# Patient Record
Sex: FEMALE | Race: WHITE | NOT HISPANIC OR LATINO | Employment: UNEMPLOYED | URBAN - METROPOLITAN AREA
[De-identification: names, ages, dates, MRNs, and addresses within clinical notes are randomized per-mention and may not be internally consistent; named-entity substitution may affect disease eponyms.]

---

## 2017-07-20 ENCOUNTER — ALLSCRIPTS OFFICE VISIT (OUTPATIENT)
Dept: OTHER | Facility: OTHER | Age: 57
End: 2017-07-20

## 2017-07-20 DIAGNOSIS — M54.50 LOW BACK PAIN: ICD-10-CM

## 2017-07-31 ENCOUNTER — APPOINTMENT (OUTPATIENT)
Dept: PHYSICAL THERAPY | Facility: CLINIC | Age: 57
End: 2017-07-31
Payer: COMMERCIAL

## 2017-07-31 DIAGNOSIS — M54.50 LOW BACK PAIN: ICD-10-CM

## 2017-07-31 PROCEDURE — 97162 PT EVAL MOD COMPLEX 30 MIN: CPT

## 2017-07-31 PROCEDURE — 97112 NEUROMUSCULAR REEDUCATION: CPT

## 2017-08-01 ENCOUNTER — GENERIC CONVERSION - ENCOUNTER (OUTPATIENT)
Dept: OTHER | Facility: OTHER | Age: 57
End: 2017-08-01

## 2017-08-03 ENCOUNTER — APPOINTMENT (OUTPATIENT)
Dept: PHYSICAL THERAPY | Facility: CLINIC | Age: 57
End: 2017-08-03
Payer: COMMERCIAL

## 2017-08-03 PROCEDURE — 97110 THERAPEUTIC EXERCISES: CPT

## 2017-08-03 PROCEDURE — 97112 NEUROMUSCULAR REEDUCATION: CPT

## 2017-08-07 ENCOUNTER — APPOINTMENT (OUTPATIENT)
Dept: PHYSICAL THERAPY | Facility: CLINIC | Age: 57
End: 2017-08-07
Payer: COMMERCIAL

## 2017-08-07 PROCEDURE — 97110 THERAPEUTIC EXERCISES: CPT

## 2017-08-07 PROCEDURE — 97112 NEUROMUSCULAR REEDUCATION: CPT

## 2017-08-10 ENCOUNTER — APPOINTMENT (OUTPATIENT)
Dept: PHYSICAL THERAPY | Facility: CLINIC | Age: 57
End: 2017-08-10
Payer: COMMERCIAL

## 2017-08-10 PROCEDURE — 97112 NEUROMUSCULAR REEDUCATION: CPT

## 2017-08-10 PROCEDURE — 97110 THERAPEUTIC EXERCISES: CPT

## 2017-08-14 ENCOUNTER — APPOINTMENT (OUTPATIENT)
Dept: PHYSICAL THERAPY | Facility: CLINIC | Age: 57
End: 2017-08-14
Payer: COMMERCIAL

## 2017-08-16 ENCOUNTER — APPOINTMENT (OUTPATIENT)
Dept: PHYSICAL THERAPY | Facility: CLINIC | Age: 57
End: 2017-08-16
Payer: COMMERCIAL

## 2017-08-16 PROCEDURE — 97140 MANUAL THERAPY 1/> REGIONS: CPT

## 2017-08-16 PROCEDURE — 97112 NEUROMUSCULAR REEDUCATION: CPT

## 2017-08-16 PROCEDURE — 97110 THERAPEUTIC EXERCISES: CPT

## 2017-08-17 ENCOUNTER — APPOINTMENT (OUTPATIENT)
Dept: PHYSICAL THERAPY | Facility: CLINIC | Age: 57
End: 2017-08-17
Payer: COMMERCIAL

## 2017-08-17 PROCEDURE — 97112 NEUROMUSCULAR REEDUCATION: CPT

## 2017-08-17 PROCEDURE — 97140 MANUAL THERAPY 1/> REGIONS: CPT

## 2017-08-17 PROCEDURE — 97110 THERAPEUTIC EXERCISES: CPT

## 2017-08-21 ENCOUNTER — APPOINTMENT (OUTPATIENT)
Dept: PHYSICAL THERAPY | Facility: CLINIC | Age: 57
End: 2017-08-21
Payer: COMMERCIAL

## 2017-08-21 PROCEDURE — 97112 NEUROMUSCULAR REEDUCATION: CPT

## 2017-08-21 PROCEDURE — 97110 THERAPEUTIC EXERCISES: CPT

## 2017-08-24 ENCOUNTER — GENERIC CONVERSION - ENCOUNTER (OUTPATIENT)
Dept: OTHER | Facility: OTHER | Age: 57
End: 2017-08-24

## 2017-08-24 ENCOUNTER — APPOINTMENT (OUTPATIENT)
Dept: PHYSICAL THERAPY | Facility: CLINIC | Age: 57
End: 2017-08-24
Payer: COMMERCIAL

## 2017-08-24 PROCEDURE — 97112 NEUROMUSCULAR REEDUCATION: CPT

## 2017-08-24 PROCEDURE — 97110 THERAPEUTIC EXERCISES: CPT

## 2017-08-28 ENCOUNTER — APPOINTMENT (OUTPATIENT)
Dept: PHYSICAL THERAPY | Facility: CLINIC | Age: 57
End: 2017-08-28
Payer: COMMERCIAL

## 2017-08-28 PROCEDURE — 97112 NEUROMUSCULAR REEDUCATION: CPT

## 2017-08-28 PROCEDURE — 97110 THERAPEUTIC EXERCISES: CPT

## 2017-08-31 ENCOUNTER — APPOINTMENT (OUTPATIENT)
Dept: PHYSICAL THERAPY | Facility: CLINIC | Age: 57
End: 2017-08-31
Payer: COMMERCIAL

## 2017-08-31 PROCEDURE — 97112 NEUROMUSCULAR REEDUCATION: CPT

## 2017-08-31 PROCEDURE — 97110 THERAPEUTIC EXERCISES: CPT

## 2017-09-11 ENCOUNTER — APPOINTMENT (OUTPATIENT)
Dept: PHYSICAL THERAPY | Facility: CLINIC | Age: 57
End: 2017-09-11
Payer: COMMERCIAL

## 2017-09-11 PROCEDURE — 97110 THERAPEUTIC EXERCISES: CPT

## 2017-09-11 PROCEDURE — 97112 NEUROMUSCULAR REEDUCATION: CPT

## 2017-09-14 ENCOUNTER — APPOINTMENT (OUTPATIENT)
Dept: PHYSICAL THERAPY | Facility: CLINIC | Age: 57
End: 2017-09-14
Payer: COMMERCIAL

## 2017-09-14 PROCEDURE — 97112 NEUROMUSCULAR REEDUCATION: CPT

## 2017-09-14 PROCEDURE — 97110 THERAPEUTIC EXERCISES: CPT

## 2017-09-14 PROCEDURE — 97140 MANUAL THERAPY 1/> REGIONS: CPT

## 2017-09-18 ENCOUNTER — APPOINTMENT (OUTPATIENT)
Dept: PHYSICAL THERAPY | Facility: CLINIC | Age: 57
End: 2017-09-18
Payer: COMMERCIAL

## 2017-09-18 PROCEDURE — 97110 THERAPEUTIC EXERCISES: CPT

## 2017-09-18 PROCEDURE — 97112 NEUROMUSCULAR REEDUCATION: CPT

## 2017-09-21 ENCOUNTER — GENERIC CONVERSION - ENCOUNTER (OUTPATIENT)
Dept: OTHER | Facility: OTHER | Age: 57
End: 2017-09-21

## 2017-09-21 ENCOUNTER — APPOINTMENT (OUTPATIENT)
Dept: PHYSICAL THERAPY | Facility: CLINIC | Age: 57
End: 2017-09-21
Payer: COMMERCIAL

## 2017-09-21 PROCEDURE — G8992 OTHER PT/OT  D/C STATUS: HCPCS

## 2017-09-21 PROCEDURE — 97110 THERAPEUTIC EXERCISES: CPT

## 2017-09-21 PROCEDURE — 97112 NEUROMUSCULAR REEDUCATION: CPT

## 2017-09-21 PROCEDURE — G8991 OTHER PT/OT GOAL STATUS: HCPCS

## 2017-09-25 ENCOUNTER — APPOINTMENT (OUTPATIENT)
Dept: PHYSICAL THERAPY | Facility: CLINIC | Age: 57
End: 2017-09-25
Payer: COMMERCIAL

## 2017-09-27 ENCOUNTER — GENERIC CONVERSION - ENCOUNTER (OUTPATIENT)
Dept: OTHER | Facility: OTHER | Age: 57
End: 2017-09-27

## 2017-09-27 ENCOUNTER — APPOINTMENT (OUTPATIENT)
Dept: PHYSICAL THERAPY | Facility: CLINIC | Age: 57
End: 2017-09-27
Payer: COMMERCIAL

## 2017-09-28 ENCOUNTER — APPOINTMENT (OUTPATIENT)
Dept: PHYSICAL THERAPY | Facility: CLINIC | Age: 57
End: 2017-09-28
Payer: COMMERCIAL

## 2017-11-30 DIAGNOSIS — Z12.31 ENCOUNTER FOR SCREENING MAMMOGRAM FOR MALIGNANT NEOPLASM OF BREAST: ICD-10-CM

## 2017-12-05 ENCOUNTER — GENERIC CONVERSION - ENCOUNTER (OUTPATIENT)
Dept: FAMILY MEDICINE CLINIC | Facility: CLINIC | Age: 57
End: 2017-12-05

## 2017-12-15 ENCOUNTER — ALLSCRIPTS OFFICE VISIT (OUTPATIENT)
Dept: OTHER | Facility: OTHER | Age: 57
End: 2017-12-15

## 2017-12-16 NOTE — PROGRESS NOTES
Assessment  1  Anxiety and depression (300 00,311) (F41 8)   2  Need for immunization against influenza (V04 81) (Z23)    Plan  Anxiety and depression    · ALPRAZolam 0 25 MG Oral Tablet; take 1 tablet daily prn   · FLUoxetine HCl - 20 MG Oral Tablet; TAKE 1 TABLET DAILY  Need for immunization against influenza    · Fluzone Quadrivalent Intramuscular Suspension; in office now; To Be Done:[id number]    Discussion/Summary    She is interested in restarting Prozac  A prescription for Xanax was given for her to use sparingly  Advised on abuse potential with Xanax, may not take in combination with other certain prescription medications or with alcohol  Also advised to keep this in a safe place where others do not have access to it  She will schedule an appointment for psych  If she wishes for our office to continue to manage her medication, she will need to be seen in 1 month for follow up  She has a CPE scheduled for 2 weeks  total time of encounter was 30 minutes-- and-- 20 minutes was spent counseling  Possible side effects of new medications were reviewed with the patient/guardian today  The treatment plan was reviewed with the patient/guardian  The patient/guardian understands and agrees with the treatment plan      Chief Complaint  pt c/o of stress, anxiety and tightness in chest since Wednesday believes it is stress related  af/fabiana      History of Present Illness  HPI: Here today to discuss issues with anxiety  Years ago she was on medication for questionable bipolar disorder  Her  was verbally abusive and an alcoholic, and once they , she felt really well and did not need medication  She has been struggling with her sons  Her one son is a recovering heroin addict and has been doing well for the past 2 years  Her other son has bipolar disorder and chronic pain issues and is disabled  He was admitted for inpatient care after experiencing hallucinations   She devotes all of her time to taking care of him and it is wearing her thin  HAs reached out for counseling and has had difficulty finding someone to see her soon  She is interested in restarting medication and would also like to start counseling  Did well with Prozac in the past Denies SI/HI      Review of Systems   Constitutional: No fever, no chills, feels well, no tiredness, no recent weight gain or loss  Cardiovascular: chest pain-- and-- palpitations  Respiratory: no shortness of breath  Active Problems  1  Allergic rhinitis (477 9) (J30 9)   2  Body mass index (BMI) of 39 0 to 39 9 in adult (V85 39) (Z68 39)   3  Colon cancer screening (V76 51) (Z12 11)   4  Current tobacco use (305 1) (Z72 0)   5  Encounter for screening mammogram for breast cancer (V76 12) (Z12 31)   6  Lumbago (724 2) (M54 5)    Past Medical History  1  Acute upper respiratory infection (465 9) (J06 9)   2  History of Costochondritis (733 6) (M94 0)  Active Problems And Past Medical History Reviewed: The active problems and past medical history were reviewed and updated today  Family History  Mother    1  Family history of Anxiety and depression   2  Family history of diabetes mellitus (V18 0) (Z83 3)  Son    3  Family history of Anxiety and depression  Sister    4  Family history of Anxiety and depression   5  Family history of breast cancer (V16 3) (Z80 3)    Social History   · Current every day smoker (305 1) (F17 200)   · Current tobacco use (305 1) (Z72 0)  The social history was reviewed and is unchanged  Surgical History  1  History of Total Hysterectomy    Current Meds   1  B Complex Oral Capsule; use as directed; Therapy: [id number] to Recorded   2  Cyclobenzaprine HCl - 10 MG Oral Tablet; TAKE 1 TABLET AT BEDTIME AS NEEDED; Therapy: 95Aft5138 to (Evaluate:10Aug2017)  Requested for: 39Ddu7723; Last Rx:44Jgf2345 Ordered   3  Glucosamine Complex Oral Tablet; Take 1 tablet daily; Therapy: 60DVJ2341 to Recorded   4   Multiple Vitamins Oral Tablet; TAKE 1 TABLET DAILY; Therapy: 55ZOD6448 to Recorded   5  Probiotic Oral Capsule; USE AS DIRECTED; Therapy: 60PGA8867 to Recorded    The medication list was reviewed and updated today  Allergies  1  Sulfa Drugs    Vitals   Recorded: 70ZIB4449 10:48AM   Temperature 97 5 F   Heart Rate 86   Respiration 16   Systolic 670   Diastolic 86   Height 5 ft 4 5 in   Weight 224 lb 6 oz   BMI Calculated 37 92   BSA Calculated 2 07     Physical Exam   Constitutional  General appearance: No acute distress, well appearing and well nourished  Eyes  Conjunctiva and lids: No swelling, erythema or discharge  Ears, Nose, Mouth, and Throat  Otoscopic examination: Tympanic membranes translucent with normal light reflex  Canals patent without erythema  Nasal mucosa, septum, and turbinates: Normal without edema or erythema  Oropharynx: Normal with no erythema, edema, exudate or lesions  Pulmonary  Respiratory effort: No increased work of breathing or signs of respiratory distress  Auscultation of lungs: Clear to auscultation  Cardiovascular  Auscultation of heart: Normal rate and rhythm, normal S1 and S2, without murmurs  Examination of extremities for edema and/or varicosities: Normal    Lymphatic  Palpation of lymph nodes in neck: No lymphadenopathy  Skin  Skin and subcutaneous tissue: Normal without rashes or lesions  Psychiatric  Mood and affect: Normal          Results/Data  PHQ-9 Adult Depression Screening 03Ncl8602 11:28AM User, Ahs     Test Name Result Flag Reference   PHQ-9 Adult Depression Score 17     Over the last two weeks, how often have you been bothered by any of the following problems?  Little interest or pleasure in doing things: Nearly every day - 3 Feeling down, depressed, or hopeless: More than half the days - 2 Trouble falling or staying asleep, or sleeping too much: Nearly every day - 3 Feeling tired or having little energy: Nearly every day - 3 Poor appetite or over eating: More than half the days - 2 Feeling bad about yourself - or that you are a failure or have let yourself or your family down: Nearly every day - 3 Trouble concentrating on things, such as reading the newspaper or watching television: Several days - 1 Moving or speaking so slowly that other people could have noticed  Or the opposite -  being so fidgety or restless that you have been moving around a lot more than usual: Not at all - 0 Thoughts that you would be better off dead, or of hurting yourself in some way: Not at all - 0   PHQ-9 Adult Depression Screening Positive     PHQ-9 Difficulty Level Very difficult     PHQ-9 Severity      Moderately Severe Depression       Attending Note  Collaborating Physician Note: Collaborating Note: I agree with the Advanced Practitioner note        Future Appointments    Date/Time Provider Specialty Site   12/28/2017 01:15 PM Taylor John DO Family Medicine [facility]     Signatures   Electronically signed by : Wilfredo Kaufman; Dec 15 2017 11:27AM EST                       (Author)    Electronically signed by : Governor Perico DO; Dec 15 2017 11:30AM EST                       (Review)

## 2017-12-21 RX ORDER — DIPHENOXYLATE HYDROCHLORIDE AND ATROPINE SULFATE 2.5; .025 MG/1; MG/1
1 TABLET ORAL EVERY MORNING
COMMUNITY
End: 2020-01-13 | Stop reason: ALTCHOICE

## 2017-12-21 RX ORDER — FLUOXETINE 10 MG/1
10 CAPSULE ORAL EVERY MORNING
COMMUNITY
End: 2018-01-29 | Stop reason: SDUPTHER

## 2017-12-21 NOTE — PRE-PROCEDURE INSTRUCTIONS
Pre-Surgery Instructions:   Medication Instructions    ALPRAZolam (XANAX PO) Patient was instructed by Physician and understands   BIOTIN PO Patient was instructed by Physician and understands   Calcium Citrate-Vitamin D (CALCIUM CITRATE + D PO) Patient was instructed by Physician and understands   EVENING PRIMROSE OIL PO Patient was instructed by Physician and understands   FLUoxetine (PROzac) 10 mg capsule Patient was instructed by Physician and understands   Ginkgo Biloba (GINKOBA PO) Patient was instructed by Physician and understands   GLUTATHIONE PO Patient was instructed by Physician and understands   Misc Natural Products (GLUCOSAMINE CHONDROITIN TRIPLE PO) Patient was instructed by Physician and understands   multivitamin [facility]) TABS Patient was instructed by Physician and understands   NON FORMULARY Patient was instructed by Physician and understands   NON FORMULARY Patient was instructed by Physician and understands  Pt to follow Dr Tanner Jovel instructions    sister Dorian Goodness

## 2017-12-27 ENCOUNTER — ANESTHESIA (OUTPATIENT)
Dept: GASTROENTEROLOGY | Facility: AMBULARY SURGERY CENTER | Age: 57
End: 2017-12-27
Payer: COMMERCIAL

## 2017-12-27 ENCOUNTER — HOSPITAL ENCOUNTER (OUTPATIENT)
Facility: AMBULARY SURGERY CENTER | Age: 57
Setting detail: OUTPATIENT SURGERY
Discharge: HOME | End: 2017-12-27
Attending: INTERNAL MEDICINE | Admitting: INTERNAL MEDICINE
Payer: COMMERCIAL

## 2017-12-27 ENCOUNTER — GENERIC CONVERSION - ENCOUNTER (OUTPATIENT)
Dept: GASTROENTEROLOGY | Facility: CLINIC | Age: 57
End: 2017-12-27

## 2017-12-27 VITALS
HEIGHT: 64 IN | RESPIRATION RATE: 18 BRPM | BODY MASS INDEX: 37.73 KG/M2 | HEART RATE: 59 BPM | TEMPERATURE: 97.2 F | SYSTOLIC BLOOD PRESSURE: 113 MMHG | WEIGHT: 221 LBS | OXYGEN SATURATION: 98 % | DIASTOLIC BLOOD PRESSURE: 77 MMHG

## 2017-12-27 DIAGNOSIS — Z12.11 ENCOUNTER FOR SCREENING FOR MALIGNANT NEOPLASM OF COLON: ICD-10-CM

## 2017-12-27 PROCEDURE — 88305 TISSUE EXAM BY PATHOLOGIST: CPT | Performed by: INTERNAL MEDICINE

## 2017-12-27 PROCEDURE — 88344 IMHCHEM/IMCYTCHM EA MLT ANTB: CPT | Performed by: INTERNAL MEDICINE

## 2017-12-27 RX ORDER — PROPOFOL 10 MG/ML
INJECTION, EMULSION INTRAVENOUS AS NEEDED
Status: DISCONTINUED | OUTPATIENT
Start: 2017-12-27 | End: 2017-12-27 | Stop reason: SURG

## 2017-12-27 RX ORDER — SODIUM CHLORIDE 9 MG/ML
75 INJECTION, SOLUTION INTRAVENOUS CONTINUOUS
Status: DISCONTINUED | OUTPATIENT
Start: 2017-12-27 | End: 2017-12-27 | Stop reason: HOSPADM

## 2017-12-27 RX ORDER — PROPOFOL 10 MG/ML
INJECTION, EMULSION INTRAVENOUS CONTINUOUS PRN
Status: DISCONTINUED | OUTPATIENT
Start: 2017-12-27 | End: 2017-12-27 | Stop reason: SURG

## 2017-12-27 RX ADMIN — SODIUM CHLORIDE: 0.9 INJECTION, SOLUTION INTRAVENOUS at 13:41

## 2017-12-27 RX ADMIN — PROPOFOL 100 MG: 10 INJECTION, EMULSION INTRAVENOUS at 13:45

## 2017-12-27 RX ADMIN — PROPOFOL 120 MCG/KG/MIN: 10 INJECTION, EMULSION INTRAVENOUS at 13:48

## 2017-12-27 NOTE — H&P
History and Physical - [facility]  Aravindtraciedg Desean 56 y o  female MRN: [id number]        HPI:  57-year-old female with history of colon polyps was referred for colonoscopy  Her last colonoscopy was 5 years ago  Patient has regular bowel movements and denies any blood or mucus in the stool  Appetite is good and denies any recent weight loss  Denies any abdominal pain, nausea, or vomiting  Has no heartburn or acid reflux  Denies any difficulty swallowing  Historical Information   Past Medical History:   Diagnosis Date    Arthritis     DDD    Stress     Wears glasses      Past Surgical History:   Procedure Laterality Date    BILATERAL OOPHORECTOMY      COLONOSCOPY      HYSTERECTOMY      partial    LIPOSUCTION      lipoma    TONSILLECTOMY      WISDOM TOOTH EXTRACTION      x4     Social History   History   Alcohol Use    Yes     Comment: rarely     History   Drug Use No     History   Smoking Status    Current Every Day Smoker    Packs/day: 0 40    Years: 10 00   Smokeless Tobacco    Never Used     Family History   Problem Relation Age of Onset    Other Mother      damage to diaphragm s/p surgery    Rheum arthritis Mother        Meds/Allergies     Prescriptions Prior to Admission   Medication    ALPRAZolam (XANAX PO)    BIOTIN PO    Calcium Citrate-Vitamin D (CALCIUM CITRATE + D PO)    EVENING PRIMROSE OIL PO    FLUoxetine (PROzac) 10 mg capsule    Ginkgo Biloba (GINKOBA PO)    GLUTATHIONE PO    Misc Natural Products (GLUCOSAMINE CHONDROITIN TRIPLE PO)    multivitamin (THERAGRAN) TABS    NON FORMULARY    NON FORMULARY       Allergies   Allergen Reactions    Sulfa Antibiotics Rash       Objective     Blood pressure 127/74, pulse 59, temperature (!) 97 2 °F (36 2 °C), temperature source Tympanic, resp  rate 18, height 5' 4" (1 626 m), weight 100 kg (221 lb), SpO2 96 %      PHYSICAL EXAM:    Gen: NAD  CV: S1 & S2 normal, RRR  CHEST: Clear to auscultate  ABD: soft, NT/ND, good bowel sounds  EXT: no edema    ASSESSMENT:     History of colon polyps    PLAN:    Colonoscopy

## 2017-12-27 NOTE — ANESTHESIA PREPROCEDURE EVALUATION
Review of Systems/Medical History  Patient summary reviewed  Chart reviewed      Cardiovascular   Pulmonary  Smoker 5-10 packs per year , Tobacco cessation counseling given, ,        GI/Hepatic            Endo/Other  Arthritis     GYN       Hematology   Musculoskeletal       Neurology   Psychology           Physical Exam    Airway    Mallampati score: III  TM Distance: >3 FB  Neck ROM: full     Dental       Cardiovascular  Rhythm: regular, Rate: normal,     Pulmonary  Breath sounds clear to auscultation,     Other Findings        Anesthesia Plan  ASA Score- 2     Anesthesia Type- IV sedation with anesthesia with ASA Monitors  Additional Monitors:   Airway Plan:         Plan Factors- Patient instructed to abstain from smoking on day of procedure  Patient smoked on day of surgery  Induction- intravenous  Postoperative Plan-     Informed Consent- Anesthetic plan and risks discussed with patient

## 2017-12-27 NOTE — OP NOTE
COLONOSCOPY    PROCEDURE: Colonoscopy/ Biopsy    INDICATIONS: History of Colon Polyps    POST-OP DIAGNOSIS: See the impression below    SEDATION: Monitored anesthesia care, check anesthesia records    PHYSICAL EXAM:    /74   Pulse 59   Temp (!) 97 2 °F (36 2 °C) (Tympanic)   Resp 18   Ht 5' 4" (1 626 m)   Wt 100 kg (221 lb)   SpO2 96%   BMI 37 93 kg/m²   Body mass index is 37 93 kg/m²  General: NAD  Heart: S1 & S2 normal, RRR  Lungs: CTA, No rales or rhonchi  Abdomen: Soft, nontender, nondistended, good bowel sounds    CONSENT:  Informed consent was obtained for the procedure, including sedation after explaining the risks and benefits of the procedure  Risks including but not limited to bleeding, perforation, infection, aspiration were discussed in detail  Also explained about less than 100%$ sensitivity with the exam and other alternatives  PREPARATION:   EKG tracing, pulse oximetry, blood pressure were monitored throughout the procedure  Patient was identified by myself both verbally and by visual inspection of ID band  DESCRIPTION:   Patient was placed in the left lateral decubitus position and was sedated with the above medication  Digital rectal examination was performed  The colonoscope was introduced in to the anal canal and advanced up to cecum, which was identified by the appendiceal orifice and IC valve  A careful inspection was made as the colonoscope was withdrawn, including a retroflexed view of the rectum; findings and interventions are described below  Appropriate photodocumentation was obtained  The quality of the colonic preparation was adequate  FINDINGS:    1  Cecum and IC valve- Normal    2  Normal colon mucosa    3  At the dentate line and the distal rectum noted cluster of polypoid lesions that appeared to be condyloma and were removed with the biopsy forceps           IMPRESSIONS:      As above    RECOMMENDATIONS:    Check pathology and consider referral to Colorectal surgery for fulgeration    COMPLICATIONS:  None; patient tolerated the procedure well      DISPOSITION: PACU           CONDITION: Stable

## 2017-12-28 ENCOUNTER — ALLSCRIPTS OFFICE VISIT (OUTPATIENT)
Dept: OTHER | Facility: OTHER | Age: 57
End: 2017-12-28

## 2017-12-30 ENCOUNTER — GENERIC CONVERSION - ENCOUNTER (OUTPATIENT)
Dept: OTHER | Facility: OTHER | Age: 57
End: 2017-12-30

## 2017-12-30 LAB
A/G RATIO (HISTORICAL): 1.7 (ref 1.2–2.2)
ALBUMIN SERPL BCP-MCNC: 4.2 G/DL (ref 3.5–5.5)
ALP SERPL-CCNC: 55 IU/L (ref 39–117)
ALT SERPL W P-5'-P-CCNC: 14 IU/L (ref 0–32)
AST SERPL W P-5'-P-CCNC: 14 IU/L (ref 0–40)
BILIRUB SERPL-MCNC: 0.4 MG/DL (ref 0–1.2)
BUN SERPL-MCNC: 13 MG/DL (ref 6–24)
BUN/CREA RATIO (HISTORICAL): 16 (ref 9–23)
CALCIUM SERPL-MCNC: 9.2 MG/DL (ref 8.7–10.2)
CHLORIDE SERPL-SCNC: 102 MMOL/L (ref 96–106)
CO2 SERPL-SCNC: 25 MMOL/L (ref 18–29)
CREAT SERPL-MCNC: 0.79 MG/DL (ref 0.57–1)
EGFR AFRICAN AMERICAN (HISTORICAL): 97 ML/MIN/1.73
EGFR-AMERICAN CALC (HISTORICAL): 85 ML/MIN/1.73
GLUCOSE SERPL-MCNC: 103 MG/DL (ref 65–99)
POTASSIUM SERPL-SCNC: 4.9 MMOL/L (ref 3.5–5.2)
SODIUM SERPL-SCNC: 142 MMOL/L (ref 134–144)
TOT. GLOBULIN, SERUM (HISTORICAL): 2.5 G/DL (ref 1.5–4.5)
TOTAL PROTEIN (HISTORICAL): 6.7 G/DL (ref 6–8.5)

## 2017-12-31 LAB
CHOLEST SERPL-MCNC: 173 MG/DL (ref 100–199)
CHOLEST/HDLC SERPL: 3.4 RATIO UNITS (ref 0–4.4)
HDLC SERPL-MCNC: 51 MG/DL
INTERPRETATION (HISTORICAL): NORMAL
LDLC SERPL CALC-MCNC: 91 MG/DL (ref 0–99)
TRIGL SERPL-MCNC: 155 MG/DL (ref 0–149)
TSH SERPL DL<=0.05 MIU/L-ACNC: 1.98 UIU/ML (ref 0.45–4.5)
VLDLC SERPL CALC-MCNC: 31 MG/DL (ref 5–40)

## 2018-01-02 ENCOUNTER — GENERIC CONVERSION - ENCOUNTER (OUTPATIENT)
Dept: OTHER | Facility: OTHER | Age: 58
End: 2018-01-02

## 2018-01-03 ENCOUNTER — GENERIC CONVERSION - ENCOUNTER (OUTPATIENT)
Dept: OTHER | Facility: OTHER | Age: 58
End: 2018-01-03

## 2018-01-12 ENCOUNTER — GENERIC CONVERSION - ENCOUNTER (OUTPATIENT)
Dept: GASTROENTEROLOGY | Facility: CLINIC | Age: 58
End: 2018-01-12

## 2018-01-12 VITALS
DIASTOLIC BLOOD PRESSURE: 72 MMHG | HEIGHT: 65 IN | BODY MASS INDEX: 38.32 KG/M2 | HEART RATE: 84 BPM | SYSTOLIC BLOOD PRESSURE: 116 MMHG | WEIGHT: 230 LBS | TEMPERATURE: 97.2 F | RESPIRATION RATE: 16 BRPM

## 2018-01-22 VITALS
RESPIRATION RATE: 20 BRPM | BODY MASS INDEX: 37.15 KG/M2 | HEART RATE: 76 BPM | WEIGHT: 223 LBS | HEIGHT: 65 IN | TEMPERATURE: 97.5 F | DIASTOLIC BLOOD PRESSURE: 72 MMHG | SYSTOLIC BLOOD PRESSURE: 112 MMHG

## 2018-01-23 VITALS
BODY MASS INDEX: 37.38 KG/M2 | SYSTOLIC BLOOD PRESSURE: 138 MMHG | HEIGHT: 65 IN | HEART RATE: 86 BPM | DIASTOLIC BLOOD PRESSURE: 86 MMHG | TEMPERATURE: 97.5 F | RESPIRATION RATE: 16 BRPM | WEIGHT: 224.38 LBS

## 2018-01-23 NOTE — RESULT NOTES
Discussion/Summary    Spoke to patient about the biopsy results showing condylomas with low-grade squamous dysplasia  Refer her to Dr Frederick Brooks  (fax reports)    DISCUSSED WITH PATIENT- WILL FAX REPORT TO DR Christian Arroyo MAILED REFERRAL TO PT AS WELL  CS         Verified Results  (1) TISSUE EXAM [id number] 02:04PM Coy Turpin     Test Name Result Flag Reference   LAB AP CASE REPORT (Report)     Surgical Pathology Report             Case: [id number]                   Authorizing Provider: Linette Amaya MD     Collected:      12/27/2017 1404        [facility]  Received:      12/27/2017 [facility]                                     Pathologist:      Christofer Gonzales MD                                 Specimen:  Rectum, distal rectum lesions   LAB AP FINAL DIAGNOSIS (Report)     A  Distal rectal lesions (biopsy):    - Portions of condylomas  - At least low-grade squamous dysplasia; focal high-grade dysplasia   cannot be excluded  Electronically signed by Christofer Gonzales MD on 1/2/2018 at 11:33 AM  Preliminary result electronically signed by Christofer Gonzales MD on 12/29/2017 at 11:35 AM   LAB AP MICROSCOPIC DESCRIPTION      - Immunohistochemical studies (with appropriate controls) demonstrate:    - P16 with patchy / focally strong expression and elevated Ki-67   This is an appended report  These results have been appended to a previously preliminary verified report  LAB AP NOTE      Interpretation performed at [facility], [address] [place] [address]  LAB AP SURGICAL ADDITIONAL INFORMATION (Report)     All controls performed with the immunohistochemical stains reported above   reacted appropriately  These tests were developed and their performance   characteristics determined by [address] [place] [address] or   [facility]  They may not be cleared or approved by the [place]  Food and Drug Administration   The FDA has determined that such clearance   or approval is not necessary  These tests are used for clinical purposes  They should not be regarded as investigational or for research  This   laboratory has been approved by [place] [address], designated as a high-complexity   laboratory and is qualified to perform these tests  LAB AP GROSS DESCRIPTION (Report)     A  The specimen is received in formalin, labeled with the patient's name   and hospital number, and is designated distal rectum lesions?   The   specimen consists multiple gray-white, focally hyperemic, irregular soft   tissue fragments measuring 0 1-0 2 cm in greatest dimension, and   approximately 0 2 cc in aggregate  Entirely submitted  One cassette  Note: The estimated total formalin fixation time based upon information   provided by the submitting clinician and the standard processing schedule   is under 72 hours      NHubel   LAB AP CLINICAL INFORMATION      Encounter for screening for malignant neoplasm of colon       Plan  Anal condylomata    · 2 - Dennie Gaster MD, [facility]  Colorectal Surgery, Gastroenterology Co-Management  *   Status: Hold For - Scheduling  Requested for: [id number]  () Care Summary provided  : Yes

## 2018-01-23 NOTE — PROGRESS NOTES
Assessment    1  Encounter for preventive health examination (V70 0) (Z00 00)   2  Current tobacco use (305 1) (Z72 0)   3  Anxiety and depression (300 00,311) (F41 8)   4  BMI 37 0-37 9, adult (V85 37) (Z68 37)   5  Obesity, Class II, BMI 35-39 9, with comorbidity (278 00) (E66 9)   6  Encounter for screening for cardiovascular disorders (V81 2) (Z13 6)    Plan  Anxiety and depression    · ALPRAZolam 0 25 MG Oral Tablet; take 1 tablet daily prn   · FLUoxetine HCl - 20 MG Oral Tablet; TAKE 1 TABLET DAILY  Anxiety and depression, Encounter for screening for cardiovascular disorders    · (1) COMPREHENSIVE METABOLIC PANEL; Status:Active; Requested for:35Mio2798;    · (1) LIPID PANEL, FASTING; Status:Active; Requested for:31Frz2014;    · (1) TSH; Status:Active; Requested for:92Hmz5084;   Obesity, Class II, BMI 35-39 9, with comorbidity    · Avoid alcoholic beverages ; Status:Complete;   Done: 65FHU5820   · Eat a low fat and low cholesterol diet ; Status:Complete;   Done: 84FQU0339   · Keep a diary of when and what you eat ; Status:Complete;   Done: 72RGX9227   · Some eating tips that can help you lose weight ; Status:Complete;   Done: 84RAY7543   · Stretch and warm up your muscles during the first 10 minutes , then cool down your  muscles for the last 10 minutes of exercise ; Status:Complete;   Done: 69VQN9252   · We encourage all of our patients to exercise regularly  30 minutes of exercise or physical  activity five or more days a week is recommended for children and adults ;  Status:Complete;   Done: 32CNT6287   · We recommend that you bring your body mass index down to 26 ; Status:Complete;    Done: 22JCV1848   · We recommend that you change your eating habits slowly ; Status:Complete;   Done:  50BIS7566   · We recommend you modify your diet to achieve and maintain a healthy weight  Being  overweight may increase your risk for developing health problems such as diabetes,  heart disease, and cancer   Avoid high fat foods and eat a balanced diet rich  in fruits and vegetables  The combination of a reduced-calorie diet and increased  physical activity is recommended  Please let us know if you would like to  learn more about your nutrition and calorie needs, and additional options including  weight loss programs that can help you achieve your goals ; Status:Complete;   Done:  17XQZ8372   · We want you to follow the Therapeutic Lifestyle Changes (TLC) diet ; Status:Complete;    Done: 95MTG2775   · Call [phone] if: You are having difficulty sleeping (insomnia) ; Status:Complete;    Done: 81BQD8919   · Call [phone] if: You are urinating too frequently ; Status:Complete;   Done:  29EMC3237   · Call [phone] if: You feel thirsty most of the time ; Status:Complete;   Done:  54RMB3232   · Call [phone] if: You feel your heart is beating very fast or skipping beats ;  Status:Complete;   Done: 07ZAC4892   · Call [phone] if: You have feelings of extreme sadness and feelings of  hopelessness ; Status:Complete;   Done: 24DUB5191   · Call [phone] if: You have pain in your abdomen ; Status:Complete;   Done:  03SOC8144   · Call [phone] if: You have symptoms of sleep apnea ; Status:Complete;   Done:  26UNT5195   · Call 911 if: You have sudden or severe chest pain with shortness of breath, rapid  breathing, or cough ; Status:Complete;   Done: 46WFU8636  SocHx: Current tobacco use    · Begin a limited exercise program ; Status:Complete;   Done: 33OCS6318   · Begin or continue regular aerobic exercise   Gradually work up to at least 3 sessions of 30  minutes of exercise a week ; Status:Complete;   Done: 28VPK6779   · Decreasing the stress in your life may help your condition improve ; Status:Complete;    Done: 13BEJ8357   · Limit your use of alcohol to 2 drinks or cans of beer a day ; Status:Complete;   Done:  77RMM0318   · Regular aerobic exercise can help reduce stress ; Status:Complete;   Done: 05RVL9429   · Shared Decision Making Aid given; Status:Complete;   Done: 85TQL8497   · There are many exercise options for seniors ; Status:Complete;   Done: 40OIL3710   · You need to quit smoking ; Status:Complete;   Done: 97RIU1465   · Call [phone] if: The symptoms seem worse ; Status:Complete;   Done:  98RTP3542   · Call [phone] if: You are having trouble following our instructions or treatment for  any reason ; Status:Complete;   Done: 30LLI5607   · Call [phone] if: You have symptoms of anxiety ; Status:Complete;   Done:  56EVN0974   · Call [phone] if: Your depression is worse ; Status:Complete;   Done: 48VLS7799   · Call 911 if: You are considering suicide ; Status:Complete;   Done: 38JOL7430   · Call 911 if: You are thinking about harming yourself or someone else ; Status:Complete;    Done: 33HSL2533   · Call 911 if: You experience a new kind of chest pain (angina) or pressure ;  Status:Complete;   Done: 12ETS8652   · Call 911 if: You have any symptoms of a stroke ; Status:Complete;   Done: 75RTS4866    Discussion/Summary    Pt is going to call the obgyn that did the total hysterectomy to see if she requires paps of the cuff, sounds like everything was taken out though  Chief Complaint  Seen for a CPE  er/cma  History of Present Illness  HM, Adult Female: The patient is being seen for a health maintenance evaluation  General Health:   Screening:   HPI: pt is here for a full physical    pt states she tried chantix and states it gave her stomach upset and did not decrease hebaseline need to smoke  She will start patches - she is not using them bnow  pt states she is having issues with her adult son  pt was strated on prozac and xanax    pt had her colon yesterday      Review of Systems    Constitutional: No fever, no chills, feels well, no tiredness, no recent weight gain or weight loss     Eyes: No complaints of eye pain, no red eyes, no eyesight problems, no discharge, no dry eyes, no itching of eyes  ENT: no complaints of earache, no loss of hearing, no nose bleeds, no nasal discharge, no sore throat, no hoarseness  Cardiovascular: No complaints of slow heart rate, no fast heart rate, no chest pain, no palpitations, no leg claudication, no lower extremity edema  Respiratory: No complaints of shortness of breath, no wheezing, no cough, no SOB on exertion, no orthopnea, no PND  Gastrointestinal: No complaints of abdominal pain, no constipation, no nausea or vomiting, no diarrhea, no bloody stools  Genitourinary: No complaints of dysuria, no incontinence, no pelvic pain, no dysmenorrhea, no vaginal discharge or bleeding  Musculoskeletal: No complaints of arthralgias, no myalgias, no joint swelling or stiffness, no limb pain or swelling  Integumentary: No complaints of skin rash or lesions, no itching, no skin wounds, no breast pain or lump  Neurological: No complaints of headache, no confusion, no convulsions, no numbness, no dizziness or fainting, no tingling, no limb weakness, no difficulty walking  Psychiatric: Not suicidal, no sleep disturbance, no anxiety or depression, no change in personality, no emotional problems  Endocrine: No complaints of proptosis, no hot flashes, no muscle weakness, no deepening of the voice, no feelings of weakness  Hematologic/Lymphatic: No complaints of swollen glands, no swollen glands in the neck, does not bleed easily, does not bruise easily  Active Problems    1  Anxiety and depression (300 00,311) (F41 8)   2  Body mass index (BMI) of 39 0 to 39 9 in adult (V85 39) (Z68 39)   3  Colon cancer screening (V76 51) (Z12 11)   4  Current tobacco use (305 1) (Z72 0)   5  Encounter for screening mammogram for breast cancer (V76 12) (Z12 31)   6   Need for immunization against influenza (V04 81) (Z23)    Past Medical History    · Acute upper respiratory infection (465 9) (J06 9)   · History of Costochondritis (733 6) (M94 0)   · History of allergic rhinitis (V12 69) (Z87 09)   · History of low back pain (V13 59) (Z87 39)    Surgical History    · History of Total Hysterectomy    Family History  Mother    · Family history of Anxiety and depression   · Family history of diabetes mellitus (V18 0) (Z83 3)  Son    · Family history of Anxiety and depression  Sister    · Family history of Anxiety and depression   · Family history of breast cancer (V16 3) (Z80 3)    Social History    · Current every day smoker (305 1) (F17 200)   · Current tobacco use (305 1) (Z72 0)    Current Meds   1  ALPRAZolam 0 25 MG Oral Tablet; take 1 tablet daily prn; Therapy: 39DAR0317 to (Evaluate:25Ezp9173); Last Rx:35Zuo5851 Ordered   2  B Complex Oral Capsule; take 1 capsule daily; Therapy: 80HDP4715 to Recorded   3  FLUoxetine HCl - 20 MG Oral Tablet; TAKE 1 TABLET DAILY; Therapy: 71STY0468 to (Evaluate:42Qyk1318)  Requested for: 20WJQ5511; Last   Rx:67Noa0679 Ordered   4  Glucosamine Complex Oral Tablet; Take 1 tablet daily; Therapy: 01JIY1506 to Recorded   5  Multiple Vitamins Oral Tablet; TAKE 1 TABLET DAILY; Therapy: 66EOW0958 to Recorded   6  Probiotic Oral Capsule; take 1 capsule daily; Therapy: 78LTB9561 to Recorded    Allergies    1  Sulfa Drugs    Vitals   Recorded: 39Rxt4920 01:04PM   Temperature 97 5 F   Heart Rate 76   Respiration 20   Systolic 515   Diastolic 72   Height 5 ft 4 5 in   Weight 223 lb    BMI Calculated 37 69   BSA Calculated 2 06     Physical Exam    Constitutional   General appearance: Abnormal   obese  Head and Face   Head and face: Normal     Palpation of the face and sinuses: No sinus tenderness  Eyes   Conjunctiva and lids: No swelling, erythema or discharge  Pupils and irises: Equal, round, reactive to light  Ophthalmoscopic examination: Normal fundi and optic discs      Ears, Nose, Mouth, and Throat   External inspection of ears and nose: Normal     Otoscopic examination: Tympanic membranes translucent with normal light reflex  Canals patent without erythema  Hearing: Normal     Neck   Neck: Supple, symmetric, trachea midline, no masses  Thyroid: Normal, no thyromegaly  Pulmonary   Respiratory effort: No increased work of breathing or signs of respiratory distress  Auscultation of lungs: Clear to auscultation  Cardiovascular   Palpation of heart: Normal PMI, no thrills  Abdomen   Abdomen: Non-tender, no masses  Liver and spleen: No hepatomegaly or splenomegaly  Lymphatic   Palpation of lymph nodes in neck: No lymphadenopathy  Palpation of lymph nodes in axillae: No lymphadenopathy  Musculoskeletal   Gait and station: Normal        Results/Data  PHQ-9 Adult Depression Screening 42Oeb6348 01:24PM Savannah Booth     Test Name Result Flag Reference   PHQ-9 Adult Depression Score 10     Over the last two weeks, how often have you been bothered by any of the following problems? Little interest or pleasure in doing things: More than half the days - 2  Feeling down, depressed, or hopeless: Several days - 1  Trouble falling or staying asleep, or sleeping too much: Not at all - 0  Feeling tired or having little energy: Nearly every day - 3  Poor appetite or over eating: Not at all - 0  Feeling bad about yourself - or that you are a failure or have let yourself or your family down: Several days - 1  Trouble concentrating on things, such as reading the newspaper or watching television: Nearly every day - 3  Moving or speaking so slowly that other people could have noticed  Or the opposite -  being so fidgety or restless that you have been moving around a lot more than usual: Not at all - 0  Thoughts that you would be better off dead, or of hurting yourself in some way: Not at all - 0   PHQ-9 Adult Depression Screening Negative     PHQ-9 Difficulty Level Not difficult at all     PHQ-9 Severity Moderate Depression         Procedure    Procedure: Visual Acuity Test    Indication: routine screening  Inforrmation supplied by a Snellen chart  Results: 20/15 in both eyes with corrective device, 20/20 in the right eye with corrective device, 20/20 in the left eye with corrective device      Health Management  Encounter for screening mammogram for breast cancer   MAMMO DIAGNOSTIC BILATERAL W 3D & CAD; every 1 year; Next Due: 74IOU1827;   Active    Signatures   Electronically signed by : Dinesh Brand DO; Dec 28 2017  1:38PM EST                       (Author)

## 2018-01-23 NOTE — RESULT NOTES
Discussion/Summary   low fat low cholesterol diet     Verified Results  (1) COMPREHENSIVE METABOLIC PANEL [id number] 00:19CP Globial Number     Test Name Result Flag Reference   Glucose, Serum 103 mg/dL H 65-99   BUN 13 mg/dL  6-24   Creatinine, Serum 0 79 mg/dL  0 57-1 00   BUN/Creatinine Ratio 16  9-23   Sodium, Serum 142 mmol/L  134-144   Potassium, Serum 4 9 mmol/L  3 5-5 2   Chloride, Serum 102 mmol/L     Carbon Dioxide, Total 25 mmol/L  18-29   Calcium, Serum 9 2 mg/dL  8 7-10 2   Protein, Total, Serum 6 7 g/dL  6 0-8 5   Albumin, Serum 4 2 g/dL  3 5-5 5   Globulin, Total 2 5 g/dL  1 5-4 5   A/G Ratio 1 7  1 2-2 2   Bilirubin, Total 0 4 mg/dL  0 0-1 2   Alkaline Phosphatase, S 55 IU/L     AST (SGOT) 14 IU/L  0-40   ALT (SGPT) 14 IU/L  0-32   eGFR If NonAfricn Am 85 mL/min/1 73  >59   eGFR If Africn Am 97 mL/min/1 73  >59     (1) LIPID PANEL, FASTING [id number] 10:17AM Globial Number     Test Name Result Flag Reference   Cholesterol, Total 173 mg/dL  100-199   Triglycerides 155 mg/dL H 0-149   HDL Cholesterol 51 mg/dL  >39   VLDL Cholesterol Kavon 31 mg/dL  5-40   LDL Cholesterol Calc 91 mg/dL  0-99   T  Chol/HDL Ratio 3 4 ratio units  0 0-4 4   T  Chol/HDL Ratio                                                             Men  Women                                               1/2 Avg  Risk  3 4    3 3                                                   Avg Risk  5 0    4 4                                                2X Avg  Risk  9 6    7 1                                                3X Avg  Risk 23 4   11 0     (1) TSH [id number] 10:17AM Globial Number     Test Name Result Flag Reference   TSH 1 980 uIU/mL  0 450-4 500     [facility]) Cardiovascular Risk Assessment [id number] 10:17AM Globial Number     Test Name Result Flag Reference   Interpretation Note     Supplemental report is available  PDF Image

## 2018-01-29 DIAGNOSIS — F41.9 ANXIETY AND DEPRESSION: Primary | ICD-10-CM

## 2018-01-29 DIAGNOSIS — F32.A ANXIETY AND DEPRESSION: Primary | ICD-10-CM

## 2018-01-29 PROBLEM — A63.0 ANAL CONDYLOMATA: Status: ACTIVE | Noted: 2018-01-03

## 2018-01-29 PROBLEM — M54.50 LOW BACK PAIN: Status: ACTIVE | Noted: 2017-07-20

## 2018-01-29 RX ORDER — FLUOXETINE 10 MG/1
20 CAPSULE ORAL EVERY MORNING
Qty: 90 CAPSULE | Refills: 0 | Status: SHIPPED | OUTPATIENT
Start: 2018-01-29 | End: 2018-01-29 | Stop reason: SDUPTHER

## 2018-01-29 RX ORDER — FLUOXETINE HYDROCHLORIDE 20 MG/1
20 CAPSULE ORAL DAILY
Qty: 90 CAPSULE | Refills: 0 | Status: SHIPPED | OUTPATIENT
Start: 2018-01-29 | End: 2019-04-01

## 2018-01-29 RX ORDER — VARENICLINE TARTRATE 1 MG/1
TABLET, FILM COATED ORAL
COMMUNITY
Start: 2017-07-20 | End: 2018-04-06

## 2018-01-29 RX ORDER — ALPRAZOLAM 0.25 MG/1
1 TABLET ORAL DAILY PRN
COMMUNITY
Start: 2017-12-15 | End: 2018-04-06

## 2018-01-29 RX ORDER — FLUOXETINE 20 MG/1
1 TABLET, FILM COATED ORAL DAILY
COMMUNITY
Start: 2017-12-15 | End: 2018-01-29 | Stop reason: SDUPTHER

## 2018-03-26 ENCOUNTER — OFFICE VISIT (OUTPATIENT)
Dept: OBGYN CLINIC | Facility: CLINIC | Age: 58
End: 2018-03-26
Payer: COMMERCIAL

## 2018-03-26 VITALS
BODY MASS INDEX: 33.76 KG/M2 | SYSTOLIC BLOOD PRESSURE: 118 MMHG | WEIGHT: 202.6 LBS | DIASTOLIC BLOOD PRESSURE: 64 MMHG | HEIGHT: 65 IN

## 2018-03-26 DIAGNOSIS — Z01.419 WOMEN'S ANNUAL ROUTINE GYNECOLOGICAL EXAMINATION: ICD-10-CM

## 2018-03-26 DIAGNOSIS — Z12.39 SCREENING FOR MALIGNANT NEOPLASM OF BREAST: Primary | ICD-10-CM

## 2018-03-26 PROBLEM — Z86.001 H/O CARCINOMA IN SITU OF CERVIX UTERI: Status: ACTIVE | Noted: 2018-03-26

## 2018-03-26 PROCEDURE — 87624 HPV HI-RISK TYP POOLED RSLT: CPT | Performed by: OBSTETRICS & GYNECOLOGY

## 2018-03-26 PROCEDURE — S0610 ANNUAL GYNECOLOGICAL EXAMINA: HCPCS | Performed by: OBSTETRICS & GYNECOLOGY

## 2018-03-26 PROCEDURE — G0145 SCR C/V CYTO,THINLAYER,RESCR: HCPCS | Performed by: OBSTETRICS & GYNECOLOGY

## 2018-03-26 NOTE — PROGRESS NOTES
ASSESSMENT & PLAN: Tyrese Holland is a 56 y o  No obstetric history on file  with normal gynecologic exam     1   Routine well woman exam done today  2    Pap and HPV:Pap with HPV was done today  Current ASCCP Guidelines reviewed  She has a history of cervical cancer in situ  We will get the op report and path report from the surgery center done in 2009 at [facility]  3  Mammogram ordered  Recommend yearly mammography  4   Colonoscopy recently done, following with Dr OWENSELSY [facility]   5  The patient is sexually active  6  The following were reviewed in today's visit: breast self exam, exercise and healthy diet  7  Patient to return to office in 12 months for annual exam, sooner as indicated based on test results  All questions have been answered to her satisfaction  CC:  Annual Gynecologic Examination    HPI: Tyrese Holland is a 56 y o  No obstetric history on file  who presents for annual gynecologic examination  She has the following concerns:  Seeing Eyvazzadeh due to HPV in colon found on colonoscopy  She is s/p hysterectomy in 2009 for fibroids, painful periods  She had a supracervical hysterectomy, then later had her cervix removed  Dr OWENSELSY [facility] recommended she see me before proceeding with her next procedure  She thinks she did have abnormal paps in the past  Also c/o no sex drive  We discussed that decreased sex drive is often multifactorial and that there is no quick fix  The main stays of treatment for decreased sex drive is communication with her partner, lubrication and masturbation  Patient is currently in therapy  [facility], I did find a diagnosis of cervical carcinoma in situ  She no longer has a uterus or cervix  We discussed a vaginal cuff Pap  If there is abnormalities, I would likely refer back to Dr Jami Dillon who did her initial surgeries  Health Maintenance:    She exercises 7 days per week  She wears her seatbelt routinely      She does perform regular monthly self breast exams  She feels safe at home  Patients does follow a DASH diet  Last mammogram: current  Last colonoscopy: current       Past Medical History:   Diagnosis Date    Arthritis     DDD    HPV (human papilloma virus) infection 12/2017    found on colonoscopy    Stress     Wears glasses        Past Surgical History:   Procedure Laterality Date    BILATERAL OOPHORECTOMY      COLONOSCOPY      COLONOSCOPY N/A 12/27/2017    Procedure: COLONOSCOPY;  Surgeon: Nancy Moulton MD;  [facility] GI [facility]; Service: Gastroenterology    HYSTERECTOMY      partial    LIPOSUCTION      lipoma    TONSILLECTOMY      WISDOM TOOTH EXTRACTION      x4       Past OB/Gyn History:   No LMP recorded  Patient has had a hysterectomy  Menstrual History:  OB History     No data available           No LMP recorded  Patient has had a hysterectomy  Menstrual history: Patient is post menopausal    History of sexually transmitted infection: HPV  Patient is currently sexually active  heterosexual Birth control: postmenopausal  Last Pap; 10 years ago :  unsure    Family History   Problem Relation Age of Onset    Other Mother      damage to diaphragm s/p surgery    Rheum arthritis Mother     Stroke Mother 61     mini strokes    Diabetes type II Mother 61    Bipolar disorder Mother     Breast cancer Sister 36    Hypertension Paternal Grandmother 79    Addiction problem Son     Bipolar disorder Son     Alcohol abuse Son        Social History:  Social History     Social History    Marital status: /Civil Union     Spouse name: N/A    Number of children: N/A    Years of education: N/A     Occupational History    Not on file       Social History Main Topics    Smoking status: Current Every Day Smoker     Packs/day: 0 40     Years: 10 00    Smokeless tobacco: Never Used    Alcohol use Yes      Comment: rarely    Drug use: No    Sexual activity: Yes     Other Topics Concern    Not on file     Social History Narrative    No narrative on file     Presently lives with   Patient is   Patient is currently employed - works from home  Allergies   Allergen Reactions    Sulfa Antibiotics Rash       Current Outpatient Prescriptions:     ALPRAZolam (XANAX PO), Take by mouth every morning, Disp: , Rfl:     ALPRAZolam (XANAX) 0 25 mg tablet, Take 1 tablet by mouth daily as needed, Disp: , Rfl:     B Complex Vitamins (B COMPLEX 1 PO), Take 1 capsule by mouth daily, Disp: , Rfl:     BIOTIN PO, Take by mouth every morning, Disp: , Rfl:     Boswellia-Glucosamine-Vit D (GLUCOSAMINE COMPLEX PO), Take 1 tablet by mouth daily, Disp: , Rfl:     Calcium Citrate-Vitamin D (CALCIUM CITRATE + D PO), Take by mouth every morning, Disp: , Rfl:     EVENING PRIMROSE OIL PO, Take by mouth every morning, Disp: , Rfl:     FLUoxetine (PROzac) 20 mg capsule, Take 1 capsule (20 mg total) by mouth daily, Disp: 90 capsule, Rfl: 0    Ginkgo Biloba (GINKOBA PO), Take by mouth every morning, Disp: , Rfl:     GLUTATHIONE PO, Take by mouth every morning, Disp: , Rfl:     Misc Natural Products (GLUCOSAMINE CHONDROITIN TRIPLE PO), Take by mouth every morning, Disp: , Rfl:     multivitamin (THERAGRAN) TABS, Take 1 tablet by mouth every morning, Disp: , Rfl:     NON FORMULARY, PQQ-enzyme, Disp: , Rfl:     NON FORMULARY, every morning Asian ginseng, Disp: , Rfl:     Probiotic Product (PROBIOTIC-10 PO), Take 1 capsule by mouth daily, Disp: , Rfl:     varenicline (CHANTIX CONTINUING MONTH FELIPA) 1 mg tablet, Take by mouth, Disp: , Rfl:     Review of Systems:  A complete review of systems was performed and was negative, except as listed  Denies  urinary incontinence, urinary frequency, constipation or bowel changes, blood in stool, severe headaches, vaginal bleeding, vaginal discharge, vaginal dryness     Positive for feeling tired, weight loss (diet and exercise), nasal discharge, hoarseness, dry skin  Physical Exam:  /64   Ht 5' 5" (1 651 m)   Wt 91 9 kg (202 lb 9 6 oz)   BMI 33 71 kg/m²    GEN: The patient was alert and oriented x3, pleasant well-appearing female in no acute distress  HEENT:  Unremarkable, no anterior or posterior lymphadenopathy, no thyromegaly  CV:  RRR, no murmurs  RESP:  Clear to auscultation bilaterally  BREAST:  Symmetric breasts with no palpable breast masses or obvious breast lesions  She has no retractions or nipple discharge  She has no axillary abnormalities or palpable masses  Self breast exam is taught  ABD:  Soft, nontender, non-distended  EXT: nontender, no edema  PELVIC:  Normal appearing external female genitalia, atrophic vaginal epithelium, No discharge  Cervix absent   Bimanual:  No palpable masses  Cuff appears intact with no nodularity is  A vaginal Pap was collected

## 2018-03-29 LAB — HPV RRNA GENITAL QL NAA+PROBE: NORMAL

## 2018-03-30 LAB
LAB AP GYN PRIMARY INTERPRETATION: NORMAL
Lab: NORMAL
PATH INTERP SPEC-IMP: NORMAL

## 2018-04-02 NOTE — PROGRESS NOTES
Neg pap/hpv  Send letter  Please ask pt if symptoms of BV - can treat if symptomatic due to shift in meredith on pap smear

## 2018-04-06 NOTE — PRE-PROCEDURE INSTRUCTIONS
Pre-Surgery Instructions:   Medication Instructions    B Complex Vitamins (B COMPLEX 1 PO) Instructed patient per Anesthesia Guidelines   BIOTIN PO Instructed patient per Anesthesia Guidelines   Boswellia-Glucosamine-Vit D (GLUCOSAMINE COMPLEX PO) Instructed patient per Anesthesia Guidelines   Calcium Citrate-Vitamin D (CALCIUM CITRATE + D PO) Instructed patient per Anesthesia Guidelines   EVENING PRIMROSE OIL PO Instructed patient per Anesthesia Guidelines   FLUoxetine (PROzac) 20 mg capsule Instructed patient per Anesthesia Guidelines   Ginkgo Biloba (GINKOBA PO) Instructed patient per Anesthesia Guidelines   GLUTATHIONE PO Instructed patient per Anesthesia Guidelines   Misc Natural Products (GLUCOSAMINE CHONDROITIN TRIPLE PO) Instructed patient per Anesthesia Guidelines   multivitamin (THERAGRAN) TABS Instructed patient per Anesthesia Guidelines   Probiotic Product (PROBIOTIC-10 PO) Instructed patient per Anesthesia Guidelines  REVIEWED  PRINTED SURGICAL INSTRUCTIONS WITH PATIENT , PATIENT VERBALIZED UNDERSTANDING   MEDICATIONS REVIEWED

## 2018-04-12 ENCOUNTER — ANESTHESIA (OUTPATIENT)
Dept: PERIOP | Facility: HOSPITAL | Age: 58
End: 2018-04-12
Payer: COMMERCIAL

## 2018-04-12 ENCOUNTER — HOSPITAL ENCOUNTER (OUTPATIENT)
Facility: HOSPITAL | Age: 58
Setting detail: OUTPATIENT SURGERY
Discharge: HOME | End: 2018-04-12
Attending: COLON & RECTAL SURGERY | Admitting: COLON & RECTAL SURGERY
Payer: COMMERCIAL

## 2018-04-12 ENCOUNTER — ANESTHESIA EVENT (OUTPATIENT)
Dept: PERIOP | Facility: HOSPITAL | Age: 58
End: 2018-04-12
Payer: COMMERCIAL

## 2018-04-12 VITALS
BODY MASS INDEX: 32.72 KG/M2 | OXYGEN SATURATION: 95 % | TEMPERATURE: 97.1 F | HEART RATE: 60 BPM | SYSTOLIC BLOOD PRESSURE: 120 MMHG | DIASTOLIC BLOOD PRESSURE: 73 MMHG | WEIGHT: 196.38 LBS | HEIGHT: 65 IN | RESPIRATION RATE: 18 BRPM

## 2018-04-12 DIAGNOSIS — A63.0 CONDYLOMA: ICD-10-CM

## 2018-04-12 PROCEDURE — 88305 TISSUE EXAM BY PATHOLOGIST: CPT | Performed by: PATHOLOGY

## 2018-04-12 PROCEDURE — 87252 VIRUS INOCULATION TISSUE: CPT | Performed by: COLON & RECTAL SURGERY

## 2018-04-12 PROCEDURE — 88344 IMHCHEM/IMCYTCHM EA MLT ANTB: CPT | Performed by: PATHOLOGY

## 2018-04-12 RX ORDER — LIDOCAINE HYDROCHLORIDE 10 MG/ML
INJECTION, SOLUTION INFILTRATION; PERINEURAL AS NEEDED
Status: DISCONTINUED | OUTPATIENT
Start: 2018-04-12 | End: 2018-04-12 | Stop reason: HOSPADM

## 2018-04-12 RX ORDER — FENTANYL CITRATE 50 UG/ML
INJECTION, SOLUTION INTRAMUSCULAR; INTRAVENOUS AS NEEDED
Status: DISCONTINUED | OUTPATIENT
Start: 2018-04-12 | End: 2018-04-12 | Stop reason: SURG

## 2018-04-12 RX ORDER — KETAMINE HYDROCHLORIDE 50 MG/ML
INJECTION, SOLUTION, CONCENTRATE INTRAMUSCULAR; INTRAVENOUS AS NEEDED
Status: DISCONTINUED | OUTPATIENT
Start: 2018-04-12 | End: 2018-04-12 | Stop reason: SURG

## 2018-04-12 RX ORDER — ONDANSETRON 2 MG/ML
4 INJECTION INTRAMUSCULAR; INTRAVENOUS ONCE AS NEEDED
Status: DISCONTINUED | OUTPATIENT
Start: 2018-04-12 | End: 2018-04-12 | Stop reason: HOSPADM

## 2018-04-12 RX ORDER — SODIUM CHLORIDE, SODIUM LACTATE, POTASSIUM CHLORIDE, CALCIUM CHLORIDE 600; 310; 30; 20 MG/100ML; MG/100ML; MG/100ML; MG/100ML
50 INJECTION, SOLUTION INTRAVENOUS CONTINUOUS
Status: DISCONTINUED | OUTPATIENT
Start: 2018-04-12 | End: 2018-04-12 | Stop reason: HOSPADM

## 2018-04-12 RX ORDER — SODIUM CHLORIDE, SODIUM LACTATE, POTASSIUM CHLORIDE, CALCIUM CHLORIDE 600; 310; 30; 20 MG/100ML; MG/100ML; MG/100ML; MG/100ML
20 INJECTION, SOLUTION INTRAVENOUS CONTINUOUS
Status: DISCONTINUED | OUTPATIENT
Start: 2018-04-12 | End: 2018-04-12 | Stop reason: HOSPADM

## 2018-04-12 RX ORDER — BUPIVACAINE HYDROCHLORIDE 2.5 MG/ML
INJECTION, SOLUTION INFILTRATION; PERINEURAL AS NEEDED
Status: DISCONTINUED | OUTPATIENT
Start: 2018-04-12 | End: 2018-04-12 | Stop reason: HOSPADM

## 2018-04-12 RX ORDER — PROPOFOL 10 MG/ML
INJECTION, EMULSION INTRAVENOUS CONTINUOUS PRN
Status: DISCONTINUED | OUTPATIENT
Start: 2018-04-12 | End: 2018-04-12 | Stop reason: SURG

## 2018-04-12 RX ORDER — MIDAZOLAM HYDROCHLORIDE 1 MG/ML
INJECTION INTRAMUSCULAR; INTRAVENOUS AS NEEDED
Status: DISCONTINUED | OUTPATIENT
Start: 2018-04-12 | End: 2018-04-12 | Stop reason: SURG

## 2018-04-12 RX ORDER — FENTANYL CITRATE/PF 50 MCG/ML
25 SYRINGE (ML) INJECTION
Status: DISCONTINUED | OUTPATIENT
Start: 2018-04-12 | End: 2018-04-12 | Stop reason: HOSPADM

## 2018-04-12 RX ADMIN — MIDAZOLAM HYDROCHLORIDE 2 MG: 1 INJECTION, SOLUTION INTRAMUSCULAR; INTRAVENOUS at 12:26

## 2018-04-12 RX ADMIN — KETAMINE HYDROCHLORIDE 10 MG: 50 INJECTION, SOLUTION INTRAMUSCULAR; INTRAVENOUS at 12:33

## 2018-04-12 RX ADMIN — FENTANYL CITRATE 25 MCG: 50 INJECTION, SOLUTION INTRAMUSCULAR; INTRAVENOUS at 12:33

## 2018-04-12 RX ADMIN — FENTANYL CITRATE 25 MCG: 50 INJECTION, SOLUTION INTRAMUSCULAR; INTRAVENOUS at 12:44

## 2018-04-12 RX ADMIN — PROPOFOL 100 MCG/KG/MIN: 10 INJECTION, EMULSION INTRAVENOUS at 12:33

## 2018-04-12 RX ADMIN — KETAMINE HYDROCHLORIDE 10 MG: 50 INJECTION, SOLUTION INTRAMUSCULAR; INTRAVENOUS at 12:44

## 2018-04-12 RX ADMIN — SODIUM CHLORIDE, SODIUM LACTATE, POTASSIUM CHLORIDE, AND CALCIUM CHLORIDE 50 ML/HR: .6; .31; .03; .02 INJECTION, SOLUTION INTRAVENOUS at 11:57

## 2018-04-12 RX ADMIN — KETAMINE HYDROCHLORIDE 10 MG: 50 INJECTION, SOLUTION INTRAMUSCULAR; INTRAVENOUS at 13:01

## 2018-04-12 NOTE — OP NOTE
OPERATIVE REPORT  PATIENT NAME: Dano Tillman    :  1960  MRN: [id number]  Pt Location: [facility] OR ROOM 09    SURGERY DATE: 2018    Surgeon(s) and Role:     * Cecilia Dominguez MD - Primary    Preop Diagnosis:  Condyloma [A63 0  Previous colonoscopy-Distal rectal lesions (biopsy):     - Portions of condylomas  - At least low-grade squamous dysplasia; focal high-grade dysplasia cannot be excluded  Post-Op Diagnosis Codes:     * Condyloma [A63 0]    Procedure(s) (LRB):  EXAM UNDER ANESTHESIA (EUA) (N/A)  EXCISION CONDYLOMA ANAL/RECTAL (N/A) -fulguration  Anoscopy    Specimen(s):  ID Type Source Tests Collected by Time Destination   1 : RIGHT RIGO-LATERAL LESION Tissue Other TISSUE EXAM Cecilia Dominguez MD 2018 1308    2 : DIMINUTIVE RECTAL POLYP Tissue Polyp, Colorectal TISSUE EXAM Cecilia Dominguez MD 2018 1301    A : RIGHT RIGO-LATERAL LESION Tissue Other VIRUS CULTURE Cecilia Dominguez MD 2018 1255        Estimated Blood Loss:   25 mL    Anesthesia Type:   IV Sedation with Anesthesia    Operative Indications:  Condyloma [A63 0]    Operative Findings:  Normal external anal/perianal skin  On anoscopy right anterolateral 4-5mm area of lesion appearance of dysplasia, removed with  Metzenbaum/fulgurated, sent for path and HPV serotype  Diminutive distal rectal polyp excised  Tiny few internal anal condyloma excised/fulgurated  Complications:   None    Procedure and Technique:  Suzie Leroy is a 51-year-old female seen by Dr Fernando Nunn for colonoscopy and found to have distal rectal lesions,   - "Portions of condylomas  - At least low-grade squamous dysplasia; focal high-grade dysplasia cannot be excluded "  She had a GYN exam and HPV negative by report  We discussed exam under anesthesia given internal condyloma seen at office not amenable to office procedure    We reviewed anorectal surgery and risks including not limited to bleeding, infection, risks of anesthesia, damage to sphincter/incontinence  She understood these risks, signed informed consent and wished to proceed  She was brought to the operating room where general endotracheal anesthesia was induced without event  No antibiotics were given as none medically indicated  Venodynes were on and running throughout the procedure  She was placed in the prone jackknife position with attention to bony prominences, extremities, and genitalia  Buttocks were taped apart Betadine painted, sterile draped  After timeout was taken per protocol procedure began  Procedure began with pudendal/regional nerve block, digital rectal examination/anoscopy which showed diminutive internal anal condyloma, area of right anterior lateral lesion appearance of dysplasia, as per operative findings removed with combination of Metzenbaum scissors and areas fulgurated  Sent for pathology and HPV sero type  No external anal condyloma  Diminutive distal rectal polyp excised  These were superficial lesions and there was no sphincter approached or encountered  Hemostasis was assured all sponge and needle instrument counts were correct and mesh underwear over 4 x 4's were placed for dressing  Patient was rotated supine and brought to the recovery room in stable condition       I was present/scrubbed for the entire procedure    Patient Disposition:  PACU     SIGNATURE: Meg Moy MD  DATE: April 12, 2018  TIME: 1:23 PM

## 2018-04-12 NOTE — INTERVAL H&P NOTE
H&P reviewed  After examining the patient I find no changes in the patients condition since the H&P had been written  She had a colonoscopy with Dr Get Fernandez 12/27/17 which showed cluster of polypoid lesions at the dentate line and the distal rectum  Pathology results showed portions of condylomas, at least low grade squamous dysplasia; focal high-grade dysplasia can not be excluded  History of precancerous lesions of cervix by her report, status post BILL/BSO by her report benign  We discussed internal condyloma, HPV relation, on exam and anorectal surgery and in a face to face, personal informed consent the alternatives,benefits risks including not limited to bleeding, infection, risks of anesthesia, damage to sphincter/incontinence, possibility of  staged surgery  She understood these risks and wishes to proceed

## 2018-04-12 NOTE — DISCHARGE INSTRUCTIONS
May shower/tub bathe this evening  There will be some bleeding/drainage, normal , may use dry gauze    Followup in office 2-3months Dr Brooke Ruiz  Tylenol or ibuprofen as needed for discomfort  High fiber diet with metamucil or konsyl 1 tbsp 1-2x/day, increased hydration noncaffeinated beverages  May use Miralax as needed if no bowel movements in next 24-48hours  Call if any concerns [phone]

## 2018-04-20 ENCOUNTER — TELEPHONE (OUTPATIENT)
Dept: OBGYN CLINIC | Facility: CLINIC | Age: 58
End: 2018-04-20

## 2018-04-20 DIAGNOSIS — B96.89 BACTERIAL VAGINOSIS: Primary | ICD-10-CM

## 2018-04-20 DIAGNOSIS — N76.0 BACTERIAL VAGINOSIS: Primary | ICD-10-CM

## 2018-04-20 LAB — VIRUS SPEC CULT: NORMAL

## 2018-04-20 RX ORDER — METRONIDAZOLE 500 MG/1
500 TABLET ORAL EVERY 12 HOURS SCHEDULED
Qty: 14 TABLET | Refills: 0 | Status: SHIPPED | OUTPATIENT
Start: 2018-04-20 | End: 2018-04-27

## 2018-04-20 NOTE — TELEPHONE ENCOUNTER
Was seen in late March for her yearly and then was called with results and was told her PH was high  She was asked if she was having any symptoms and if so, we would call something in for her to the pharmacy  At that time she did not have any symptoms, but today she itching/irritation  Wanted to know if you would send RX to pharmacy    [facility] [place]   (she left a message on the nurse line)

## 2018-07-11 DIAGNOSIS — L03.211 CELLULITIS OF FACE: Primary | ICD-10-CM

## 2018-07-11 RX ORDER — CEPHALEXIN 500 MG/1
500 CAPSULE ORAL EVERY 12 HOURS SCHEDULED
Qty: 20 CAPSULE | Refills: 0 | Status: SHIPPED | OUTPATIENT
Start: 2018-07-11 | End: 2018-07-21

## 2018-08-15 NOTE — PRE-PROCEDURE INSTRUCTIONS
Pre-Surgery Instructions:   Medication Instructions    B Complex Vitamins (B COMPLEX 1 PO) Instructed patient per Anesthesia Guidelines   BIOTIN PO Instructed patient per Anesthesia Guidelines   [place]-Glucosamine-Vit D (GLUCOSAMINE COMPLEX PO) Instructed patient per Anesthesia Guidelines   Calcium Citrate-Vitamin D (CALCIUM CITRATE + D PO) Instructed patient per Anesthesia Guidelines   EVENING PRIMROSE OIL PO Instructed patient per Anesthesia Guidelines   FLUoxetine (PROzac) 20 mg capsule Instructed patient per Anesthesia Guidelines   Ginkgo Biloba (GINKOBA PO) Instructed patient per Anesthesia Guidelines   GLUTATHIONE PO Instructed patient per Anesthesia Guidelines   Misc Natural Products (GLUCOSAMINE CHONDROITIN TRIPLE PO) Instructed patient per Anesthesia Guidelines   multivitamin (THERAGRAN) TABS Instructed patient per Anesthesia Guidelines   Probiotic Product (PROBIOTIC-10 PO) Instructed patient per Anesthesia Guidelines  Antidepressant Med Class     Continue to take this medication on your normal schedule  If this is an oral medication and you take it in the morning, then you may take this medicine with a sip of water  Herbal Med Class     Stop taking this herbal medications at least one week prior to surgery/procedure  Pre op instructions reviewed with patient on 8/15    Meds,bathing and hospital instructions reviewed with understanding

## 2018-08-30 ENCOUNTER — ANESTHESIA EVENT (OUTPATIENT)
Dept: PERIOP | Facility: HOSPITAL | Age: 58
End: 2018-08-30
Payer: COMMERCIAL

## 2018-08-30 ENCOUNTER — ANESTHESIA (OUTPATIENT)
Dept: PERIOP | Facility: HOSPITAL | Age: 58
End: 2018-08-30
Payer: COMMERCIAL

## 2018-08-30 ENCOUNTER — HOSPITAL ENCOUNTER (OUTPATIENT)
Facility: HOSPITAL | Age: 58
Setting detail: OUTPATIENT SURGERY
Discharge: HOME | End: 2018-08-30
Attending: COLON & RECTAL SURGERY | Admitting: COLON & RECTAL SURGERY
Payer: COMMERCIAL

## 2018-08-30 VITALS
SYSTOLIC BLOOD PRESSURE: 123 MMHG | RESPIRATION RATE: 18 BRPM | HEIGHT: 65 IN | WEIGHT: 191 LBS | BODY MASS INDEX: 31.82 KG/M2 | TEMPERATURE: 98.9 F | OXYGEN SATURATION: 99 % | HEART RATE: 64 BPM | DIASTOLIC BLOOD PRESSURE: 79 MMHG

## 2018-08-30 DIAGNOSIS — A63.0 ANAL CONDYLOMATA: ICD-10-CM

## 2018-08-30 DIAGNOSIS — D01.3 CARCINOMA IN SITU OF ANUS AND ANAL CANAL: Primary | ICD-10-CM

## 2018-08-30 PROCEDURE — 88305 TISSUE EXAM BY PATHOLOGIST: CPT | Performed by: PATHOLOGY

## 2018-08-30 PROCEDURE — 88344 IMHCHEM/IMCYTCHM EA MLT ANTB: CPT | Performed by: PATHOLOGY

## 2018-08-30 RX ORDER — PROPOFOL 10 MG/ML
INJECTION, EMULSION INTRAVENOUS CONTINUOUS PRN
Status: DISCONTINUED | OUTPATIENT
Start: 2018-08-30 | End: 2018-08-30 | Stop reason: SURG

## 2018-08-30 RX ORDER — FENTANYL CITRATE/PF 50 MCG/ML
25 SYRINGE (ML) INJECTION
Status: DISCONTINUED | OUTPATIENT
Start: 2018-08-30 | End: 2018-08-30 | Stop reason: HOSPADM

## 2018-08-30 RX ORDER — ONDANSETRON 2 MG/ML
INJECTION INTRAMUSCULAR; INTRAVENOUS AS NEEDED
Status: DISCONTINUED | OUTPATIENT
Start: 2018-08-30 | End: 2018-08-30 | Stop reason: SURG

## 2018-08-30 RX ORDER — PROPOFOL 10 MG/ML
INJECTION, EMULSION INTRAVENOUS AS NEEDED
Status: DISCONTINUED | OUTPATIENT
Start: 2018-08-30 | End: 2018-08-30 | Stop reason: SURG

## 2018-08-30 RX ORDER — ACETAMINOPHEN 325 MG/1
650 TABLET ORAL EVERY 6 HOURS PRN
Status: DISCONTINUED | OUTPATIENT
Start: 2018-08-30 | End: 2018-08-30 | Stop reason: HOSPADM

## 2018-08-30 RX ORDER — ONDANSETRON 2 MG/ML
4 INJECTION INTRAMUSCULAR; INTRAVENOUS ONCE AS NEEDED
Status: DISCONTINUED | OUTPATIENT
Start: 2018-08-30 | End: 2018-08-30 | Stop reason: HOSPADM

## 2018-08-30 RX ORDER — ACETIC ACID 5 %
LIQUID (ML) MISCELLANEOUS AS NEEDED
Status: DISCONTINUED | OUTPATIENT
Start: 2018-08-30 | End: 2018-08-30 | Stop reason: HOSPADM

## 2018-08-30 RX ORDER — HYDROCODONE BITARTRATE AND ACETAMINOPHEN 5; 325 MG/1; MG/1
1 TABLET ORAL EVERY 6 HOURS PRN
Qty: 5 TABLET | Refills: 0 | Status: SHIPPED | OUTPATIENT
Start: 2018-08-30 | End: 2018-09-09

## 2018-08-30 RX ORDER — KETAMINE HYDROCHLORIDE 50 MG/ML
INJECTION, SOLUTION, CONCENTRATE INTRAMUSCULAR; INTRAVENOUS AS NEEDED
Status: DISCONTINUED | OUTPATIENT
Start: 2018-08-30 | End: 2018-08-30 | Stop reason: SURG

## 2018-08-30 RX ORDER — GINSENG 100 MG
CAPSULE ORAL AS NEEDED
Status: DISCONTINUED | OUTPATIENT
Start: 2018-08-30 | End: 2018-08-30 | Stop reason: HOSPADM

## 2018-08-30 RX ORDER — MAGNESIUM HYDROXIDE 1200 MG/15ML
LIQUID ORAL AS NEEDED
Status: DISCONTINUED | OUTPATIENT
Start: 2018-08-30 | End: 2018-08-30 | Stop reason: HOSPADM

## 2018-08-30 RX ORDER — OXYCODONE HYDROCHLORIDE 5 MG/1
5 TABLET ORAL EVERY 4 HOURS PRN
Status: DISCONTINUED | OUTPATIENT
Start: 2018-08-30 | End: 2018-08-30 | Stop reason: HOSPADM

## 2018-08-30 RX ORDER — SODIUM CHLORIDE, SODIUM LACTATE, POTASSIUM CHLORIDE, CALCIUM CHLORIDE 600; 310; 30; 20 MG/100ML; MG/100ML; MG/100ML; MG/100ML
20 INJECTION, SOLUTION INTRAVENOUS CONTINUOUS
Status: DISCONTINUED | OUTPATIENT
Start: 2018-08-30 | End: 2018-08-30 | Stop reason: HOSPADM

## 2018-08-30 RX ORDER — BUPIVACAINE HYDROCHLORIDE 2.5 MG/ML
INJECTION, SOLUTION INFILTRATION; PERINEURAL AS NEEDED
Status: DISCONTINUED | OUTPATIENT
Start: 2018-08-30 | End: 2018-08-30 | Stop reason: HOSPADM

## 2018-08-30 RX ORDER — FENTANYL CITRATE 50 UG/ML
INJECTION, SOLUTION INTRAMUSCULAR; INTRAVENOUS AS NEEDED
Status: DISCONTINUED | OUTPATIENT
Start: 2018-08-30 | End: 2018-08-30 | Stop reason: SURG

## 2018-08-30 RX ORDER — MIDAZOLAM HYDROCHLORIDE 1 MG/ML
INJECTION INTRAMUSCULAR; INTRAVENOUS AS NEEDED
Status: DISCONTINUED | OUTPATIENT
Start: 2018-08-30 | End: 2018-08-30 | Stop reason: SURG

## 2018-08-30 RX ORDER — LIDOCAINE HYDROCHLORIDE 10 MG/ML
INJECTION, SOLUTION INFILTRATION; PERINEURAL AS NEEDED
Status: DISCONTINUED | OUTPATIENT
Start: 2018-08-30 | End: 2018-08-30 | Stop reason: HOSPADM

## 2018-08-30 RX ADMIN — KETAMINE HYDROCHLORIDE 10 MG: 50 INJECTION, SOLUTION INTRAMUSCULAR; INTRAVENOUS at 08:37

## 2018-08-30 RX ADMIN — FENTANYL CITRATE 50 MCG: 50 INJECTION, SOLUTION INTRAMUSCULAR; INTRAVENOUS at 08:07

## 2018-08-30 RX ADMIN — KETAMINE HYDROCHLORIDE 10 MG: 50 INJECTION, SOLUTION INTRAMUSCULAR; INTRAVENOUS at 08:33

## 2018-08-30 RX ADMIN — ONDANSETRON 4 MG: 2 INJECTION INTRAMUSCULAR; INTRAVENOUS at 08:16

## 2018-08-30 RX ADMIN — PROPOFOL 100 MCG/KG/MIN: 10 INJECTION, EMULSION INTRAVENOUS at 08:07

## 2018-08-30 RX ADMIN — KETAMINE HYDROCHLORIDE 10 MG: 50 INJECTION, SOLUTION INTRAMUSCULAR; INTRAVENOUS at 08:46

## 2018-08-30 RX ADMIN — SODIUM CHLORIDE, SODIUM LACTATE, POTASSIUM CHLORIDE, AND CALCIUM CHLORIDE: .6; .31; .03; .02 INJECTION, SOLUTION INTRAVENOUS at 07:36

## 2018-08-30 RX ADMIN — KETAMINE HYDROCHLORIDE 10 MG: 50 INJECTION, SOLUTION INTRAMUSCULAR; INTRAVENOUS at 08:07

## 2018-08-30 RX ADMIN — KETAMINE HYDROCHLORIDE 10 MG: 50 INJECTION, SOLUTION INTRAMUSCULAR; INTRAVENOUS at 08:13

## 2018-08-30 RX ADMIN — SODIUM CHLORIDE, SODIUM LACTATE, POTASSIUM CHLORIDE, AND CALCIUM CHLORIDE 20 ML/HR: .6; .31; .03; .02 INJECTION, SOLUTION INTRAVENOUS at 09:19

## 2018-08-30 RX ADMIN — PROPOFOL 50 MG: 10 INJECTION, EMULSION INTRAVENOUS at 08:46

## 2018-08-30 RX ADMIN — MIDAZOLAM 2 MG: 1 INJECTION INTRAMUSCULAR; INTRAVENOUS at 07:58

## 2018-08-30 RX ADMIN — PROPOFOL 30 MG: 10 INJECTION, EMULSION INTRAVENOUS at 08:07

## 2018-08-30 NOTE — OP NOTE
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 3]    Procedure(s) (LRB):  EXAM UNDER ANESTHESIA (EUA) (N/A)  ANOSCOPY HIGH RESOLUTION (N/A)  EXCISION / FULGURATION OF LESIONS (N/A)    Specimen(s):  ID Type Source Tests Collected by Time Destination   1 : right lateral anal lesion Tissue Anus TISSUE EXAM Keenan Langley MD 2018 0905    2 : right anterior lateral anal canal Tissue Anus TISSUE EXAM Keenan Langley MD 2018 7365    3 : right anterior lateral perianal Tissue Anus TISSUE EXAM Keenan Langley MD 2018 8195    4 : posterior midline perianal Tissue Anus TISSUE EXAM Keenan Lagnley MD 2018 4619    5 : left lateral anal canal Tissue Anus TISSUE EXAM Keenan Langley MD 2018 2412        Estimated Blood Loss:   Minimal    Anesthesia Type:   IV Sedation with Anesthesia    Operative Indications:  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 3]    Operative Findings:  Abnormal uptake or mosaicism seen at following locations, excised  1 : right lateral anal lesion Tissue Anus   2 : right anterior lateral anal canal Tissue Anus   3 : right anterior lateral perianal Tissue Anus   4 : posterior midline perianal Tissue Anus   5 : left lateral anal canal Tissue Anus       Complications:   None    Procedure and Technique:  53-year-old female with findings of AIN 3 on previous procedure  We reviewed the original risks of procedure including pain, bleeding, damage to the sphincter complex and incontinence  She was brought to the operating room where sedation was induced without event   Venodyne's were on and running throughout the case  She was placed in the prone jackknife position with attention to all extremities and bony prominences  The buttocks were taped apart and betadine prepped  After timeout was taken per protocol, local/pudendal block with local anesthetic, examination under anesthesia revealed some abnormal appearance to papilla, excised mikebaum  Please refer to the specimen list for all specimens as these were all taken similarly  A 5% acetic acid soaked gauze was then placed in the anal canal and perianal skin and perineum for a full 5 minutes count for acetowhite staining and with visual magnification and accessory lights, the area was reinspected and showed few areas of abnormal uptake or lack of uptake of acetowhite staining  The majority of the squamocolumnar junction appeared normal save for listed specimens taken  The remaining specimens were also biopsied with Metzenbaum scissors or needlepoint electrocautery  There was no deep dissection or dissection toward any area of the sphincter complex  Repeat acetic acid placement showed normal remaining skin and squamocolumnar junction  Bacitracin was placed after hemostasis was assured and 4 x 4 and mesh underwear were placed  All sponge, needle, instrument/RF wand counts were correct and patient was rolled supine, extubated and taken to the recovery room in stable condition       I was present for the entire procedure    Patient Disposition:  PACU     SIGNATURE: Natalia Tucker MD  DATE: August 30, 2018  TIME: 9:05 AM

## 2018-08-30 NOTE — H&P
History and Physical   Colon and Rectal Surgery   Di Hawthorne 56 y o  female MRN: [id number]  Unit/Bed#: OR [place] Encounter: [id number]  08/30/18   7:51 AM      No chief complaint on file  History of Present Illness   HPI:  Di Hawthorne is a 56 y o  female who presents [facility] is a 51-year-old female seen by Dr Lizeth Godoy for colonoscopy and found to have distal rectal lesions,   - "Portions of condylomas    - At least low-grade squamous dysplasia; focal high-grade dysplasia cannot be excluded "  She had a GYN exam and HPV negative by report  On followup excision  A  Skin, anus, right hector-lateral lesion, excision:             - Squamous mucosa with a spectrum of dysplasia that ranges from low to focallly high-grade (AIN I-III)  - Benign colonic mucosa  - No invasive carcinoma is identified    Historical Information   Past Medical History:   Diagnosis Date    Arthritis     DDD    HPV (human papilloma virus) infection 12/2017    found on colonoscopy    Stress     Wears glasses      Past Surgical History:   Procedure Laterality Date    BILATERAL OOPHORECTOMY      COLONOSCOPY      COLONOSCOPY N/A 12/27/2017    Procedure: COLONOSCOPY;  Surgeon: Chiquis Hardy MD;  [facility]; Service: Gastroenterology    HYSTERECTOMY      partial    LIPOMA RESECTION      back    LIPOSUCTION      lipoma    OH DESTRUCTION,ANAL LESION(S),EXTENSIVE N/A 4/12/2018    Procedure: EXCISION CONDYLOMA ANAL/RECTAL;  Surgeon: Catherine Del Rio MD;  Location: [facility];  Service: Colorectal    OH SURG DIAGNOSTIC EXAM, ANORECTAL N/A 4/12/2018    Procedure: EXAM UNDER ANESTHESIA (EUA);   Surgeon: Catherine Del Rio MD;  Location: [facility];  Service: Colorectal    TONSILLECTOMY      WISDOM TOOTH EXTRACTION      x4       Meds/Allergies     Prescriptions Prior to Admission   Medication    B Complex Vitamins (B COMPLEX 1 PO)    BIOTIN PO    [place]-Glucosamine-Vit D (GLUCOSAMINE COMPLEX PO)  Calcium Citrate-Vitamin D (CALCIUM CITRATE + D PO)    EVENING PRIMROSE OIL PO    FLUoxetine (PROzac) 20 mg capsule    Ginkgo Biloba (GINKOBA PO)    GLUTATHIONE PO    Misc Natural Products (GLUCOSAMINE CHONDROITIN TRIPLE PO)    multivitamin (THERAGRAN) TABS    Probiotic Product (PROBIOTIC-10 PO)         Current Facility-Administered Medications:     lactated ringers infusion, 20 mL/hr, Intravenous, Continuous, Henrique Monet MD    Allergies   Allergen Reactions    Sulfa Antibiotics Rash         Social History   History   Alcohol Use    0 6 oz/week    1 Cans of beer per week     Comment: 1 beer a week     History   Drug Use No     History   Smoking Status    Former Smoker    Packs/day: 0 25    Years: 10 00    Quit date: 7/7/2018   Smokeless Tobacco    Never Used     Comment: quit 7/8/2018         Family History:   Family History   Problem Relation Age of Onset    Other Mother         damage to diaphragm s/p surgery    Rheum arthritis Mother     Stroke Mother 61        mini strokes    Diabetes type II Mother 61   [facility] Bipolar disorder Mother     Breast cancer Sister 36    Hypertension Paternal Grandmother 79    Addiction problem Son     Bipolar disorder Son     Alcohol abuse Son          Objective     Current Vitals:   Blood Pressure: 117/72 (08/30/18 7645)  Pulse: 60 (08/30/18 0611)  Temperature: 98 4 °F (36 9 °C) (08/30/18 0611)  Temp Source: Tympanic Core (08/30/18 0611)  Respirations: 16 (08/30/18 0611)  Height: 5' 4 5" (163 8 cm) (08/30/18 3540)  Weight - Scale: 86 6 kg (191 lb) (08/30/18 0625)  SpO2: 96 % (08/30/18 0611)  No intake or output data in the 24 hours ending 08/30/18 0751    Physical Exam:  General:no distress  Eyes:perrla/eomi  ENT:moist mucus membranes  Neck:supple  Pulm:no increased work of breathing  CV:sinus    ASSESSMENT:    We discussed exam under anesthesia/high-resolution anoscopy rule out additional high-grade dysplasia    We reviewed anorectal surgery and risks including not limited to bleeding, infection, risks of anesthesia, damage to sphincter/incontinence  She understood these risks, signed informed consent and wished to proceed    PLAN:  Exam under anesthesia/high resolution anoscopy

## 2018-08-30 NOTE — ANESTHESIA POSTPROCEDURE EVALUATION
Post-Op Assessment Note      CV Status:  Stable    Mental Status:  Alert and awake    Hydration Status:  Euvolemic    PONV Controlled:  Controlled    Airway Patency:  Patent    Post Op Vitals Reviewed: Yes          Staff: CRNA           BP   96/66   Temp   97 8   Pulse  65   Resp   15   SpO2   98%

## 2018-08-30 NOTE — DISCHARGE INSTRUCTIONS
May shower/tub bathe this evening  There may be some bleeding/drainage, normal , may use dry gauze  Followup in office 3-6months, Dr Cecilia Dominguez  Vicodin as prescribed for pain not treated by ibuprofen  High fiber diet with metamucil or konsyl 1 tbsp 1-2x/day, increased hydration noncaffeinated beverages  May use Miralax as needed if no bowel movements in next 24-48hours  Call if any concerns [phone]    Procedural Sedation   WHAT YOU NEED TO KNOW:   Procedural sedation is medicine used during procedures to help you feel relaxed and calm  You will remember little to none of the procedure  After sedation you may feel tired, weak, or unsteady on your feet  You may also have trouble concentrating or short-term memory loss  These symptoms should go away in 24 hours or less  DISCHARGE INSTRUCTIONS:   Call 911 or have someone else call for any of the following:   · You have sudden trouble breathing  · You cannot be woken  Return to the emergency department if:   · You have a severe headache or dizziness  · Your heart is beating faster than usual   Contact your healthcare provider if:   · You have a fever or chills  · Your skin is itchy, swollen, or you have a rash  · You have nausea or are vomiting for more than 8 hours after the procedure  · You have questions or concerns about your condition or care  Self-care:   · Have someone stay with you for 24 hours  This person can drive you to errands and help you do things around the house  This person can also watch for problems  · Rest and do quiet activities for 24 hours  Do not exercise, ride a bike, or play sports  Stand up slowly to prevent dizziness and falls  Take short walks around the house with another person  Slowly return to your usual activities the next day  · Do not drive or use dangerous machines or tools for 24 hours  You may injure yourself or others  Examples include a lawnmower, saw, or drill   Do not return to work for 24 hours if you use dangerous machines or tools for work  · Do not make important decisions for 24 hours  For example, do not sign important papers or invest money  · Drink liquids as directed  Liquids help flush the sedation medicine out of your body  Ask how much liquid to drink each day and which liquids are best for you  · Eat small, frequent meals to prevent nausea and vomiting  Start with clear liquids such as juice or broth  If you do not vomit after clear liquids, you can eat your usual foods  · Do not drink alcohol or take medicines that make you drowsy  This includes medicines that help you sleep and anxiety medicines  Ask your healthcare provider if it is safe for you to take pain medicine  Follow up with your healthcare provider as directed:  Write down your questions so you remember to ask them during your visits  © 2017 [address] Information is for End User's use only and may not be sold, redistributed or otherwise used for commercial purposes  All illustrations and images included in CareNotes® are the copyrighted property of A D A M [employer] Inc  or [employer]  The above information is an  only  It is not intended as medical advice for individual conditions or treatments  Talk to your doctor, nurse or pharmacist before following any medical regimen to see if it is safe and effective for you

## 2018-08-30 NOTE — ANESTHESIA PREPROCEDURE EVALUATION
Review of Systems/Medical History  Patient summary reviewed  Chart reviewed  No history of anesthetic complications (did well with sedation for similar procedure 4/2018)     Cardiovascular  Negative cardio ROS Exercise tolerance (METS): >4,     Pulmonary  Smoker (quit smoking 7/2018) ex-smoker  , Sleep apnea: snores but no known ADRIANA  ,        GI/Hepatic      Comment: Carcinoma in situ of anus and anal canal      Negative  ROS        Endo/Other    Obesity (BMI 32)    GYN    Hysterectomy,        Hematology  Negative hematology ROS      Musculoskeletal  Negative musculoskeletal ROS        Neurology  Negative neurology ROS      Psychology   Anxiety, Depression ,              Physical Exam    Airway    Mallampati score: II  TM Distance: >3 FB  Neck ROM: full     Dental   No notable dental hx     Cardiovascular  Comment: Negative ROS,     Pulmonary      Other Findings        Anesthesia Plan  ASA Score- 2     Anesthesia Type- IV sedation with anesthesia with ASA Monitors  Additional Monitors:   Airway Plan:         Plan Factors-    Induction- intravenous  Postoperative Plan-     Informed Consent- Anesthetic plan and risks discussed with patient and spouse  I personally reviewed this patient with the CRNA  Discussed and agreed on the Anesthesia Plan with the CRNA  Kimberly Yoo

## 2018-10-17 PROBLEM — D01.3 AIN III (ANAL INTRAEPITHELIAL NEOPLASIA III): Status: ACTIVE | Noted: 2018-10-17

## 2018-12-14 ENCOUNTER — TELEPHONE (OUTPATIENT)
Dept: FAMILY MEDICINE CLINIC | Facility: CLINIC | Age: 58
End: 2018-12-14

## 2018-12-28 NOTE — ANESTHESIA PREPROCEDURE EVALUATION
Review of Systems/Medical History  Patient summary reviewed        Cardiovascular   Pulmonary  Smoker cigarette smoker  , Tobacco cessation counseling given ,        GI/Hepatic            Endo/Other     GYN       Hematology   Musculoskeletal    Arthritis     Neurology   Psychology           Physical Exam    Airway    Mallampati score: III  TM Distance: >3 FB  Neck ROM: full     Dental       Cardiovascular      Pulmonary      Other Findings        Anesthesia Plan  ASA Score- 2     Anesthesia Type- IV sedation with anesthesia with ASA Monitors  Additional Monitors:   Airway Plan:         Plan Factors-    Induction- intravenous  Postoperative Plan-     Informed Consent- Anesthetic plan and risks discussed with patient 
no

## 2019-04-01 RX ORDER — FLUOXETINE HYDROCHLORIDE 40 MG/1
40 CAPSULE ORAL
COMMUNITY
End: 2020-02-12 | Stop reason: ALTCHOICE

## 2019-04-03 ENCOUNTER — ANESTHESIA EVENT (OUTPATIENT)
Dept: PERIOP | Facility: HOSPITAL | Age: 59
End: 2019-04-03
Payer: COMMERCIAL

## 2019-04-04 ENCOUNTER — ANESTHESIA (OUTPATIENT)
Dept: PERIOP | Facility: HOSPITAL | Age: 59
End: 2019-04-04
Payer: COMMERCIAL

## 2019-04-04 ENCOUNTER — HOSPITAL ENCOUNTER (OUTPATIENT)
Facility: HOSPITAL | Age: 59
Setting detail: OUTPATIENT SURGERY
Discharge: HOME | End: 2019-04-04
Attending: COLON & RECTAL SURGERY | Admitting: COLON & RECTAL SURGERY
Payer: COMMERCIAL

## 2019-04-04 VITALS
OXYGEN SATURATION: 99 % | WEIGHT: 221 LBS | BODY MASS INDEX: 36.82 KG/M2 | TEMPERATURE: 97 F | RESPIRATION RATE: 16 BRPM | SYSTOLIC BLOOD PRESSURE: 153 MMHG | HEART RATE: 75 BPM | DIASTOLIC BLOOD PRESSURE: 74 MMHG | HEIGHT: 65 IN

## 2019-04-04 DIAGNOSIS — D01.3 AIN III (ANAL INTRAEPITHELIAL NEOPLASIA III): ICD-10-CM

## 2019-04-04 PROCEDURE — 88344 IMHCHEM/IMCYTCHM EA MLT ANTB: CPT | Performed by: PATHOLOGY

## 2019-04-04 PROCEDURE — 88305 TISSUE EXAM BY PATHOLOGIST: CPT | Performed by: PATHOLOGY

## 2019-04-04 PROCEDURE — 46924 DESTRUCTION ANAL LESION(S): CPT | Performed by: COLON & RECTAL SURGERY

## 2019-04-04 PROCEDURE — 46607 DIAGNOSTIC ANOSCOPY & BIOPSY: CPT | Performed by: COLON & RECTAL SURGERY

## 2019-04-04 RX ORDER — FENTANYL CITRATE/PF 50 MCG/ML
25 SYRINGE (ML) INJECTION
Status: DISCONTINUED | OUTPATIENT
Start: 2019-04-04 | End: 2019-04-04 | Stop reason: HOSPADM

## 2019-04-04 RX ORDER — FENTANYL CITRATE 50 UG/ML
INJECTION, SOLUTION INTRAMUSCULAR; INTRAVENOUS AS NEEDED
Status: DISCONTINUED | OUTPATIENT
Start: 2019-04-04 | End: 2019-04-04 | Stop reason: SURG

## 2019-04-04 RX ORDER — ACETIC ACID 5 %
LIQUID (ML) MISCELLANEOUS AS NEEDED
Status: DISCONTINUED | OUTPATIENT
Start: 2019-04-04 | End: 2019-04-04 | Stop reason: HOSPADM

## 2019-04-04 RX ORDER — ONDANSETRON 2 MG/ML
4 INJECTION INTRAMUSCULAR; INTRAVENOUS ONCE AS NEEDED
Status: DISCONTINUED | OUTPATIENT
Start: 2019-04-04 | End: 2019-04-04 | Stop reason: HOSPADM

## 2019-04-04 RX ORDER — LIDOCAINE HYDROCHLORIDE 10 MG/ML
INJECTION, SOLUTION INFILTRATION; PERINEURAL AS NEEDED
Status: DISCONTINUED | OUTPATIENT
Start: 2019-04-04 | End: 2019-04-04 | Stop reason: HOSPADM

## 2019-04-04 RX ORDER — BUPIVACAINE HYDROCHLORIDE 2.5 MG/ML
INJECTION, SOLUTION INFILTRATION; PERINEURAL AS NEEDED
Status: DISCONTINUED | OUTPATIENT
Start: 2019-04-04 | End: 2019-04-04 | Stop reason: HOSPADM

## 2019-04-04 RX ORDER — PROPOFOL 10 MG/ML
INJECTION, EMULSION INTRAVENOUS CONTINUOUS PRN
Status: DISCONTINUED | OUTPATIENT
Start: 2019-04-04 | End: 2019-04-04

## 2019-04-04 RX ORDER — MEPERIDINE HYDROCHLORIDE 25 MG/ML
12.5 INJECTION INTRAMUSCULAR; INTRAVENOUS; SUBCUTANEOUS AS NEEDED
Status: DISCONTINUED | OUTPATIENT
Start: 2019-04-04 | End: 2019-04-04 | Stop reason: HOSPADM

## 2019-04-04 RX ORDER — LABETALOL 20 MG/4 ML (5 MG/ML) INTRAVENOUS SYRINGE
5
Status: DISCONTINUED | OUTPATIENT
Start: 2019-04-04 | End: 2019-04-04 | Stop reason: HOSPADM

## 2019-04-04 RX ORDER — ALBUTEROL SULFATE 2.5 MG/3ML
2.5 SOLUTION RESPIRATORY (INHALATION) ONCE AS NEEDED
Status: DISCONTINUED | OUTPATIENT
Start: 2019-04-04 | End: 2019-04-04 | Stop reason: HOSPADM

## 2019-04-04 RX ORDER — MIDAZOLAM HYDROCHLORIDE 1 MG/ML
INJECTION INTRAMUSCULAR; INTRAVENOUS AS NEEDED
Status: DISCONTINUED | OUTPATIENT
Start: 2019-04-04 | End: 2019-04-04 | Stop reason: SURG

## 2019-04-04 RX ORDER — SODIUM CHLORIDE, SODIUM LACTATE, POTASSIUM CHLORIDE, CALCIUM CHLORIDE 600; 310; 30; 20 MG/100ML; MG/100ML; MG/100ML; MG/100ML
125 INJECTION, SOLUTION INTRAVENOUS CONTINUOUS
Status: DISCONTINUED | OUTPATIENT
Start: 2019-04-04 | End: 2019-04-04 | Stop reason: HOSPADM

## 2019-04-04 RX ORDER — MAGNESIUM HYDROXIDE 1200 MG/15ML
LIQUID ORAL AS NEEDED
Status: DISCONTINUED | OUTPATIENT
Start: 2019-04-04 | End: 2019-04-04 | Stop reason: HOSPADM

## 2019-04-04 RX ADMIN — FENTANYL CITRATE 25 MCG: 50 INJECTION, SOLUTION INTRAMUSCULAR; INTRAVENOUS at 07:51

## 2019-04-04 RX ADMIN — FENTANYL CITRATE 50 MCG: 50 INJECTION, SOLUTION INTRAMUSCULAR; INTRAVENOUS at 08:39

## 2019-04-04 RX ADMIN — PROPOFOL 80 MCG/KG/MIN: 10 INJECTION, EMULSION INTRAVENOUS at 07:51

## 2019-04-04 RX ADMIN — SODIUM CHLORIDE, SODIUM LACTATE, POTASSIUM CHLORIDE, AND CALCIUM CHLORIDE: .6; .31; .03; .02 INJECTION, SOLUTION INTRAVENOUS at 07:41

## 2019-04-04 RX ADMIN — FENTANYL CITRATE 25 MCG: 50 INJECTION, SOLUTION INTRAMUSCULAR; INTRAVENOUS at 07:55

## 2019-04-04 RX ADMIN — MIDAZOLAM 2 MG: 1 INJECTION INTRAMUSCULAR; INTRAVENOUS at 07:45

## 2019-04-05 ENCOUNTER — OFFICE VISIT (OUTPATIENT)
Dept: FAMILY MEDICINE CLINIC | Facility: CLINIC | Age: 59
End: 2019-04-05
Payer: COMMERCIAL

## 2019-04-05 VITALS
HEIGHT: 65 IN | RESPIRATION RATE: 18 BRPM | HEART RATE: 76 BPM | DIASTOLIC BLOOD PRESSURE: 82 MMHG | SYSTOLIC BLOOD PRESSURE: 128 MMHG | TEMPERATURE: 97.6 F | BODY MASS INDEX: 37.62 KG/M2 | WEIGHT: 225.8 LBS

## 2019-04-05 DIAGNOSIS — Z13.6 SCREENING FOR CARDIOVASCULAR CONDITION: ICD-10-CM

## 2019-04-05 DIAGNOSIS — Z23 NEED FOR VACCINATION: ICD-10-CM

## 2019-04-05 DIAGNOSIS — R06.83 SNORES: Primary | ICD-10-CM

## 2019-04-05 DIAGNOSIS — Z11.59 NEED FOR HEPATITIS C SCREENING TEST: ICD-10-CM

## 2019-04-05 DIAGNOSIS — E66.01 SEVERE OBESITY (BMI 35.0-39.9) WITH COMORBIDITY (HCC): ICD-10-CM

## 2019-04-05 DIAGNOSIS — R53.83 FATIGUE, UNSPECIFIED TYPE: ICD-10-CM

## 2019-04-05 PROCEDURE — 90471 IMMUNIZATION ADMIN: CPT

## 2019-04-05 PROCEDURE — 90715 TDAP VACCINE 7 YRS/> IM: CPT

## 2019-04-05 PROCEDURE — 99213 OFFICE O/P EST LOW 20 MIN: CPT | Performed by: FAMILY MEDICINE

## 2019-04-06 ENCOUNTER — OFFICE VISIT (OUTPATIENT)
Dept: SLEEP CENTER | Facility: CLINIC | Age: 59
End: 2019-04-06
Payer: COMMERCIAL

## 2019-04-06 VITALS
DIASTOLIC BLOOD PRESSURE: 81 MMHG | WEIGHT: 224 LBS | HEIGHT: 67 IN | HEART RATE: 69 BPM | SYSTOLIC BLOOD PRESSURE: 146 MMHG | BODY MASS INDEX: 35.16 KG/M2

## 2019-04-06 DIAGNOSIS — F32.A ANXIETY AND DEPRESSION: ICD-10-CM

## 2019-04-06 DIAGNOSIS — F41.9 ANXIETY AND DEPRESSION: ICD-10-CM

## 2019-04-06 DIAGNOSIS — E66.01 SEVERE OBESITY (BMI 35.0-39.9) WITH COMORBIDITY (HCC): ICD-10-CM

## 2019-04-06 DIAGNOSIS — R06.83 SNORES: Primary | ICD-10-CM

## 2019-04-06 DIAGNOSIS — F45.8 BRUXISM: ICD-10-CM

## 2019-04-06 DIAGNOSIS — J30.1 SEASONAL ALLERGIC RHINITIS DUE TO POLLEN: ICD-10-CM

## 2019-04-06 DIAGNOSIS — R53.83 FATIGUE, UNSPECIFIED TYPE: ICD-10-CM

## 2019-04-06 DIAGNOSIS — R23.2 HOT FLASHES: ICD-10-CM

## 2019-04-06 DIAGNOSIS — G47.10 HYPERSOMNIA: ICD-10-CM

## 2019-04-06 PROCEDURE — 99244 OFF/OP CNSLTJ NEW/EST MOD 40: CPT | Performed by: INTERNAL MEDICINE

## 2019-04-08 LAB
ALBUMIN SERPL-MCNC: 4.2 G/DL (ref 3.5–5.5)
ALBUMIN/GLOB SERPL: 1.5 {RATIO} (ref 1.2–2.2)
ALP SERPL-CCNC: 53 IU/L (ref 39–117)
ALT SERPL-CCNC: 14 IU/L (ref 0–32)
AST SERPL-CCNC: 15 IU/L (ref 0–40)
B BURGDOR IGG+IGM SER-ACNC: <0.91 ISR (ref 0–0.9)
BASOPHILS # BLD AUTO: 0 X10E3/UL (ref 0–0.2)
BASOPHILS NFR BLD AUTO: 0 %
BILIRUB SERPL-MCNC: 0.3 MG/DL (ref 0–1.2)
BUN SERPL-MCNC: 13 MG/DL (ref 6–24)
BUN/CREAT SERPL: 18 (ref 9–23)
CALCIUM SERPL-MCNC: 9.8 MG/DL (ref 8.7–10.2)
CHLORIDE SERPL-SCNC: 99 MMOL/L (ref 96–106)
CHOLEST SERPL-MCNC: 203 MG/DL (ref 100–199)
CO2 SERPL-SCNC: 28 MMOL/L (ref 20–29)
CREAT SERPL-MCNC: 0.71 MG/DL (ref 0.57–1)
EOSINOPHIL # BLD AUTO: 0.2 X10E3/UL (ref 0–0.4)
EOSINOPHIL NFR BLD AUTO: 3 %
ERYTHROCYTE [DISTWIDTH] IN BLOOD BY AUTOMATED COUNT: 13.7 % (ref 12.3–15.4)
GLOBULIN SER-MCNC: 2.8 G/DL (ref 1.5–4.5)
GLUCOSE SERPL-MCNC: 103 MG/DL (ref 65–99)
HCT VFR BLD AUTO: 41.7 % (ref 34–46.6)
HCV AB S/CO SERPL IA: <0.1 S/CO RATIO (ref 0–0.9)
HDLC SERPL-MCNC: 63 MG/DL
HGB BLD-MCNC: 13.7 G/DL (ref 11.1–15.9)
IMM GRANULOCYTES # BLD: 0 X10E3/UL (ref 0–0.1)
IMM GRANULOCYTES NFR BLD: 0 %
LABCORP COMMENT: NORMAL
LDLC SERPL CALC-MCNC: 116 MG/DL (ref 0–99)
LYMPHOCYTES # BLD AUTO: 1.8 X10E3/UL (ref 0.7–3.1)
LYMPHOCYTES NFR BLD AUTO: 29 %
MCH RBC QN AUTO: 30 PG (ref 26.6–33)
MCHC RBC AUTO-ENTMCNC: 32.9 G/DL (ref 31.5–35.7)
MCV RBC AUTO: 91 FL (ref 79–97)
MICRODELETION SYND BLD/T FISH: NORMAL
MONOCYTES # BLD AUTO: 0.4 X10E3/UL (ref 0.1–0.9)
MONOCYTES NFR BLD AUTO: 7 %
NEUTROPHILS # BLD AUTO: 3.8 X10E3/UL (ref 1.4–7)
NEUTROPHILS NFR BLD AUTO: 61 %
PLATELET # BLD AUTO: 238 X10E3/UL (ref 150–379)
POTASSIUM SERPL-SCNC: 4.9 MMOL/L (ref 3.5–5.2)
PROT SERPL-MCNC: 7 G/DL (ref 6–8.5)
RBC # BLD AUTO: 4.57 X10E6/UL (ref 3.77–5.28)
SL AMB EGFR AFRICAN AMERICAN: 110 ML/MIN/1.73
SL AMB EGFR NON AFRICAN AMERICAN: 96 ML/MIN/1.73
SODIUM SERPL-SCNC: 140 MMOL/L (ref 134–144)
TRIGL SERPL-MCNC: 119 MG/DL (ref 0–149)
TSH SERPL DL<=0.005 MIU/L-ACNC: 2.07 UIU/ML (ref 0.45–4.5)
WBC # BLD AUTO: 6.3 X10E3/UL (ref 3.4–10.8)

## 2019-04-09 ENCOUNTER — ANNUAL EXAM (OUTPATIENT)
Dept: OBGYN CLINIC | Facility: CLINIC | Age: 59
End: 2019-04-09
Payer: COMMERCIAL

## 2019-04-09 VITALS — SYSTOLIC BLOOD PRESSURE: 118 MMHG | BODY MASS INDEX: 35.37 KG/M2 | DIASTOLIC BLOOD PRESSURE: 74 MMHG | WEIGHT: 225.8 LBS

## 2019-04-09 DIAGNOSIS — Z01.419 WOMEN'S ANNUAL ROUTINE GYNECOLOGICAL EXAMINATION: Primary | ICD-10-CM

## 2019-04-09 DIAGNOSIS — Z12.31 ENCOUNTER FOR SCREENING MAMMOGRAM FOR MALIGNANT NEOPLASM OF BREAST: ICD-10-CM

## 2019-04-09 PROCEDURE — 99396 PREV VISIT EST AGE 40-64: CPT | Performed by: OBSTETRICS & GYNECOLOGY

## 2019-04-11 ENCOUNTER — TELEPHONE (OUTPATIENT)
Dept: FAMILY MEDICINE CLINIC | Facility: CLINIC | Age: 59
End: 2019-04-11

## 2019-04-18 ENCOUNTER — TRANSCRIBE ORDERS (OUTPATIENT)
Dept: SLEEP CENTER | Facility: CLINIC | Age: 59
End: 2019-04-18

## 2019-04-18 DIAGNOSIS — R06.83 SNORES: Primary | ICD-10-CM

## 2019-04-18 DIAGNOSIS — G47.10 HYPERSOMNIA: ICD-10-CM

## 2019-04-23 ENCOUNTER — HOSPITAL ENCOUNTER (OUTPATIENT)
Dept: SLEEP CENTER | Facility: CLINIC | Age: 59
Discharge: HOME | End: 2019-04-23
Payer: COMMERCIAL

## 2019-04-23 DIAGNOSIS — G47.10 HYPERSOMNIA: ICD-10-CM

## 2019-04-23 DIAGNOSIS — R06.83 SNORES: ICD-10-CM

## 2019-04-23 PROCEDURE — G0399 HOME SLEEP TEST/TYPE 3 PORTA: HCPCS

## 2019-04-25 ENCOUNTER — TELEPHONE (OUTPATIENT)
Dept: SLEEP CENTER | Facility: CLINIC | Age: 59
End: 2019-04-25

## 2019-04-25 DIAGNOSIS — G47.33 OSA (OBSTRUCTIVE SLEEP APNEA): Primary | ICD-10-CM

## 2019-05-21 ENCOUNTER — TRANSCRIBE ORDERS (OUTPATIENT)
Dept: SLEEP CENTER | Facility: CLINIC | Age: 59
End: 2019-05-21

## 2019-05-21 DIAGNOSIS — G47.33 OSA (OBSTRUCTIVE SLEEP APNEA): Primary | ICD-10-CM

## 2019-05-30 ENCOUNTER — TELEPHONE (OUTPATIENT)
Dept: SLEEP CENTER | Facility: CLINIC | Age: 59
End: 2019-05-30

## 2019-06-17 ENCOUNTER — OFFICE VISIT (OUTPATIENT)
Dept: SLEEP CENTER | Facility: CLINIC | Age: 59
End: 2019-06-17
Payer: COMMERCIAL

## 2019-06-17 VITALS
SYSTOLIC BLOOD PRESSURE: 116 MMHG | DIASTOLIC BLOOD PRESSURE: 74 MMHG | HEART RATE: 68 BPM | BODY MASS INDEX: 36.1 KG/M2 | WEIGHT: 230 LBS | HEIGHT: 67 IN

## 2019-06-17 DIAGNOSIS — F41.9 ANXIETY AND DEPRESSION: ICD-10-CM

## 2019-06-17 DIAGNOSIS — G47.9 SLEEP DISTURBANCE: ICD-10-CM

## 2019-06-17 DIAGNOSIS — J30.1 SEASONAL ALLERGIC RHINITIS DUE TO POLLEN: ICD-10-CM

## 2019-06-17 DIAGNOSIS — F32.A ANXIETY AND DEPRESSION: ICD-10-CM

## 2019-06-17 DIAGNOSIS — G47.33 OSA (OBSTRUCTIVE SLEEP APNEA): Primary | ICD-10-CM

## 2019-06-17 DIAGNOSIS — R23.2 HOT FLASHES: ICD-10-CM

## 2019-06-17 DIAGNOSIS — F45.8 BRUXISM: ICD-10-CM

## 2019-06-17 DIAGNOSIS — G47.10 HYPERSOMNIA: ICD-10-CM

## 2019-06-17 DIAGNOSIS — E66.01 SEVERE OBESITY (BMI 35.0-39.9) WITH COMORBIDITY (HCC): ICD-10-CM

## 2019-06-17 PROCEDURE — 99214 OFFICE O/P EST MOD 30 MIN: CPT | Performed by: INTERNAL MEDICINE

## 2019-06-24 ENCOUNTER — OFFICE VISIT (OUTPATIENT)
Dept: FAMILY MEDICINE CLINIC | Facility: CLINIC | Age: 59
End: 2019-06-24
Payer: COMMERCIAL

## 2019-06-24 ENCOUNTER — TELEPHONE (OUTPATIENT)
Dept: FAMILY MEDICINE CLINIC | Facility: CLINIC | Age: 59
End: 2019-06-24

## 2019-06-24 VITALS
WEIGHT: 231 LBS | TEMPERATURE: 98 F | HEART RATE: 62 BPM | HEIGHT: 67 IN | DIASTOLIC BLOOD PRESSURE: 70 MMHG | RESPIRATION RATE: 18 BRPM | SYSTOLIC BLOOD PRESSURE: 118 MMHG | BODY MASS INDEX: 36.26 KG/M2

## 2019-06-24 DIAGNOSIS — J02.9 ACUTE PHARYNGITIS, UNSPECIFIED ETIOLOGY: Primary | ICD-10-CM

## 2019-06-24 PROCEDURE — 3008F BODY MASS INDEX DOCD: CPT | Performed by: NURSE PRACTITIONER

## 2019-06-24 PROCEDURE — 99213 OFFICE O/P EST LOW 20 MIN: CPT | Performed by: NURSE PRACTITIONER

## 2019-06-24 RX ORDER — AMOXICILLIN 875 MG/1
875 TABLET, COATED ORAL 2 TIMES DAILY
Qty: 20 TABLET | Refills: 0 | Status: SHIPPED | OUTPATIENT
Start: 2019-06-24 | End: 2019-07-04

## 2019-06-24 RX ORDER — METHYLPREDNISOLONE 4 MG/1
TABLET ORAL
Qty: 21 TABLET | Refills: 0 | Status: SHIPPED | OUTPATIENT
Start: 2019-06-24 | End: 2019-07-15 | Stop reason: ALTCHOICE

## 2019-08-03 ENCOUNTER — OFFICE VISIT (OUTPATIENT)
Dept: SLEEP CENTER | Facility: CLINIC | Age: 59
End: 2019-08-03
Payer: COMMERCIAL

## 2019-08-03 VITALS
HEIGHT: 64 IN | WEIGHT: 232 LBS | SYSTOLIC BLOOD PRESSURE: 132 MMHG | HEART RATE: 66 BPM | DIASTOLIC BLOOD PRESSURE: 82 MMHG | BODY MASS INDEX: 39.61 KG/M2

## 2019-08-03 DIAGNOSIS — F41.9 ANXIETY AND DEPRESSION: ICD-10-CM

## 2019-08-03 DIAGNOSIS — G47.10 HYPERSOMNIA: ICD-10-CM

## 2019-08-03 DIAGNOSIS — F45.8 BRUXISM: ICD-10-CM

## 2019-08-03 DIAGNOSIS — R68.2 DRY MOUTH: ICD-10-CM

## 2019-08-03 DIAGNOSIS — G47.33 OSA (OBSTRUCTIVE SLEEP APNEA): Primary | ICD-10-CM

## 2019-08-03 DIAGNOSIS — F32.A ANXIETY AND DEPRESSION: ICD-10-CM

## 2019-08-03 DIAGNOSIS — J34.89 DRY NOSE: ICD-10-CM

## 2019-08-03 DIAGNOSIS — E66.9 OBESITY (BMI 30-39.9): ICD-10-CM

## 2019-08-03 PROCEDURE — 99214 OFFICE O/P EST MOD 30 MIN: CPT | Performed by: INTERNAL MEDICINE

## 2019-08-03 NOTE — PROGRESS NOTES
Review of Systems      Genitourinary post menopausal (no peroids)   Cardiology none   Gastrointestinal none   Neurology numbness/tingling of an extremity   Constitutional none   Integumentary none   Psychiatry anxiety and depression   Musculoskeletal none   Pulmonary none   ENT none   Endocrine excessive thirst   Hematological none

## 2019-08-03 NOTE — PROGRESS NOTES
Follow-Up Note - [address]  66 y o  female  :1960  [facility]:[id number]    CC: I saw this patient for follow-up in clinic today for her Sleep Disordered Breathing, Coexisting Sleep and Medical Problems  PFSH, Problem List, Medications & Allergies were reviewed in EMR  Interval changes: none reported  She  has a past medical history of Arthritis, Costochondritis, HPV (human papilloma virus) infection (2017), ADRIANA (obstructive sleep apnea), Stress, and Wears glasses  She has a current medication list which includes the following prescription(s): b complex vitamins, biotin, calcium citrate-vitamin d, evening primrose oil, fluoxetine, ginkgo biloba, glutathione, multivitamin, and probiotic product  ROS: Reviewed (see attached)  Significant for mood is stable on current medications  Hot flashes do not disturb sleep  [facility] [employer] DATA REVIEWED:  In the past 30 days, using PAP > 4 hours/night >70% of the time  Estimated NAVEEN <5/hour at pressure of 14 2cm H2O @90th percentile  She had a sinus infection and was unable to use the machine for several days initially  SUBJECTIVE: Regarding use of PAP, Gemma Ihsan reports:   · She is experiencing some adverse effects: dry mouth and dry nose  · She is   benefiting from use: sleeping better and more rested    · She uses a dental guard for bruxism  Sleep Routine: She reports getting 7 5 hrs sleep  ; she has no difficulty initiating or maintaining sleep   She awakens with the aid of an alarm and feels refreshed  She has daytime drowsiness and naps for approximately an hour  She rated herself at Total score: 4 /24 on the [place] sleepiness scale  Habits: reports that she quit smoking about 12 months ago  She has a 2 50 pack-year smoking history  She has never used smokeless tobacco ,  reports that she drinks alcohol ,  reports that she does not use drugs  , Caffeine use: limited , Exercise routine: none         OBJECTIVE: /82   Pulse 66   Ht 5' 4" (1 626 m)   Wt 105 kg (232 lb)   BMI 39 82 kg/m²    Constitutional: Patient is well groomed; well appearing  Skin/Extrem: warm & dry; col & hydration normal; no edema  Psych: cooperativeand in no distress  Normal mood, affect & thought   CNS: Alert, orientated, clear & coherent speech  H&N: EOMI; NC/AT:no facial pressure marks, no rashes  ENMT Mucus membranes normal Nasal airway:patent  Oral airway: crowded  Resp:effort is normal CVS: RRR ABD:truncal obesity MSK:Gait normal     ASSESSMENT: Primary Sleep/Secondary(to Medical or Psych conditions) & comorbidities   1  ADRIANA (obstructive sleep apnea)     2  Hypersomnia     3  Bruxism     4  Dry mouth     5  Dry nose     6  Anxiety and depression     7  Obesity (BMI 30-39  9)       PLAN:  1  Treatment with  PAP is medically necessary and Jose Ornelas is agreable to continue use  2  Care of equipment, methods to improve comfort using PAP and importance of compliance with therapy were discussed  3  Pressure setting: change 11-16 cmH2O     4  Rx provided to replace supplies and Care coordinated with DME provider  5  Strategies for weight reduction were discussed  6  Strategies to improve dry mouth were discussed  Specifically, adequate treatment of nasal allergies, using Biotene mouthwash &/or Xylimelt  7  Follow-up is advised in 1 year or sooner if needed to monitor progress, compliance and to adjust therapy  Thank you for allowing me to participate in the care of this patient      Sincerely,    Authenticated electronically by Stacy Chinchilla MD on 75/88/12   Board Certified Specialist

## 2019-08-06 ENCOUNTER — TELEPHONE (OUTPATIENT)
Dept: FAMILY MEDICINE CLINIC | Facility: CLINIC | Age: 59
End: 2019-08-06

## 2019-08-06 DIAGNOSIS — Z13.6 SCREENING FOR CARDIOVASCULAR CONDITION: Primary | ICD-10-CM

## 2019-09-23 ENCOUNTER — ANESTHESIA EVENT (OUTPATIENT)
Dept: PERIOP | Facility: AMBULARY SURGERY CENTER | Age: 59
End: 2019-09-23
Payer: COMMERCIAL

## 2019-09-26 NOTE — PRE-PROCEDURE INSTRUCTIONS
Pre-Surgery Instructions:   Medication Instructions    B Complex Vitamins (B COMPLEX 1 PO) Instructed patient per Anesthesia Guidelines   BIOTIN PO Instructed patient per Anesthesia Guidelines   Calcium Citrate-Vitamin D (CALCIUM CITRATE + D PO) Instructed patient per Anesthesia Guidelines   EVENING PRIMROSE OIL PO Instructed patient per Anesthesia Guidelines   FLUoxetine (PROzac) 40 MG capsule Instructed patient per Anesthesia Guidelines   Ginkgo Biloba (GINKOBA PO) Instructed patient per Anesthesia Guidelines   GLUTATHIONE PO Instructed patient per Anesthesia Guidelines   multivitamin (THERAGRAN) TABS Instructed patient per Anesthesia Guidelines   Probiotic Product (PROBIOTIC-10 PO) Instructed patient per Anesthesia Guidelines      Pre op,medications and showering instructions using an antibacterial soap reviewed

## 2019-10-04 ENCOUNTER — ANESTHESIA (OUTPATIENT)
Dept: PERIOP | Facility: AMBULARY SURGERY CENTER | Age: 59
End: 2019-10-04
Payer: COMMERCIAL

## 2019-10-04 ENCOUNTER — HOSPITAL ENCOUNTER (OUTPATIENT)
Facility: AMBULARY SURGERY CENTER | Age: 59
Setting detail: OUTPATIENT SURGERY
Discharge: HOME | End: 2019-10-04
Attending: COLON & RECTAL SURGERY | Admitting: COLON & RECTAL SURGERY
Payer: COMMERCIAL

## 2019-10-04 VITALS
BODY MASS INDEX: 39.86 KG/M2 | RESPIRATION RATE: 18 BRPM | OXYGEN SATURATION: 97 % | HEIGHT: 64 IN | TEMPERATURE: 97.4 F | WEIGHT: 233.5 LBS | SYSTOLIC BLOOD PRESSURE: 121 MMHG | DIASTOLIC BLOOD PRESSURE: 59 MMHG | HEART RATE: 96 BPM

## 2019-10-04 DIAGNOSIS — D01.3 AIN III (ANAL INTRAEPITHELIAL NEOPLASIA III): ICD-10-CM

## 2019-10-04 PROCEDURE — 88344 IMHCHEM/IMCYTCHM EA MLT ANTB: CPT | Performed by: PATHOLOGY

## 2019-10-04 PROCEDURE — 46607 DIAGNOSTIC ANOSCOPY & BIOPSY: CPT | Performed by: COLON & RECTAL SURGERY

## 2019-10-04 PROCEDURE — 88305 TISSUE EXAM BY PATHOLOGIST: CPT | Performed by: PATHOLOGY

## 2019-10-04 RX ORDER — FENTANYL CITRATE/PF 50 MCG/ML
50 SYRINGE (ML) INJECTION
Status: DISCONTINUED | OUTPATIENT
Start: 2019-10-04 | End: 2019-10-04 | Stop reason: HOSPADM

## 2019-10-04 RX ORDER — LIDOCAINE HYDROCHLORIDE 10 MG/ML
INJECTION, SOLUTION INFILTRATION; PERINEURAL AS NEEDED
Status: DISCONTINUED | OUTPATIENT
Start: 2019-10-04 | End: 2019-10-04 | Stop reason: HOSPADM

## 2019-10-04 RX ORDER — MAGNESIUM HYDROXIDE 1200 MG/15ML
LIQUID ORAL AS NEEDED
Status: DISCONTINUED | OUTPATIENT
Start: 2019-10-04 | End: 2019-10-04 | Stop reason: HOSPADM

## 2019-10-04 RX ORDER — ONDANSETRON 2 MG/ML
4 INJECTION INTRAMUSCULAR; INTRAVENOUS ONCE AS NEEDED
Status: DISCONTINUED | OUTPATIENT
Start: 2019-10-04 | End: 2019-10-04 | Stop reason: HOSPADM

## 2019-10-04 RX ORDER — SODIUM CHLORIDE, SODIUM LACTATE, POTASSIUM CHLORIDE, CALCIUM CHLORIDE 600; 310; 30; 20 MG/100ML; MG/100ML; MG/100ML; MG/100ML
125 INJECTION, SOLUTION INTRAVENOUS CONTINUOUS
Status: DISCONTINUED | OUTPATIENT
Start: 2019-10-04 | End: 2019-10-04 | Stop reason: HOSPADM

## 2019-10-04 RX ORDER — METOCLOPRAMIDE HYDROCHLORIDE 5 MG/ML
10 INJECTION INTRAMUSCULAR; INTRAVENOUS ONCE AS NEEDED
Status: DISCONTINUED | OUTPATIENT
Start: 2019-10-04 | End: 2019-10-04 | Stop reason: HOSPADM

## 2019-10-04 RX ORDER — KETOROLAC TROMETHAMINE 30 MG/ML
INJECTION, SOLUTION INTRAMUSCULAR; INTRAVENOUS AS NEEDED
Status: DISCONTINUED | OUTPATIENT
Start: 2019-10-04 | End: 2019-10-04 | Stop reason: SURG

## 2019-10-04 RX ORDER — GLYCOPYRROLATE 0.2 MG/ML
INJECTION INTRAMUSCULAR; INTRAVENOUS AS NEEDED
Status: DISCONTINUED | OUTPATIENT
Start: 2019-10-04 | End: 2019-10-04 | Stop reason: SURG

## 2019-10-04 RX ORDER — ACETIC ACID 5 %
LIQUID (ML) MISCELLANEOUS AS NEEDED
Status: DISCONTINUED | OUTPATIENT
Start: 2019-10-04 | End: 2019-10-04 | Stop reason: HOSPADM

## 2019-10-04 RX ORDER — LIDOCAINE HYDROCHLORIDE 10 MG/ML
INJECTION, SOLUTION INFILTRATION; PERINEURAL AS NEEDED
Status: DISCONTINUED | OUTPATIENT
Start: 2019-10-04 | End: 2019-10-04 | Stop reason: SURG

## 2019-10-04 RX ORDER — PROPOFOL 10 MG/ML
INJECTION, EMULSION INTRAVENOUS AS NEEDED
Status: DISCONTINUED | OUTPATIENT
Start: 2019-10-04 | End: 2019-10-04 | Stop reason: SURG

## 2019-10-04 RX ORDER — GINSENG 100 MG
CAPSULE ORAL AS NEEDED
Status: DISCONTINUED | OUTPATIENT
Start: 2019-10-04 | End: 2019-10-04 | Stop reason: HOSPADM

## 2019-10-04 RX ORDER — BUPIVACAINE HYDROCHLORIDE 2.5 MG/ML
INJECTION, SOLUTION INFILTRATION; PERINEURAL AS NEEDED
Status: DISCONTINUED | OUTPATIENT
Start: 2019-10-04 | End: 2019-10-04 | Stop reason: HOSPADM

## 2019-10-04 RX ADMIN — KETOROLAC TROMETHAMINE 30 MG: 30 INJECTION, SOLUTION INTRAMUSCULAR at 13:28

## 2019-10-04 RX ADMIN — PROPOFOL 50 MG: 10 INJECTION, EMULSION INTRAVENOUS at 13:15

## 2019-10-04 RX ADMIN — PROPOFOL 50 MG: 10 INJECTION, EMULSION INTRAVENOUS at 13:24

## 2019-10-04 RX ADMIN — GLYCOPYRROLATE 0.2 MG: 0.2 INJECTION, SOLUTION INTRAMUSCULAR; INTRAVENOUS at 12:55

## 2019-10-04 RX ADMIN — FENTANYL CITRATE 50 MCG: 50 INJECTION INTRAMUSCULAR; INTRAVENOUS at 13:56

## 2019-10-04 RX ADMIN — PROPOFOL 50 MG: 10 INJECTION, EMULSION INTRAVENOUS at 13:18

## 2019-10-04 RX ADMIN — PROPOFOL 50 MG: 10 INJECTION, EMULSION INTRAVENOUS at 13:07

## 2019-10-04 RX ADMIN — PROPOFOL 50 MG: 10 INJECTION, EMULSION INTRAVENOUS at 13:17

## 2019-10-04 RX ADMIN — PROPOFOL 50 MG: 10 INJECTION, EMULSION INTRAVENOUS at 12:58

## 2019-10-04 RX ADMIN — PROPOFOL 50 MG: 10 INJECTION, EMULSION INTRAVENOUS at 13:06

## 2019-10-04 RX ADMIN — PROPOFOL 50 MG: 10 INJECTION, EMULSION INTRAVENOUS at 13:10

## 2019-10-04 RX ADMIN — FENTANYL CITRATE 50 MCG: 50 INJECTION INTRAMUSCULAR; INTRAVENOUS at 13:51

## 2019-10-04 RX ADMIN — PROPOFOL 50 MG: 10 INJECTION, EMULSION INTRAVENOUS at 13:21

## 2019-10-04 RX ADMIN — SODIUM CHLORIDE, SODIUM LACTATE, POTASSIUM CHLORIDE, AND CALCIUM CHLORIDE: .6; .31; .03; .02 INJECTION, SOLUTION INTRAVENOUS at 12:10

## 2019-10-04 RX ADMIN — PROPOFOL 50 MG: 10 INJECTION, EMULSION INTRAVENOUS at 13:26

## 2019-10-04 RX ADMIN — PROPOFOL 50 MG: 10 INJECTION, EMULSION INTRAVENOUS at 13:03

## 2019-10-04 RX ADMIN — LIDOCAINE HYDROCHLORIDE 50 MG: 10 INJECTION, SOLUTION INFILTRATION; PERINEURAL at 12:57

## 2019-10-04 RX ADMIN — PROPOFOL 50 MG: 10 INJECTION, EMULSION INTRAVENOUS at 13:12

## 2019-10-04 RX ADMIN — PROPOFOL 50 MG: 10 INJECTION, EMULSION INTRAVENOUS at 13:00

## 2019-10-04 NOTE — OP NOTE
OPERATIVE REPORT  PATIENT NAME: Rand Michael    :  1960  MRN: [id number]  Pt Location: [facility] OR ROOM 04    SURGERY DATE: 10/4/2019    Surgeon(s) and Role:     * Esthela Tavera MD - Primary    Preop Diagnosis:  AIN III (anal intraepithelial neoplasia III) [D01 3]    Post-Op Diagnosis Codes:     * AIN III (anal intraepithelial neoplasia III) [D01 3]    Procedure(s) (LRB):  EXAM UNDER ANESTHESIA (EUA) (N/A)  ANOSCOPY HIGH RESOLUTION (N/A)    Specimen(s):  ID Type Source Tests Collected by Time Destination   1 : Right lateral perianal  Tissue Soft Tissue, Other TISSUE EXAM Esthela Tavera MD 10/4/2019 1341    2 : Left lateral perianal  Tissue Soft Tissue, Other TISSUE EXAM Esthela Tavera MD 10/4/2019 1341    3 : Posterior midline anal merge Tissue Soft Tissue, Other TISSUE EXAM Esthela Tavera MD 10/4/2019 1321    5 : Anterior midline perineum Tissue Soft Tissue, Other TISSUE EXAM Esthela Tavera MD 10/4/2019 1322    6 : Anterior midline anal canal Tissue Soft Tissue, Other TISSUE EXAM Esthela Tavera MD 10/4/2019 1323        Estimated Blood Loss:   Minimal    Drains:  * No LDAs found *    Anesthesia Type:   IV Sedation with Anesthesia    Operative Indications:  AIN III (anal intraepithelial neoplasia III) [D01 3]    Operative Findings:  -Tiny miliary disease scattered perianal, few perineum, anal verge, anal canal plaques, all biopsied/fulgurated similarly  Complications:   None    Procedure and Technique:  28-year-old female with findings of previous AIN III  We reviewed the original risks of procedure including pain, bleeding, damage to the sphincter complex and incontinence  She was brought to the operating room where sedation induced without event  Venodyne's were on and running throughout the case  She was placed in the prone jackknife position with attention to all extremities and bony prominences  The buttocks were taped apart and betadine prep      After timeout was taken per protocol, examination under anesthesia revealed normal perianal skin, normal digital exam,and anoscopy  A 5% acetic acid soaked gauze was placed in the anal canal and perianal skin and perineum for a full 5 minutes count for acetowhite staining and with visual magnification and accessory lights, the area was reinspected and showed tiny miliary disease scattered perianal, few perineum, anal verge, anal canal plaques, all biopsied/fulgurated similarly  The remaining specimens were also biopsied with Metzenbaum scissors and sent off as labeled specimens prior to ablating all remaining disease which was miliary in nature, superficially with needlepoint electrocautery  There was no deep dissection or dissection toward any area of the sphincter complex  Bacitracin was placed after hemostasis was assured and 4 x 4 and mesh underwear were placed  Please note that the area was under a pudendal and regional block with lidocaine/bupivacaine  All sponge, needle, instrument/RF Wand counts were correct and patient was rolled supine, extubated and taken to the recovery room in stable condition       I was present for the entire procedure    Patient Disposition:  PACU     SIGNATURE: Hector Hodge MD  DATE: October 4, 2019  TIME: 1:48 PM

## 2019-10-04 NOTE — ANESTHESIA PREPROCEDURE EVALUATION
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      Negative  ROS        Endo/Other  Negative endo/other ROS   Obesity (BMI 32)    GYN  Negative gynecology ROS   Hysterectomy,        Hematology  Negative hematology ROS      Musculoskeletal  Negative musculoskeletal ROS   Arthritis     Neurology  Negative neurology ROS      Psychology   Negative psychology ROS Anxiety, Depression ,              Physical Exam    Airway    Mallampati score: II  TM Distance: >3 FB  Neck ROM: full     Dental       Cardiovascular  Comment: Negative ROS,     Pulmonary      Other Findings        Anesthesia Plan  ASA Score- 2     Anesthesia Type- IV sedation with anesthesia with ASA Monitors  Additional Monitors:   Airway Plan:     Comment: Prone  Plan Factors-    Induction- intravenous  Postoperative Plan-     Informed Consent- Anesthetic plan and risks discussed with patient  I personally reviewed this patient with the CRNA  Discussed and agreed on the Anesthesia Plan with the CRNA  Hui Douglas

## 2019-10-04 NOTE — DISCHARGE INSTRUCTIONS
May shower/tub bathe this evening  There will be some bleeding/drainage, normal , may use dry gauze    Followup in office 3months Dr Lisa Borden  Tylenol or Advil as directed on label, as needed for pain  High fiber diet with metamucil or konsyl 1 tbsp 1-2x/day, increased hydration noncaffeinated beverages  May use Miralax as needed if no bowel movements in next 24-48hours  Call if any concerns [phone]

## 2019-10-04 NOTE — H&P
History and Physical   Colon and Rectal Surgery   Jessica Marrufo 66 y o  female MRN: [id number]  Unit/Bed#: OR POOL Encounter: [id number]  10/04/19   12:48 PM      No chief complaint on file  History of Present Illness   HPI:  Jessica Marrufo is a 66 y o  female who presents for high resolution anoscopy, previous AIN III, surveillance followup  Historical Information   Past Medical History:   Diagnosis Date    Arthritis     DDD    Costochondritis     last assessed: 4/4/2016    HPV (human papilloma virus) infection 12/2017    found on colonoscopy    ADRIANA (obstructive sleep apnea)     cpap level 10-15    Stress     Wears glasses      Past Surgical History:   Procedure Laterality Date    BILATERAL OOPHORECTOMY      COLONOSCOPY      COLONOSCOPY N/A 12/27/2017    Procedure: COLONOSCOPY;  Surgeon: Farzana Stratton MD;  Location: [facility]; Service: Gastroenterology    HYSTERECTOMY      partial    LIPOMA RESECTION      back    LIPOSUCTION      lipoma     DIAGNOSTIC ANOSCOPY & BIOPSY N/A 8/30/2018    Procedure: ANOSCOPY HIGH RESOLUTION;  Surgeon: Niko Orellana MD;  [facility];  Service: Colorectal     DIAGNOSTIC ANOSCOPY & BIOPSY N/A 4/4/2019    Procedure: ANOSCOPY HIGH RESOLUTION;  Surgeon: Niko Orellana MD;  [facility];  Service: Colorectal    CO DESTRUCTION,ANAL LESION(S),EXTENSIVE N/A 4/12/2018    Procedure: EXCISION CONDYLOMA ANAL/RECTAL;  Surgeon: Niko Orellana MD;  [facility];  Service: Colorectal    CO DESTRUCTION,ANAL LESION(S),EXTENSIVE N/A 8/30/2018    Procedure: EXCISION / FULGURATION OF LESIONS;  Surgeon: Niko Orellana MD;  [facility];  Service: Colorectal    CO SURG DIAGNOSTIC EXAM, ANORECTAL N/A 4/12/2018    Procedure: EXAM UNDER ANESTHESIA (EUA);   Surgeon: Niko Orellana MD;  [facility];  Service: Colorectal    CO SURG DIAGNOSTIC EXAM, ANORECTAL N/A 8/30/2018    Procedure: EXAM UNDER ANESTHESIA (EUA); Surgeon: Kelly Barron MD;  [facility];  Service: Colorectal    ID SURG DIAGNOSTIC EXAM, ANORECTAL N/A 2019    Procedure: EXAM UNDER ANESTHESIA (EUA);   Surgeon: Kelly Barron MD;  [facility];  Service: Colorectal    TONSILLECTOMY      WISDOM TOOTH EXTRACTION      x4       Meds/Allergies     Medications Prior to Admission   Medication    B Complex Vitamins (B COMPLEX 1 PO)    BIOTIN PO    Calcium Citrate-Vitamin D (CALCIUM CITRATE + D PO)    EVENING PRIMROSE OIL PO    FLUoxetine (PROzac) 40 MG capsule    Ginkgo Biloba (GINKOBA PO)    GLUTATHIONE PO    multivitamin (THERAGRAN) TABS    Probiotic Product (PROBIOTIC-10 PO)         Current Facility-Administered Medications:     lactated ringers infusion, 125 mL/hr, Intravenous, Continuous, Laz Coreas MD    Allergies   Allergen Reactions    Sulfa Antibiotics Rash         Social History   Social History     Substance and Sexual Activity   Alcohol Use Yes    Frequency: 2-4 times a month    Drinks per session: 1 or 2    Binge frequency: Never    Comment: socially     Social History     Substance and Sexual Activity   Drug Use No     Social History     Tobacco Use   Smoking Status Former Smoker    Packs/day: 0 25    Years: 10 00    Pack years: 2 50    Last attempt to quit: 2018    Years since quittin 2   Smokeless Tobacco Never Used   Tobacco Comment    quit 2018         Family History:   Family History   Problem Relation Age of Onset    Other Mother         damage to diaphragm s/p surgery    Rheum arthritis Mother     Stroke Mother 61        mini strokes    Diabetes type II Mother 61   Joellennia Arnold Bipolar disorder Mother     Depression Mother     Anxiety disorder Mother     Breast cancer Sister 36    Depression Sister     Anxiety disorder Sister     Hypertension Paternal Grandmother 79    Addiction problem Son     Bipolar disorder Son     Depression Son     Anxiety disorder Son    Joellen Arnold Alcohol abuse Son          Objective     Current Vitals:   Blood Pressure: 124/73 (10/04/19 1155)  Pulse: 61 (10/04/19 1155)  Temperature: (!) 97 4 °F (36 3 °C) (10/04/19 1155)  Temp Source: Temporal (10/04/19 1155)  Respirations: 18 (10/04/19 1155)  Height: 5' 4" (162 6 cm) (09/26/19 1425)  Weight - Scale: 106 kg (233 lb 8 oz) (10/04/19 1142)  SpO2: 99 % (10/04/19 1155)  No intake or output data in the 24 hours ending 10/04/19 1248    Physical Exam:  General:no distress  Eyes:perrla/eomi  ENT:moist mucus membranes  Neck:supple  Pulm:no increased work of breathing, clear bilateral  CV:sinus          ASSESSMENT:  Previous AIN III  Exam under anesthesia, high resolution anoscopy, anorectal surgery and in a face to face, personal informed consent the alternatives,benefits risks including not limited to bleeding, infection, risks of anesthesia, damage to sphincter/incontinence,staged surgery  They understood these risks, signed informed consent, and wish to proceed        PLAN:  Exam under anesthesia, high resolution anoscopy,

## 2019-10-06 LAB
ALBUMIN SERPL-MCNC: 4.2 G/DL (ref 3.5–5.5)
ALBUMIN/GLOB SERPL: 1.8 {RATIO} (ref 1.2–2.2)
ALP SERPL-CCNC: 63 IU/L (ref 39–117)
ALT SERPL-CCNC: 12 IU/L (ref 0–32)
AST SERPL-CCNC: 16 IU/L (ref 0–40)
BILIRUB SERPL-MCNC: 0.3 MG/DL (ref 0–1.2)
BUN SERPL-MCNC: 13 MG/DL (ref 6–24)
BUN/CREAT SERPL: 16 (ref 9–23)
CALCIUM SERPL-MCNC: 9.7 MG/DL (ref 8.7–10.2)
CHLORIDE SERPL-SCNC: 100 MMOL/L (ref 96–106)
CHOLEST SERPL-MCNC: 205 MG/DL (ref 100–199)
CO2 SERPL-SCNC: 24 MMOL/L (ref 20–29)
CREAT SERPL-MCNC: 0.81 MG/DL (ref 0.57–1)
GLOBULIN SER-MCNC: 2.4 G/DL (ref 1.5–4.5)
GLUCOSE SERPL-MCNC: 101 MG/DL (ref 65–99)
HDLC SERPL-MCNC: 58 MG/DL
LDLC SERPL CALC-MCNC: 121 MG/DL (ref 0–99)
MICRODELETION SYND BLD/T FISH: NORMAL
POTASSIUM SERPL-SCNC: 4.8 MMOL/L (ref 3.5–5.2)
PROT SERPL-MCNC: 6.6 G/DL (ref 6–8.5)
SL AMB EGFR AFRICAN AMERICAN: 94 ML/MIN/1.73
SL AMB EGFR NON AFRICAN AMERICAN: 81 ML/MIN/1.73
SODIUM SERPL-SCNC: 140 MMOL/L (ref 134–144)
TRIGL SERPL-MCNC: 132 MG/DL (ref 0–149)

## 2019-10-17 ENCOUNTER — OFFICE VISIT (OUTPATIENT)
Dept: FAMILY MEDICINE CLINIC | Facility: CLINIC | Age: 59
End: 2019-10-17
Payer: COMMERCIAL

## 2019-10-17 VITALS
DIASTOLIC BLOOD PRESSURE: 80 MMHG | HEART RATE: 73 BPM | WEIGHT: 237 LBS | HEIGHT: 64 IN | RESPIRATION RATE: 16 BRPM | BODY MASS INDEX: 40.46 KG/M2 | TEMPERATURE: 96.3 F | SYSTOLIC BLOOD PRESSURE: 128 MMHG | OXYGEN SATURATION: 97 %

## 2019-10-17 DIAGNOSIS — E78.2 MIXED HYPERLIPIDEMIA: Primary | ICD-10-CM

## 2019-10-17 DIAGNOSIS — F32.A ANXIETY AND DEPRESSION: ICD-10-CM

## 2019-10-17 DIAGNOSIS — F41.9 ANXIETY AND DEPRESSION: ICD-10-CM

## 2019-10-17 DIAGNOSIS — Z23 NEED FOR VACCINATION: ICD-10-CM

## 2019-10-17 DIAGNOSIS — E66.01 CLASS 3 SEVERE OBESITY DUE TO EXCESS CALORIES WITHOUT SERIOUS COMORBIDITY WITH BODY MASS INDEX (BMI) OF 40.0 TO 44.9 IN ADULT (HCC): ICD-10-CM

## 2019-10-17 PROCEDURE — 90471 IMMUNIZATION ADMIN: CPT

## 2019-10-17 PROCEDURE — 90682 RIV4 VACC RECOMBINANT DNA IM: CPT

## 2019-10-17 PROCEDURE — 99214 OFFICE O/P EST MOD 30 MIN: CPT | Performed by: FAMILY MEDICINE

## 2019-10-17 PROCEDURE — 3008F BODY MASS INDEX DOCD: CPT | Performed by: FAMILY MEDICINE

## 2019-10-17 RX ORDER — ROSUVASTATIN CALCIUM 5 MG/1
5 TABLET, COATED ORAL DAILY
Qty: 90 TABLET | Refills: 3 | Status: SHIPPED | OUTPATIENT
Start: 2019-10-17 | End: 2020-06-29 | Stop reason: SDUPTHER

## 2019-10-17 NOTE — PROGRESS NOTES
Assessment/Plan:    1  Mixed hyperlipidemia  -     rosuvastatin (CRESTOR) 5 mg tablet; Take 1 tablet (5 mg total) by mouth daily  -     Comprehensive metabolic panel; Future  -     Lipid Panel with Direct LDL reflex; Future    2  Need for vaccination  -     influenza vaccine, 0426-0914, quadrivalent, recombinant, PF, 0 5 mL, for patients 18 yr+ (FLUBLOK)    3  Class 3 severe obesity due to excess calories without serious comorbidity with body mass index (BMI) of 40 0 to 44 9 in [facility])  -     Ambulatory referral to Weight Management; Future    4  BMI 40 0-44 9, [facility])  -     Ambulatory referral to Weight Management; Future    5  Anxiety and depression          BMI Counseling: Body mass index is 40 68 kg/m²  Discussed the patient's BMI with her  The BMI is above normal  Nutrition recommendations include reducing portion sizes  There are no Patient Instructions on file for this visit  Return in about 3 months (around 1/17/2020)  Subjective:      Patient ID: Gustavo Faria is a 64 y o  female  Chief Complaint   Patient presents with    Follow-up     Shelley Sutton       Pt is here for a 6 month follow up  PT states just had a call from [facility] and was told her tetsing was benign and that she does not need to be seen for 6 months  No CP, no SOB      The following portions of the patient's history were reviewed and updated as appropriate: allergies, current medications, past family history, past medical history, past social history, past surgical history and problem list     Review of Systems   Constitutional: Negative  Negative for activity change, appetite change, chills, diaphoresis and fatigue  HENT: Negative  Negative for dental problem, ear pain, sinus pressure and sore throat  Eyes: Negative  Negative for photophobia, pain, discharge, redness, itching and visual disturbance  Respiratory: Negative for apnea and chest tightness  Cardiovascular: Negative    Negative for chest pain, palpitations and leg swelling  Gastrointestinal: Negative  Negative for abdominal distention, abdominal pain, constipation and diarrhea  Endocrine: Negative  Negative for cold intolerance and heat intolerance  Genitourinary: Negative  Negative for difficulty urinating and dyspareunia  Musculoskeletal: Negative  Negative for arthralgias and back pain  Skin: Negative  Allergic/Immunologic: Negative for environmental allergies  Neurological: Negative  Negative for dizziness  Psychiatric/Behavioral: Negative  Negative for agitation  Current Outpatient Medications   Medication Sig Dispense Refill    B Complex Vitamins (B COMPLEX 1 PO) Take 1 capsule by mouth daily      BIOTIN PO Take by mouth every morning      Calcium Citrate-Vitamin D (CALCIUM CITRATE + D PO) Take by mouth every morning      EVENING PRIMROSE OIL PO Take by mouth every morning      FLUoxetine (PROzac) 40 MG capsule Take 40 mg by mouth daily in the early morning       Ginkgo Biloba (GINKOBA PO) Take by mouth every morning      GLUTATHIONE PO Take by mouth every morning      multivitamin (THERAGRAN) TABS Take 1 tablet by mouth every morning      Probiotic Product (PROBIOTIC-10 PO) Take 1 capsule by mouth daily      rosuvastatin (CRESTOR) 5 mg tablet Take 1 tablet (5 mg total) by mouth daily 90 tablet 3     No current facility-administered medications for this visit  Objective:    /80   Pulse 73   Temp (!) 96 3 °F (35 7 °C)   Resp 16   Ht 5' 4" (1 626 m)   Wt 108 kg (237 lb)   SpO2 97%   BMI 40 68 kg/m²        Physical Exam   Constitutional: She appears well-developed and well-nourished  No distress  HENT:   Head: Normocephalic and atraumatic  Right Ear: External ear normal    Left Ear: External ear normal    Nose: Nose normal    Mouth/Throat: Oropharynx is clear and moist  No oropharyngeal exudate  Eyes: Pupils are equal, round, and reactive to light   EOM are normal  Right eye exhibits no discharge  Left eye exhibits no discharge  No scleral icterus  Neck: No thyromegaly present  Cardiovascular: Normal rate and normal heart sounds  No murmur heard  Pulmonary/Chest: Effort normal and breath sounds normal  No respiratory distress  She has no wheezes  Abdominal: Soft  Bowel sounds are normal  She exhibits no distension and no mass  There is no tenderness  There is no rebound and no guarding  Musculoskeletal: Normal range of motion  Neurological: She is alert  She displays normal reflexes  Coordination normal    Skin: Skin is warm and dry  No rash noted  She is not diaphoretic  No erythema  Psychiatric: She has a normal mood and affect  Her behavior is normal    Nursing note and vitals reviewed          Recent Results (from the past 672 hour(s))   Tissue Exam    Collection Time: 10/04/19  1:21 PM   Result Value Ref Range    Case Report       Surgical Pathology Report                         Case: [id number]                                   Authorizing Provider:  Rodney Wing MD      Collected:           10/04/2019 1321              Ordering Location:     [facility]        Received:            10/04/2019 2026                                     [address] [place] [address]                                                      Pathologist:           Kirt Akers MD                                                                Specimens:   A) - Skin, Other, Right lateral perianal                                                            B) - Skin, Other, Left lateral perianal                                                             C) - Soft Tissue, Other, Posterior midline anal verge                                               D) - Perineum, Anterior midline perineum                                                             E) - Soft Tissue, Other, Anterior midline anal canal Final Diagnosis       A  Right lateral perianal (biopsy):     - Squamous mucosa with suggestion of HPV effect      - No high-grade dysplasia or malignancy identified  B  Left lateral perianal (biopsy):     - Squamous mucosa with suggestion of HPV effect      - No high-grade dysplasia or malignancy identified  C  Posterior midline anal verge (biopsy):     - Squamous mucosa with suggestion of HPV effect      - No high-grade dysplasia or malignancy identified  D  Anterior midline perineum (biopsy):     - Squamous mucosa with suggestion of HPV effect      - No high-grade dysplasia or malignancy identified  E  Anterior midline anal canal (biopsy):     - Squamous mucosa with suggestion of HPV effect      - No high-grade dysplasia or malignancy identified  Preliminary Diagnosis       A  Right lateral perianal (biopsy):     - Squamous mucosa with suggestion of HPV effect  Additional studies pending  B  Left lateral perianal (biopsy):     - Squamous mucosa with suggestion of HPV effect  Additional studies pending  C  Posterior midline anal verge (biopsy):     - Squamous mucosa with suggestion of HPV effect  Additional studies pending  D  Anterior midline perineum (biopsy):     - Squamous mucosa with suggestion of HPV effect  Additional studies pending  E  Anterior midline anal canal (biopsy):     - Squamous mucosa with suggestion of HPV effect  Additional studies pending  Microscopic Description       - Immunohistochemical studies (with appropriate controls) demonstrate:     - Parts A-E: Multiplex P16 / Ki-67 with minimal P16 and focal mild Ki-67  Note       Interpretation performed at [facility], [address] [place] [id number]  Additional Information       All controls performed with the immunohistochemical stains reported above reacted appropriately    These tests were developed and their performance characteristics determined by [facility]  [facility] [address] [facility] or [facility]  They may not be cleared or approved by the [place]  Food and Drug Administration  The FDA has determined that such clearance or approval is not necessary  These tests are used for clinical purposes  They should not be regarded as investigational or for research  This laboratory has been approved by CLIA 88, designated as a high-complexity laboratory and is qualified to perform these tests  Gross Description       A  The specimen is received in formalin, labeled with the patient's name and hospital number, and is designated "right lateral perianal  The specimen consists of 4 unoriented tan fragments of skin and soft tissue each measuring 0 3-0 4 cm  Entirely submitted  One cassette  In an embedding bag     B  The specimen is received in formalin, labeled with the patient's name and hospital number, and is designated "left lateral perianal  The specimen consists of 3 unoriented tan fragments of skin and soft tissue each measuring 0 3-0 5 cm in greatest dimension  Entirely submitted  One cassette  In an embedding bag     C  The specimen is received in formalin, labeled with the patient's name and hospital number, and is designated "posterior midline anal verge  The specimen consists of a single white tan fragment of skin versus soft tissue measuring 0 4 cm in greatest dimension  Entirely submitted  One cassette  In an embedding bag     D  The specimen is received in formalin, labeled with the patient's name and hospital number, and is designated "anterior midline perineum  The specimen consists of a single unoriented tan fragment of skin and soft tissue measuring 0 5 cm in greatest dimension  Entirely submitted  One cassette  In an embedding bag     E  The specimen is received in formalin, labeled with the patient's name and hospital number, and is designated "anterior midline anal canal    The specimen consists of an unoriented tan fragment of skin and soft tissue measuring 0 5 cm in greatest dimension  Entirely submitted  One cassette  In an embedding bag  Note: The estimated total formalin fixation time based upon information provided by the submitting clinician and the standard processing schedule is under 72 hours      MCrites                 Clinical Information AIN III (anal intraepithelial neoplasia III)    Comprehensive metabolic panel    Collection Time: 10/05/19  8:45 AM   Result Value Ref Range    Glucose, Random 101 (H) 65 - 99 mg/dL    BUN 13 6 - 24 mg/dL    Creatinine 0 81 0 57 - 1 00 mg/dL    eGFR Non  81 >59 mL/min/1 73    eGFR  94 >59 mL/min/1 73    SL AMB BUN/CREATININE RATIO 16 9 - 23    Sodium 140 134 - 144 mmol/L    Potassium 4 8 3 5 - 5 2 mmol/L    Chloride 100 96 - 106 mmol/L    CO2 24 20 - 29 mmol/L    CALCIUM 9 7 8 7 - 10 2 mg/dL    Protein, Total 6 6 6 0 - 8 5 g/dL    Albumin 4 2 3 5 - 5 5 g/dL    Globulin, Total 2 4 1 5 - 4 5 g/dL    Albumin/Globulin Ratio 1 8 1 2 - 2 2    TOTAL BILIRUBIN 0 3 0 0 - 1 2 mg/dL    Alk Phos Isoenzymes 63 39 - 117 IU/L    AST 16 0 - 40 IU/L    ALT 12 0 - 32 IU/L   Lipid panel    Collection Time: 10/05/19  8:45 AM   Result Value Ref Range    Cholesterol, Total 205 (H) 100 - 199 mg/dL    Triglycerides 132 0 - 149 mg/dL    HDL 58 >39 mg/dL    LDL Direct 121 (H) 0 - 99 mg/dL   Cardiovascular Report    Collection Time: 10/05/19  8:45 AM   Result Value Ref Range    Interpretation Note         The 10-year ASCVD risk score (Jerzy Sharma et al , 2013) is: 2 4%    Values used to calculate the score:      Age: 64 years      Sex: Female      Is Non- : No      Diabetic: No      Tobacco smoker: No      Systolic Blood Pressure: 189 mmHg      Is BP treated: No      HDL Cholesterol: 58 mg/dL      Total Cholesterol: 205 mg/dL      Robert Todd DO

## 2019-10-17 NOTE — LETTER
October 17, 2019     Patient: Hyun Aragon   YOB: 1960   Date of Visit: 10/17/2019       To Whom it May Concern:    Hyun Aragon is under my professional care  She was seen in my office on 10/17/2019  She would benefit from performing yoga    If you have any questions or concerns, please don't hesitate to call           Sincerely,          Serafin Carreon DO        CC: No Recipients

## 2019-10-28 ENCOUNTER — TELEPHONE (OUTPATIENT)
Dept: FAMILY MEDICINE CLINIC | Facility: CLINIC | Age: 59
End: 2019-10-28

## 2019-10-28 NOTE — TELEPHONE ENCOUNTER
Patient is going to bariatric dr on Wednesday and needs a letter from Dr Jayjay Prabhakar stating that she has tried to loose weight for 6 months with no results  She would benefit from bariatric surgery      Please call patient when letter is ready for     Thank you

## 2019-10-30 ENCOUNTER — CLINICAL SUPPORT (OUTPATIENT)
Dept: BARIATRICS | Facility: CLINIC | Age: 59
End: 2019-10-30

## 2019-10-30 VITALS
WEIGHT: 242.4 LBS | HEIGHT: 64 IN | SYSTOLIC BLOOD PRESSURE: 138 MMHG | DIASTOLIC BLOOD PRESSURE: 88 MMHG | TEMPERATURE: 98.6 F | BODY MASS INDEX: 41.38 KG/M2 | RESPIRATION RATE: 14 BRPM | HEART RATE: 57 BPM

## 2019-10-30 DIAGNOSIS — Z98.84 BARIATRIC SURGERY STATUS: ICD-10-CM

## 2019-10-30 DIAGNOSIS — E66.01 MORBID OBESITY (HCC): Primary | ICD-10-CM

## 2019-10-30 DIAGNOSIS — E66.01 MORBID (SEVERE) OBESITY DUE TO EXCESS CALORIES (HCC): Primary | ICD-10-CM

## 2019-10-30 PROCEDURE — RECHECK
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
## 2019-10-30 NOTE — PROGRESS NOTES
Bariatric Nutrition Assessment Note  3 weight checks  Type of surgery    Gastric bypass: laparoscopic  Surgery Date: TBD  Surgeon: Dr Diamond [place]  64 y o   female     Wt with BMI of 25: 146 4lbs  Pre-Op Excess Wt: 96lbs  Blood pressure 138/88, pulse 57, temperature 98 6 °F (37 °C), temperature source Tympanic, resp  rate 14, height 5' 4" (1 626 m), weight 110 kg (242 lb 6 4 oz)  Weight History   Onset of Obesity: Childhood, always yo-yo  Never weighed less then 180# since fabiana high, quit smoking July of 2018 and regained wt  Family history of obesity: Yes  Mom and 3 sisters and 2 brother  Her 3 aunts had sx  Wt Loss Attempts: Commercial Programs ([employer], Lenell Staple, etc )  Exercise  High Protein/Low CHO diets (Atkins, Union, etc )  Self Created Diets (Portion Control, Healthy Food Choices, etc )  DASH  Meds-redux (early 80s)  Maximum Wt Lost: -65-70lbs with weight watcher    Review of History and Medications   Past Medical History:   Diagnosis Date    Anxiety     Arthritis     DDD    Bipolar 1 disorder ([address] )     Costochondritis     last assessed: 4/4/2016    Depression     HPV (human papilloma virus) infection 12/2017    found on colonoscopy    Kidney stone     ADRIANA (obstructive sleep apnea)     cpap level 10-15    Stress     Stress incontinence     Wears glasses      Past Surgical History:   Procedure Laterality Date    BILATERAL OOPHORECTOMY      COLONOSCOPY      COLONOSCOPY N/A 12/27/2017    Procedure: COLONOSCOPY;  Surgeon: Marla Soto MD;  Location: [place] [address] GI LAB;   Service: Gastroenterology    HYSTERECTOMY      partial    LIPOMA RESECTION      back    LIPOSUCTION      lipoma     DIAGNOSTIC ANOSCOPY & BIOPSY N/A 8/30/2018    Procedure: ANOSCOPY HIGH RESOLUTION;  Surgeon: Radha Hernandez MD;  Location:  MAIN OR;  Service: Colorectal     DIAGNOSTIC ANOSCOPY & BIOPSY N/A 4/4/2019    Procedure: ANOSCOPY HIGH RESOLUTION; Surgeon: Hector Hodge MD;  [facility];  Service: Colorectal     DIAGNOSTIC ANOSCOPY & BIOPSY N/A 10/4/2019    Procedure: ANOSCOPY HIGH RESOLUTION;  Surgeon: Hector Hodge MD;  [facility];  Service: Colorectal    KY DESTRUCTION,ANAL LESION(S),EXTENSIVE N/A 2018    Procedure: EXCISION CONDYLOMA ANAL/RECTAL;  Surgeon: Hector Hodge MD;  [facility];  Service: Colorectal    KY DESTRUCTION,ANAL LESION(S),EXTENSIVE N/A 2018    Procedure: EXCISION / FULGURATION OF LESIONS;  Surgeon: Hector Hodge MD;  [facility];  Service: Colorectal    KY SURG DIAGNOSTIC EXAM, ANORECTAL N/A 2018    Procedure: EXAM UNDER ANESTHESIA (EUA); Surgeon: Hector Hodge MD;  [facility];  Service: Colorectal    KY SURG DIAGNOSTIC EXAM, ANORECTAL N/A 2018    Procedure: EXAM UNDER ANESTHESIA (EUA); Surgeon: Hector Hodge MD;  [facility];  Service: Colorectal    KY SURG DIAGNOSTIC EXAM, ANORECTAL N/A 2019    Procedure: EXAM UNDER ANESTHESIA (EUA); Surgeon: Hector Hodge MD;  [facility];  Service: Colorectal    KY SURG DIAGNOSTIC EXAM, ANORECTAL N/A 10/4/2019    Procedure: EXAM UNDER ANESTHESIA (EUA);   Surgeon: Hector Hodge MD;  [facility];  Service: Colorectal    TONSILLECTOMY      WISDOM TOOTH EXTRACTION      x4     Social History     Socioeconomic History    Marital status: /Civil Union     Spouse name: None    Number of children: None    Years of education: None    Highest education level: None   Occupational History    None   Social Needs    Financial resource strain: None    Food insecurity:     Worry: None     Inability: None    Transportation needs:     Medical: None     Non-medical: None   Tobacco Use    Smoking status: Former Smoker     Packs/day: 0 25     Years: 10 00     Pack years: 2 50     Last attempt to quit: 2018     Years since quittin 3    Smokeless tobacco: Never Used    Tobacco comment: quit 7/8/2018   Substance and Sexual Activity    Alcohol use: Yes     Frequency: 2-4 times a month     Drinks per session: 1 or 2     Binge frequency: Never     Comment: socially    Drug use: No    Sexual activity: Yes   Lifestyle    Physical activity:     Days per week: None     Minutes per session: None    Stress: None   Relationships    Social connections:     Talks on phone: None     Gets together: None     Attends Yazidi service: None     Active member of club or organization: None     Attends meetings of clubs or organizations: None     Relationship status: None    Intimate partner violence:     Fear of current or ex partner: None     Emotionally abused: None     Physically abused: None     Forced sexual activity: None   Other Topics Concern    None   Social History Narrative    None       Current Outpatient Medications:     FLUoxetine (PROzac) 40 MG capsule, Take 40 mg by mouth daily in the early morning , Disp: , Rfl:     NON FORMULARY, Dotera Life Long Vitality   Regime natural supplements per pt , Disp: , Rfl:     rosuvastatin (CRESTOR) 5 mg tablet, Take 1 tablet (5 mg total) by mouth daily, Disp: 90 tablet, Rfl: 3    B Complex Vitamins (B COMPLEX 1 PO), Take 1 capsule by mouth daily, Disp: , Rfl:     BIOTIN PO, Take 1 tablet by mouth every morning , Disp: , Rfl:     Calcium Citrate-Vitamin D (CALCIUM CITRATE + D PO), Take by mouth every morning, Disp: , Rfl:     EVENING PRIMROSE OIL PO, Take by mouth every morning, Disp: , Rfl:     Ginkgo Biloba (GINKOBA PO), Take by mouth every morning, Disp: , Rfl:     GLUTATHIONE PO, Take by mouth every morning, Disp: , Rfl:     multivitamin (THERAGRAN) TABS, Take 1 tablet by mouth every morning, Disp: , Rfl:     Probiotic Product (PROBIOTIC-10 PO), Take 1 capsule by mouth daily, Disp: , Rfl:   Food Intake and Lifestyle Assessment   Food Intake Assessment completed via 24 hour recall  Wake: 5:15am  Work:  [employer], sedentary job  Breakfast: tea with stevia and ff half and half  Snack: -   Lunch: 1/2 turkey ham and cheese on whole grain bread, veggie sticks, grapes or fast food:  1/4 pounder with cheese and fries and diet coke (fast food 1-2 times per week)  Snack: -  Dinner: 7pm-pork stew with veggies and crackers with butter and applesauce  Snack: 1/2 sleeve marciano crackers  Beverage intake: water, sugar free beverages, diet soda, coffee/tea and alcohol  Protein supplement: none  Estimated protein intake per day: 50-60gm  Estimated fluid intake per day: 64oz+, 2-12oz cup tea with ff H&H and stevia, 1-2 alcohol per week  Meals eaten away from home: fast food 1-2 times per week, out on Friday night to restaurant ([employer] for burger, fries and beer), [place] for lunch on weekend from [facility]  Typical meal pattern: 2 meals per day and grazes snacks at night  Eating Behaviors: Consumption of high calorie/ high fat foods, Large portion sizes, Frequent snacking/ grazing and Mindless eating  Food allergies or intolerances: Allergies   Allergen Reactions    Sulfa Antibiotics Rash     Cultural or Buddhist considerations: none  Supplements:  Fish oil, tumeric, multi, and more but can't remember all the names  Physical Assessment  Physical Activity  Types of exercise: Yoga for the past month 4-5 days per week, wants to get back to walking  Current physical limitations: has a degenerative disc in back    Psychosocial Assessment   Support systems: spouse and children  Socioeconomic factors: none    Nutrition Diagnosis  Diagnosis: Overweight / Obesity (NC-3 3)  Related to: Physical inactivity and Excessive energy intake  As Evidenced by: BMI >25     Nutrition Prescription: Recommend the following diet  Regular  Interventions and Teaching   Discussed pre-op and post-op nutrition guidelines  Patient educated and handouts provided    Surgical changes to stomach / GI  Capacity of post-surgery stomach  Diet progression  Adequate hydration  Sugar and fat restriction to decrease "dumping syndrome"  Fat restriction to decrease steatorrhea  Expected weight loss  Weight loss plateaus/ possibility of weight regain  Exercise  Suggestions for pre-op diet  Nutrition considerations after surgery  Protein supplements  Meal planning and preparation  Appropriate carbohydrate, protein, and fat intake, and food/fluid choices to maximize safe weight loss, nutrient intake, and tolerance   Dietary and lifestyle changes  Possible problems with poor eating habits  Intuitive eating  Techniques for self monitoring and keeping daily food journal  Potential for food intolerance after surgery, and ways to deal with them including: lactose intolerance, nausea, reflux, vomiting, diarrhea, food intolerance, appetite changes, gas  Vitamin / Mineral supplementation of Multivitamin with minerals and Vitamin D 2000IU    Education provided to: patient    Barriers to learning: No barriers identified  Readiness to change: preparation    Prior research on procedure: books, internet and friends or family    Comprehension: verbalizes understanding     Expected Compliance: good  Recommendations  Pt is an appropriate candidate for surgery  Yes  Evaluation / Monitoring  Dietitian to Monitor:  Body weight Physical activity  Pre-surgery weight loss goals:  5% by day of surgery:  -12lbs (230 4lbs)  1/2 in order to submit to insurance:  -6lbs  (236 4lbs)  Can go to a BMI of 35 (204lbs) since has ADRIANA  Goals  Food journal via [employer]  Continue yoga 5 days per week, start walking 30 mins 2-3 days per week  Complete lession plans 1-6  Eat 3 meals per day with protein at each meal  (start the day with breakfast)  Eliminate mindless snacking in the evening  Time Spent:   1 Hour

## 2019-11-13 ENCOUNTER — CONSULT (OUTPATIENT)
Dept: BARIATRICS | Facility: CLINIC | Age: 59
End: 2019-11-13
Payer: COMMERCIAL

## 2019-11-13 VITALS
TEMPERATURE: 97.6 F | SYSTOLIC BLOOD PRESSURE: 118 MMHG | WEIGHT: 229.2 LBS | RESPIRATION RATE: 14 BRPM | DIASTOLIC BLOOD PRESSURE: 72 MMHG | HEIGHT: 64 IN | HEART RATE: 70 BPM | BODY MASS INDEX: 39.13 KG/M2

## 2019-11-13 DIAGNOSIS — R53.81 MALAISE AND FATIGUE: ICD-10-CM

## 2019-11-13 DIAGNOSIS — R53.83 MALAISE AND FATIGUE: ICD-10-CM

## 2019-11-13 DIAGNOSIS — M19.90 OSTEOARTHRITIS: ICD-10-CM

## 2019-11-13 DIAGNOSIS — E66.01 MORBID (SEVERE) OBESITY DUE TO EXCESS CALORIES (HCC): Primary | ICD-10-CM

## 2019-11-13 DIAGNOSIS — G47.33 OSA (OBSTRUCTIVE SLEEP APNEA): ICD-10-CM

## 2019-11-13 DIAGNOSIS — E78.2 MIXED HYPERLIPIDEMIA: ICD-10-CM

## 2019-11-13 DIAGNOSIS — Z01.818 ENCOUNTER FOR OTHER PREPROCEDURAL EXAMINATION: ICD-10-CM

## 2019-11-13 PROCEDURE — 99204 OFFICE O/P NEW MOD 45 MIN: CPT | Performed by: SURGERY

## 2019-11-13 NOTE — LETTER
November 13, 2019     Robert Todd DO  [address] [id number]    Patient: Kaila Lora   YOB: 1960   Date of Visit: 11/13/2019       Dear Dr Luis Plascencia:    Thank you for referring Kaila Lora to me for evaluation for metabolic and bariatric surgery  Below are my notes for this consultation  If you have questions, please do not hesitate to call me  I look forward to following your patient along with you  Sincerely,        Ashley Stern MD        CC: No Recipients  Ashley Stern MD  11/13/2019 10:27 AM  Sign at close encounter      BARIATRIC INITIAL CONSULT - [facility] 64 y o  female MRN: [id number]  Unit/Bed#:  Encounter: [id number]      HPI:  Kaila Lora is a 64 y o  female who presents with a longstanding history of morbid obesity and inability to sustain a meaningful weight loss  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  She desires to pursue metabolic and bariatric surgery because she wants to take charge of her life and health  She has tried several diets but always regains more than she has lost  She wants to increase her activity level  She denies history of DVT/PE  Son had a DVT when he was younger due to a sports related injury  Rarely takes NSAIDs  Quit smoking July 2018  Here today to discuss bariatric options  Visit type: initial visit    Symptoms: inability to loss weight    Associated Symptoms: poor self esteem    Associated Conditions: elevated LDL and sleep apnea  Disease Complications: sleep apnea, osteoarthritis and Mild stress incontinence  Weight Loss Interest: high    Exercise Frequency:daily  Types of Exercise: walking      Review of Systems   Constitutional: Positive for fatigue  Musculoskeletal: Positive for arthralgias and back pain  All other systems reviewed and are negative        Historical Information   Past Medical History:   Diagnosis Date    Anxiety     Arthritis     DDD    Bipolar 1 disorder ([facility] [address] )     Costochondritis     last assessed: 4/4/2016    Depression     HPV (human papilloma virus) infection 12/2017    found on colonoscopy    Kidney stone     ADRIANA (obstructive sleep apnea)     cpap level 10-15    Stress     Stress incontinence     Wears glasses      Past Surgical History:   Procedure Laterality Date    BILATERAL OOPHORECTOMY      COLONOSCOPY      COLONOSCOPY N/A 12/27/2017    Procedure: COLONOSCOPY;  Surgeon: Elvia Lozada MD;  Location: [place] [address] GI LAB; Service: Gastroenterology    HYSTERECTOMY      partial    LIPOMA RESECTION      back    LIPOSUCTION      lipoma     DIAGNOSTIC ANOSCOPY & BIOPSY N/A 8/30/2018    Procedure: ANOSCOPY HIGH RESOLUTION;  Surgeon: Hector Hodge MD;  [facility];  Service: Colorectal     DIAGNOSTIC ANOSCOPY & BIOPSY N/A 4/4/2019    Procedure: ANOSCOPY HIGH RESOLUTION;  Surgeon: Hector Hodge MD;  [facility];  Service: Colorectal     DIAGNOSTIC ANOSCOPY & BIOPSY N/A 10/4/2019    Procedure: ANOSCOPY HIGH RESOLUTION;  Surgeon: Hector Hodge MD;  [facility];  Service: Colorectal    IN DESTRUCTION,ANAL LESION(S),EXTENSIVE N/A 4/12/2018    Procedure: EXCISION CONDYLOMA ANAL/RECTAL;  Surgeon: Hector Hodge MD;  [facility];  Service: Colorectal    IN DESTRUCTION,ANAL LESION(S),EXTENSIVE N/A 8/30/2018    Procedure: EXCISION / FULGURATION OF LESIONS;  Surgeon: Hector Hodge MD;  [facility];  Service: Colorectal    IN SURG DIAGNOSTIC EXAM, ANORECTAL N/A 4/12/2018    Procedure: EXAM UNDER ANESTHESIA (EUA); Surgeon: Hector Hodge MD;  [facility];  Service: Colorectal    IN SURG DIAGNOSTIC EXAM, ANORECTAL N/A 8/30/2018    Procedure: EXAM UNDER ANESTHESIA (EUA); Surgeon: Hector Hodge MD;  [facility];  Service: Colorectal    IN SURG DIAGNOSTIC EXAM, ANORECTAL N/A 4/4/2019    Procedure: EXAM UNDER ANESTHESIA (EUA);   Surgeon: Hector Hodge MD; [facility];  Service: Colorectal    TX SURG DIAGNOSTIC EXAM, ANORECTAL N/A 10/4/2019    Procedure: EXAM UNDER ANESTHESIA (EUA); Surgeon: Kate Schwab MD;  [facility];  Service: Colorectal    TONSILLECTOMY      WISDOM TOOTH EXTRACTION      x4     Social History   Social History     Substance and Sexual Activity   Alcohol Use Yes    Alcohol/week: 2 0 standard drinks    Types: 1 Glasses of wine, 1 Cans of beer per week    Frequency: 2-4 times a month    Drinks per session: 1 or 2    Binge frequency: Never    Comment: socially     Social History     Substance and Sexual Activity   Drug Use No     Social History     Tobacco Use   Smoking Status Former Smoker    Packs/day: 0 25    Years: 10 00    Pack years: 2 50    Types: Cigarettes, Cigars    Start date: 2008    Last attempt to quit: 2018    Years since quittin 3   Smokeless Tobacco Never Used   Tobacco Comment    quit 2018     Family History: Son (DVT sports related injury)    Meds/Allergies   all medications and allergies reviewed  Allergies   Allergen Reactions    Sulfa Antibiotics Rash       Objective       Current Vitals:   /72 (BP Location: Left arm, Patient Position: Sitting, Cuff Size: Large)   Pulse 70   Temp 97 6 °F (36 4 °C) (Tympanic)   Resp 14   Ht 5' 4" (1 626 m)   Wt 104 kg (229 lb 3 2 oz)   BMI 39 34 kg/m²        Invasive Devices     None                 Physical Exam   Constitutional: She is oriented to person, place, and time  She appears well-developed and well-nourished  HENT:   Head: Atraumatic  Eyes: EOM are normal    Neck: Neck supple  Cardiovascular: Normal rate  Pulmonary/Chest: Effort normal    Abdominal: Soft  She exhibits no distension  There is no tenderness  Musculoskeletal: Normal range of motion  Neurological: She is alert and oriented to person, place, and time  Skin: Skin is warm and dry  Psychiatric: She has a normal mood and affect   Her behavior is normal    Vitals reviewed  Lab Results: I have personally reviewed pertinent lab results  Imaging: I have personally reviewed pertinent reports  EKG, Pathology, and Other Studies: I have personally reviewed pertinent reports  Assessment/PLAN:    64 y o  yo female with a long standing h/o of obesity and inability to sustain any meaningful weight loss on her own despite several attempts  She is interested in the Laparoscopic Cornelio-en-y gastric bypass  As a part of her pre op evaluation, she will be referred to a cardiologist and for a sleep evaluation and consult  She needs an EGD to evaluate the anatomy of her GI tract prior to the operation  I have spent over 45 minutes with her face to face in the office today discussing her options and details of the surgery  We have seen an animation of the surgery on the computer that illustrates how the operation is done and how the anatomy will be altered with the procedure  Over 50% of this was coordinating care  She was given the opportunity to ask questions and I have answered all of them  I have discussed and educated the patient with regards to the components of our multidisciplinary program and the importance of compliance and follow up in the post operative period  The patient was also instructed with regards to the importance of behavior modification, nutritional counseling, support meeting attendance and lifestyle changes that are important to ensure success  Although there is a great statistical chance of improvement or even resolution of most of her associated comorbidities, the results vary from patient to patient and they largely depend on her commitment and compliance  She needs to lose 12 lbs (230) prior to the operation      Shannon Santiago MD  11/13/2019  10:23 AM

## 2019-11-13 NOTE — PROGRESS NOTES
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  She desires to pursue metabolic and bariatric surgery because she wants to take charge of her life and health  She has tried several diets but always regains more than she has lost  She wants to increase her activity level  She denies history of DVT/PE  Son had a DVT when he was younger due to a sports related injury  Rarely takes NSAIDs  Quit smoking July 2018  Here today to discuss bariatric options  Visit type: initial visit    Symptoms: inability to loss weight    Associated Symptoms: poor self esteem    Associated Conditions: elevated LDL and sleep apnea  Disease Complications: sleep apnea, osteoarthritis and Mild stress incontinence  Weight Loss Interest: high    Exercise Frequency:daily  Types of Exercise: walking      Review of Systems   Constitutional: Positive for fatigue  Musculoskeletal: Positive for arthralgias and back pain  All other systems reviewed and are negative  Historical Information   Past Medical History:   Diagnosis Date    Anxiety     Arthritis     DDD    Bipolar 1 disorder ([facility] [address] )     Costochondritis     last assessed: 4/4/2016    Depression     HPV (human papilloma virus) infection 12/2017    found on colonoscopy    Kidney stone     ADRIANA (obstructive sleep apnea)     cpap level 10-15    Stress     Stress incontinence     Wears glasses      Past Surgical History:   Procedure Laterality Date    BILATERAL OOPHORECTOMY      COLONOSCOPY      COLONOSCOPY N/A 12/27/2017    Procedure: COLONOSCOPY;  Surgeon: Dmitry Nayak MD;  Location: [facility];   Service: Gastroenterology    HYSTERECTOMY      partial    LIPOMA RESECTION      back    LIPOSUCTION      lipoma   DIAGNOSTIC ANOSCOPY & BIOPSY N/A 8/30/2018    Procedure: ANOSCOPY HIGH RESOLUTION;  Surgeon: Linsey Lyman MD;  [facility];  Service: Colorectal     DIAGNOSTIC ANOSCOPY & BIOPSY N/A 4/4/2019    Procedure: ANOSCOPY HIGH RESOLUTION;  Surgeon: Linsey Lyman MD;  [facility];  Service: Colorectal     DIAGNOSTIC ANOSCOPY & BIOPSY N/A 10/4/2019    Procedure: ANOSCOPY HIGH RESOLUTION;  Surgeon: Linsey Lyman MD;  [facility];  Service: Colorectal    SC DESTRUCTION,ANAL LESION(S),EXTENSIVE N/A 4/12/2018    Procedure: EXCISION CONDYLOMA ANAL/RECTAL;  Surgeon: Linsey Lyman MD;  [facility];  Service: Colorectal    SC DESTRUCTION,ANAL LESION(S),EXTENSIVE N/A 8/30/2018    Procedure: EXCISION / FULGURATION OF LESIONS;  Surgeon: Linsey Lyman MD;  [facility];  Service: Colorectal    SC SURG DIAGNOSTIC EXAM, ANORECTAL N/A 4/12/2018    Procedure: EXAM UNDER ANESTHESIA (EUA); Surgeon: Linsey Lyman MD;  [facility];  Service: Colorectal    SC SURG DIAGNOSTIC EXAM, ANORECTAL N/A 8/30/2018    Procedure: EXAM UNDER ANESTHESIA (EUA); Surgeon: Linsey Lyman MD;  [facility];  Service: Colorectal    SC SURG DIAGNOSTIC EXAM, ANORECTAL N/A 4/4/2019    Procedure: EXAM UNDER ANESTHESIA (EUA); Surgeon: Linsey Lyman MD;  [facility];  Service: Colorectal    SC SURG DIAGNOSTIC EXAM, ANORECTAL N/A 10/4/2019    Procedure: EXAM UNDER ANESTHESIA (EUA);   Surgeon: Linsey Lyman MD;  [facility];  Service: Colorectal    TONSILLECTOMY      WISDOM TOOTH EXTRACTION      x4     Social History   Social History     Substance and Sexual Activity   Alcohol Use Yes    Alcohol/week: 2 0 standard drinks    Types: 1 Glasses of wine, 1 Cans of beer per week    Frequency: 2-4 times a month    Drinks per session: 1 or 2    Binge frequency: Never    Comment: socially     Social History Substance and Sexual Activity   Drug Use No     Social History     Tobacco Use   Smoking Status Former Smoker    Packs/day: 0 25    Years: 10 00    Pack years: 2 50    Types: Cigarettes, Cigars    Start date: 2008    Last attempt to quit: 2018    Years since quittin 3   Smokeless Tobacco Never Used   Tobacco Comment    quit 2018     Family History: Son (DVT sports related injury)    Meds/Allergies   all medications and allergies reviewed  Allergies   Allergen Reactions    Sulfa Antibiotics Rash       Objective       Current Vitals:   /72 (BP Location: Left arm, Patient Position: Sitting, Cuff Size: Large)   Pulse 70   Temp 97 6 °F (36 4 °C) (Tympanic)   Resp 14   Ht 5' 4" (1 626 m)   Wt 104 kg (229 lb 3 2 oz)   BMI 39 34 kg/m²       Invasive Devices     None                 Physical Exam   Constitutional: She is oriented to person, place, and time  She appears well-developed and well-nourished  HENT:   Head: Atraumatic  Eyes: EOM are normal    Neck: Neck supple  Cardiovascular: Normal rate  Pulmonary/Chest: Effort normal    Abdominal: Soft  She exhibits no distension  There is no tenderness  Musculoskeletal: Normal range of motion  Neurological: She is alert and oriented to person, place, and time  Skin: Skin is warm and dry  Psychiatric: She has a normal mood and affect  Her behavior is normal    Vitals reviewed  Lab Results: I have personally reviewed pertinent lab results  Imaging: I have personally reviewed pertinent reports  EKG, Pathology, and Other Studies: I have personally reviewed pertinent reports  Assessment/PLAN:    64 y o  yo female with a long standing h/o of obesity and inability to sustain any meaningful weight loss on her own despite several attempts  She is interested in the Laparoscopic Cornelio-en-y gastric bypass      As a part of her pre op evaluation, she will be referred to a cardiologist and for a sleep evaluation and consult  She needs an EGD to evaluate the anatomy of her GI tract prior to the operation  I have spent over 45 minutes with her face to face in the office today discussing her options and details of the surgery  We have seen an animation of the surgery on the computer that illustrates how the operation is done and how the anatomy will be altered with the procedure  Over 50% of this was coordinating care  She was given the opportunity to ask questions and I have answered all of them  I have discussed and educated the patient with regards to the components of our multidisciplinary program and the importance of compliance and follow up in the post operative period  The patient was also instructed with regards to the importance of behavior modification, nutritional counseling, support meeting attendance and lifestyle changes that are important to ensure success  Although there is a great statistical chance of improvement or even resolution of most of her associated comorbidities, the results vary from patient to patient and they largely depend on her commitment and compliance  She needs to lose 12 lbs (230) prior to the operation      Kemal Soriano MD  11/13/2019  10:23 AM

## 2019-11-14 ENCOUNTER — TELEPHONE (OUTPATIENT)
Dept: PREADMISSION TESTING | Facility: HOSPITAL | Age: 59
End: 2019-11-14

## 2019-11-21 ENCOUNTER — CONSULT (OUTPATIENT)
Dept: CARDIOLOGY CLINIC | Facility: CLINIC | Age: 59
End: 2019-11-21
Payer: COMMERCIAL

## 2019-11-21 VITALS
DIASTOLIC BLOOD PRESSURE: 60 MMHG | HEIGHT: 64 IN | BODY MASS INDEX: 38.67 KG/M2 | HEART RATE: 59 BPM | SYSTOLIC BLOOD PRESSURE: 110 MMHG | WEIGHT: 226.5 LBS | OXYGEN SATURATION: 94 %

## 2019-11-21 DIAGNOSIS — E66.01 MORBID OBESITY (HCC): ICD-10-CM

## 2019-11-21 DIAGNOSIS — E78.2 MIXED HYPERLIPIDEMIA: Primary | ICD-10-CM

## 2019-11-21 PROCEDURE — 99204 OFFICE O/P NEW MOD 45 MIN: CPT | Performed by: INTERNAL MEDICINE

## 2019-11-21 PROCEDURE — 93000 ELECTROCARDIOGRAM COMPLETE: CPT | Performed by: INTERNAL MEDICINE

## 2019-11-21 NOTE — PROGRESS NOTES
Subjective:     Genna Chairez is a 64 y o  female  who presents to the office today for a preoperative consultation at the request of surgeon Dr Abby Lyman who plans on performing Gastric bypass with surgical date TBD  Planned anesthesia is general  The patient has no known anesthesia issues  Most recent surgery was Anoscopy without complications  she is able to ambulate 4 blocks on level ground or 2 flights of stairs without stopping   (>4 METs)  She denies any chest pain or shortness of breath with exertion  The following portions of the patient's history were reviewed and updated as appropriate:   She  has a past medical history of Anxiety, Arthritis, Bipolar 1 disorder ([facility] [address] ), Costochondritis, Depression, HPV (human papilloma virus) infection (12/2017), Kidney stone, ADRIANA (obstructive sleep apnea), Stress, Stress incontinence, and Wears glasses  She  has a past surgical history that includes Liposuction; Hysterectomy; Bilateral oophorectomy; Tonsillectomy; [place] tooth extraction; Colonoscopy; Colonoscopy (N/A, 12/27/2017); Lipoma resection; pr surg diagnostic exam, anorectal (N/A, 4/12/2018); pr destruction,anal lesion(s),extensive (N/A, 4/12/2018); pr surg diagnostic exam, anorectal (N/A, 8/30/2018);  diagnostic anoscopy & biopsy (N/A, 8/30/2018); pr destruction,anal lesion(s),extensive (N/A, 8/30/2018); pr surg diagnostic exam, anorectal (N/A, 4/4/2019);  diagnostic anoscopy & biopsy (N/A, 4/4/2019); pr surg diagnostic exam, anorectal (N/A, 10/4/2019); and  diagnostic anoscopy & biopsy (N/A, 10/4/2019)  Her family history includes Addiction problem in her son; Alcohol abuse in her son; Anxiety disorder in her mother, sister, and son;  Arthritis in her mother; Bipolar disorder in her mother and son; Breast cancer (age of onset: 36) in her sister; Deep vein thrombosis in her son; Depression in her mother, sister, and son; Diabetes type II (age of onset: 61) in her mother; Drug abuse in her son; Hypertension (age of onset: 79) in her paternal grandmother; Mental illness in her sister; No Known Problems in her father; Other in her mother; Rheum arthritis in her mother; Stroke (age of onset: 61) in her mother; Substance Abuse in her son; Vision loss in her paternal grandmother  She  reports that she quit smoking about 16 months ago  Her smoking use included cigarettes and cigars  She started smoking about 11 years ago  She has a 2 50 pack-year smoking history  She has never used smokeless tobacco  She reports that she drinks about 2 0 standard drinks of alcohol per week  She reports that she does not use drugs  Current Outpatient Medications   Medication Sig Dispense Refill    B Complex Vitamins (B COMPLEX 1 PO) Take 1 capsule by mouth daily      BIOTIN PO Take 1 tablet by mouth every morning       Calcium Citrate-Vitamin D (CALCIUM CITRATE + D PO) Take by mouth every morning      EVENING PRIMROSE OIL PO Take by mouth every morning      FLUoxetine (PROzac) 40 MG capsule Take 40 mg by mouth daily in the early morning       Ginkgo Biloba (GINKOBA PO) Take by mouth every morning      GLUTATHIONE PO Take by mouth every morning      multivitamin (THERAGRAN) TABS Take 1 tablet by mouth every morning      NON FORMULARY [employer] Vit[employer]  [employer] natural supplements per pt   Probiotic Product (PROBIOTIC-10 PO) Take 1 capsule by mouth daily      rosuvastatin (CRESTOR) 5 mg tablet Take 1 tablet (5 mg total) by mouth daily 90 tablet 3     No current facility-administered medications for this visit  She is allergic to sulfa antibiotics       Review of Systems  Review of Systems   Constitutional: Negative for chills, fatigue and fever  HENT: Negative for congestion, nosebleeds and postnasal drip  Respiratory: Negative for cough, chest tightness and shortness of breath  Cardiovascular: Negative for chest pain, palpitations and leg swelling     Gastrointestinal: Negative for abdominal distention, abdominal pain, diarrhea, nausea and vomiting  Endocrine: Negative for polydipsia, polyphagia and polyuria  Musculoskeletal: Negative for gait problem and myalgias  Skin: Negative for color change, pallor and rash  Allergic/Immunologic: Negative for environmental allergies, food allergies and immunocompromised state  Neurological: Negative for dizziness, seizures, syncope and light-headedness  Hematological: Negative for adenopathy  Does not bruise/bleed easily  Psychiatric/Behavioral: Negative for dysphoric mood  The patient is not nervous/anxious  Objective:      Physical Exam  /60 (BP Location: Right arm, Patient Position: Sitting, Cuff Size: Large)   Pulse 59   Ht 5' 4" (1 626 m)   Wt 103 kg (226 lb 8 oz)   SpO2 94%   BMI 38 88 kg/m²    Physical Exam   Constitutional: She appears healthy  No distress  HENT:   Nose: Nose normal    Mouth/Throat: Dentition is normal  Oropharynx is clear  Eyes: Pupils are equal, round, and reactive to light  Conjunctivae are normal    Neck: Normal range of motion  Neck supple  No JVD present  Cardiovascular: Normal rate, regular rhythm and normal heart sounds  Exam reveals no gallop and no friction rub  No murmur heard  Pulmonary/Chest: Effort normal and breath sounds normal  She has no wheezes  She has no rales  Abdominal: Soft  She exhibits no distension  There is no tenderness  Musculoskeletal: She exhibits no edema  Neurological: She is alert and oriented to person, place, and time  Skin: Skin is warm and dry          Cardiographics  ECG: normal sinus rhythm, no blocks or conduction defects, no ischemic changes     Lab Review   Lab Results   Component Value Date     12/30/2017    K 4 8 10/05/2019     10/05/2019    CO2 24 10/05/2019    BUN 13 10/05/2019    CREATININE 0 81 10/05/2019    CREATININE 0 79 12/30/2017    GLUCOSE 103 (H) 12/30/2017    CALCIUM 9 2 12/30/2017     Lab Results   Component Value Date WBC 6 3 04/06/2019    HGB 13 7 04/06/2019    HCT 41 7 04/06/2019    MCV 91 04/06/2019     04/06/2019     Lab Results   Component Value Date    CHOL 173 12/30/2017    TRIG 132 10/05/2019    HDL 58 10/05/2019          Assessment:     1  Mixed hyperlipidemia    2  Morbid obesity ([facility] [address] )           60 y o  female  with planned surgery as above  Known risk factors for perioperative complications: None        Cardiac Risk Estimation: per the Revised Cardiac Risk Index, the patient has a score of 0 placing her at low risk of major cardiac event (3 9%),  and may proceed to OR as planned  No further cardiac workup is indicated at this time  Plan:      1  Preoperative workup as follows none  2  Change in medication regimen before surgery: none, continue medication regimen including morning of surgery, with sip of water  3  Prophylaxis for cardiac events with perioperative beta-blockers: not indicated  4  Invasive hemodynamic monitoring perioperatively: at the discretion of anesthesiologist   5  Deep vein thrombosis prophylaxis postoperatively:regimen to be chosen by surgical team   6  She should call if any new symptoms develop prior to surgical date

## 2019-12-16 ENCOUNTER — OFFICE VISIT (OUTPATIENT)
Dept: BARIATRICS | Facility: CLINIC | Age: 59
End: 2019-12-16

## 2019-12-16 VITALS — HEIGHT: 64 IN | BODY MASS INDEX: 38.48 KG/M2 | WEIGHT: 225.4 LBS

## 2019-12-16 DIAGNOSIS — Z98.84 BARIATRIC SURGERY STATUS: ICD-10-CM

## 2019-12-16 DIAGNOSIS — E66.9 OBESITY, CLASS II, BMI 35-39.9: Primary | ICD-10-CM

## 2019-12-16 PROCEDURE — RECHECK

## 2019-12-16 RX ORDER — AMOXICILLIN 500 MG
CAPSULE ORAL
COMMUNITY
End: 2020-02-12 | Stop reason: ALTCHOICE

## 2019-12-16 NOTE — PROGRESS NOTES
Bariatric Nutrition Assessment Note  3 weight checks (2 of 3 today)  Type of surgery    Gastric bypass: laparoscopic  Surgery Date: TBD  Surgeon: Dr Wilbert Scheuermann  64 y o   female     Wt with BMI of 25: 146 4lbs  Pre-Op Excess Wt: 96lbs  Height 5' 4" (1 626 m), weight 102 kg (225 lb 6 4 oz)  Body mass index is 38 69 kg/m²  Review of History and Medications   Past Medical History:   Diagnosis Date    Anxiety     Arthritis     DDD    Bipolar 1 disorder ([address] )     Costochondritis     last assessed: 4/4/2016    Depression     HPV (human papilloma virus) infection 12/2017    found on colonoscopy    Kidney stone     ADRIANA (obstructive sleep apnea)     cpap level 10-15    Stress     Stress incontinence     Wears glasses      Past Surgical History:   Procedure Laterality Date    BILATERAL OOPHORECTOMY      COLONOSCOPY      COLONOSCOPY N/A 12/27/2017    Procedure: COLONOSCOPY;  Surgeon: Chris El MD;  Location: [facility];   Service: Gastroenterology    HYSTERECTOMY      partial    LIPOMA RESECTION      back    LIPOSUCTION      lipoma     DIAGNOSTIC ANOSCOPY & BIOPSY N/A 8/30/2018    Procedure: ANOSCOPY HIGH RESOLUTION;  Surgeon: Fermin Calle MD;  [facility];  Service: Colorectal     DIAGNOSTIC ANOSCOPY & BIOPSY N/A 4/4/2019    Procedure: ANOSCOPY HIGH RESOLUTION;  Surgeon: Fermin Calle MD;  [facility];  Service: Colorectal     DIAGNOSTIC ANOSCOPY & BIOPSY N/A 10/4/2019    Procedure: ANOSCOPY HIGH RESOLUTION;  Surgeon: Fermin Calle MD;  [facility];  Service: Colorectal    NV DESTRUCTION,ANAL LESION(S),EXTENSIVE N/A 4/12/2018    Procedure: EXCISION CONDYLOMA ANAL/RECTAL;  Surgeon: Fermin Calle MD;  [facility];  Service: Colorectal    NV DESTRUCTION,ANAL LESION(S),EXTENSIVE N/A 8/30/2018    Procedure: EXCISION / FULGURATION OF LESIONS;  Surgeon: Fermin Calle MD;  [facility];  Service: Colorectal    NH SURG DIAGNOSTIC EXAM, ANORECTAL N/A 2018    Procedure: EXAM UNDER ANESTHESIA (EUA); Surgeon: Shree Paniagua MD;  [facility];  Service: Colorectal    NH SURG DIAGNOSTIC EXAM, ANORECTAL N/A 2018    Procedure: EXAM UNDER ANESTHESIA (EUA); Surgeon: Shree Paniagua MD;  [facility];  Service: Colorectal    NH SURG DIAGNOSTIC EXAM, ANORECTAL N/A 2019    Procedure: EXAM UNDER ANESTHESIA (EUA); Surgeon: Shree Paniagua MD;  [facility];  Service: Colorectal    NH SURG DIAGNOSTIC EXAM, ANORECTAL N/A 10/4/2019    Procedure: EXAM UNDER ANESTHESIA (EUA); Surgeon: Shree Paniagua MD;  [facility];  Service: Colorectal    TONSILLECTOMY      WISDOM TOOTH EXTRACTION      x4     Social History     Socioeconomic History    Marital status: /Civil Union     Spouse name: Not on file    Number of children: Not on file    Years of education: Not on file    Highest education level: Not on file   Occupational History    Not on file   Social Needs    Financial resource strain: Not on file    Food insecurity:     Worry: Not on file     Inability: Not on file    Transportation needs:     Medical: Not on file     Non-medical: Not on file   Tobacco Use    Smoking status: Former Smoker     Packs/day: 0 25     Years: 10 00     Pack years: 2 50     Types: Cigarettes, Cigars     Start date: 2008     Last attempt to quit: 2018     Years since quittin 4    Smokeless tobacco: Never Used    Tobacco comment: quit 2018   Substance and Sexual Activity    Alcohol use:  Yes     Alcohol/week: 2 0 standard drinks     Types: 1 Glasses of wine, 1 Cans of beer per week     Frequency: 2-4 times a month     Drinks per session: 1 or 2     Binge frequency: Never     Comment: socially    Drug use: No    Sexual activity: Yes     Partners: Male     Birth control/protection: Post-menopausal   Lifestyle    Physical activity: Days per week: Not on file     Minutes per session: Not on file    Stress: Not on file   Relationships    Social connections:     Talks on phone: Not on file     Gets together: Not on file     Attends Tenriism service: Not on file     Active member of club or organization: Not on file     Attends meetings of clubs or organizations: Not on file     Relationship status: Not on file    Intimate partner violence:     Fear of current or ex partner: Not on file     Emotionally abused: Not on file     Physically abused: Not on file     Forced sexual activity: Not on file   Other Topics Concern    Not on file   Social History Narrative    Not on file       Current Outpatient Medications:     B Complex Vitamins (B COMPLEX 1 PO), Take 1 capsule by mouth daily, Disp: , Rfl:     BIOTIN PO, Take 1 tablet by mouth every morning , Disp: , Rfl:     Calcium Citrate-Vitamin D (CALCIUM CITRATE + D PO), Take by mouth every morning, Disp: , Rfl:     EVENING PRIMROSE OIL PO, Take by mouth every morning, Disp: , Rfl:     FLUoxetine (PROzac) 40 MG capsule, Take 40 mg by mouth daily in the early morning , Disp: , Rfl:     Ginkgo Biloba (GINKOBA PO), Take by mouth every morning, Disp: , Rfl:     GLUTATHIONE PO, Take by mouth every morning, Disp: , Rfl:     multivitamin (THERAGRAN) TABS, Take 1 tablet by mouth every morning, Disp: , Rfl:     NON FORMULARY, Dotera Life Long Vitality   Regime natural supplements per pt , Disp: , Rfl:     Probiotic Product (PROBIOTIC-10 PO), Take 1 capsule by mouth daily, Disp: , Rfl:     rosuvastatin (CRESTOR) 5 mg tablet, Take 1 tablet (5 mg total) by mouth daily, Disp: 90 tablet, Rfl: 3  Food Intake and Lifestyle Assessment   Food Intake Assessment completed via 24 hour recall  Wake:  5:15am  Work:  Internet marketing, sedentary job  Breakfast: tea with stevia and ff half and half, has now most often been doing cereal with breakfast or mini quiche (eating breakfast at least 5 days per week)  Snack: -   Lunch: 1/2 turkey ham and cheese on whole grain bread, veggie sticks, grapes or sometimes [employer] 1/2 grilled cheese with veggie soup or veggie pasta with veggies and chicken or tuna with hard boiled egg, veggies and crackers  Snack: -  Dinner: 7pm-pork stew with veggies and fish, with starch and veggies or meat, starch and veggie  Snack: has greatly decreased  Beverage intake: water, sugar free beverages, diet soda, coffee/tea and alcohol  Protein supplement: sometimes will have a premier-advised can use as a meal replacement for one meal  Estimated protein intake per day: 50-60gm  Estimated fluid intake per day: 64oz+, 2-12oz cup tea with ff H&H and stevia, 1-2 alcohol per week  Meals eaten away from home: fast food 1-2 times per week, out on Friday night  Typical meal pattern: has been having breakfast at least 5 days per week instead of skipping and grazing a lot less  Eating Behaviors: Consumption of high calorie/ high fat foods, Large portion sizes, Frequent snacking/ grazing and Mindless eating  Food allergies or intolerances: Allergies   Allergen Reactions    Sulfa Antibiotics Rash     Cultural or Islam considerations: none  Supplements:  Fish oil, tumeric, multi, and more but can't remember all the names  Physical Assessment  Physical Activity  Types of exercise: Yoga for the past month 4-5 days per week, wants to get back to walking  Current physical limitations: has a degenerative disc in back    Psychosocial Assessment   Support systems: spouse and children  Socioeconomic factors: none    Nutrition Diagnosis  Diagnosis: Overweight / Obesity (NC-3 3)  Related to: Physical inactivity and Excessive energy intake  As Evidenced by: BMI >25     Nutrition Prescription: Recommend the following diet  Regular  Interventions and Teaching   Discussed pre-op and post-op nutrition guidelines  Patient educated and handouts provided    Surgical changes to stomach / GI  Capacity of post-surgery stomach  Adequate hydration  Expected weight loss  Weight loss plateaus/ possibility of weight regain  Exercise  Nutrition considerations after surgery  Protein supplements  Meal planning and preparation  Appropriate carbohydrate, protein, and fat intake, and food/fluid choices to maximize safe weight loss, nutrient intake, and tolerance   Dietary and lifestyle changes  Possible problems with poor eating habits  Intuitive eating  Techniques for self monitoring and keeping daily food journal  Education provided to: patient    Barriers to learning: No barriers identified  Readiness to change: preparation    Prior research on procedure: books, internet and friends or family    Comprehension: verbalizes understanding     Expected Compliance: good  Recommendations  Pt is an appropriate candidate for surgery  Yes  Evaluation / Monitoring  Dietitian to Monitor: Body weight Physical activity  3 months weight checks (2 of 3 dec)   PCP letter: referral in [facility] 10/17   Labs: plans on going 12/30   Sleep:  already uses CPAP   Cardio:  completed 11/21   Meet w/surgeon: 11/13og   EGD: scheduled for 12/20   Support Group:  11/7   Weight loss:  5% by day of surgery: -12lbs (230 4lbs)-achieved as of 12/16 1/2 in order to submit to insurance: -6lbs (236 4lbs)-achieved as of 11/13  Can go to a BMI of 35 (204lbs) since has ADRIANA     Pt has done amazing already losing 17lbs since her initial eval in October  She has met her pre-op weight loss goals already  She has been keeping a detailed food log that has helped her stay on track  On average she is taking in 6604-4078 calories, but there are some days when she does out to eat that is can reach 2500 calories  Advised pt to adhere to 1500 calories per day (150gm carbs, 58gm fat, 93gm protein)  She is still drinking 24oz of caffeinated beverages per day  Reminded pt that for the 1st month after surgery she needs to avoid all caffeine    Pt is aware she needs to start decreasing  Pt states she posted the paper about stress/emotional eating on her refrigerator which has been very helpful in reducing the grazing  Goals  Food journal via bariwilfredo  Continue yoga 5 days per week, start walking 30 mins 2-3 days per week  Complete lesson plans 1-6  Continue with eating breakfast everyday   Can use a protein shake as needed as a meal replacement  Work on decreasing caffeine  Continue to avoid the mindless/stress eating  Time Spent:   30mins

## 2019-12-17 ENCOUNTER — OFFICE VISIT (OUTPATIENT)
Dept: BARIATRICS | Facility: CLINIC | Age: 59
End: 2019-12-17

## 2019-12-17 VITALS — WEIGHT: 224.6 LBS | BODY MASS INDEX: 38.55 KG/M2

## 2019-12-17 DIAGNOSIS — E66.01 SEVERE OBESITY (BMI 35.0-39.9) WITH COMORBIDITY (HCC): Primary | ICD-10-CM

## 2019-12-17 PROCEDURE — RECHECK

## 2019-12-17 NOTE — PROGRESS NOTES
Patient met with RODERICK, reported that she is doing well in her goals of eating breakfast, portioning and increasing activity  She has been conscientious about food choices, if eating foods that are not as nutritionally dense she will have smaller portions and is still doing yoga and walking  She made Thanksgiving dinner and did well with her food choices, bringing fruit salad and comprising most of her plate of vegetables, and she plans to do the same for Hung  She is using measuring tools for portioning and has found the physical vs emotional eating handout hanging on her refrigerator has promoted more mindful eating  She has purchased a bariatric cookbook, was reminded of the [facility] & [address] page and will be working on following the 30/60 rule more consistently, setting timers and working up to the 60 mins  Patient will continue these efforts and return in January for follow up with RODERICK and CRYSTAL again

## 2019-12-18 DIAGNOSIS — Z12.31 ENCOUNTER FOR SCREENING MAMMOGRAM FOR MALIGNANT NEOPLASM OF BREAST: ICD-10-CM

## 2019-12-19 ENCOUNTER — ANESTHESIA EVENT (OUTPATIENT)
Dept: GASTROENTEROLOGY | Facility: AMBULARY SURGERY CENTER | Age: 59
End: 2019-12-19

## 2019-12-20 ENCOUNTER — HOSPITAL ENCOUNTER (OUTPATIENT)
Dept: GASTROENTEROLOGY | Facility: AMBULARY SURGERY CENTER | Age: 59
Setting detail: OUTPATIENT SURGERY
Discharge: HOME | End: 2019-12-20
Attending: SURGERY | Admitting: SURGERY
Payer: COMMERCIAL

## 2019-12-20 ENCOUNTER — ANESTHESIA (OUTPATIENT)
Dept: GASTROENTEROLOGY | Facility: AMBULARY SURGERY CENTER | Age: 59
End: 2019-12-20

## 2019-12-20 VITALS
HEART RATE: 53 BPM | RESPIRATION RATE: 16 BRPM | SYSTOLIC BLOOD PRESSURE: 101 MMHG | HEIGHT: 64 IN | WEIGHT: 221.5 LBS | BODY MASS INDEX: 37.81 KG/M2 | OXYGEN SATURATION: 95 % | DIASTOLIC BLOOD PRESSURE: 55 MMHG | TEMPERATURE: 98.1 F

## 2019-12-20 DIAGNOSIS — E66.01 MORBID OBESITY (HCC): ICD-10-CM

## 2019-12-20 PROCEDURE — 88305 TISSUE EXAM BY PATHOLOGIST: CPT | Performed by: PATHOLOGY

## 2019-12-20 PROCEDURE — 43239 EGD BIOPSY SINGLE/MULTIPLE: CPT | Performed by: SURGERY

## 2019-12-20 RX ORDER — SODIUM CHLORIDE, SODIUM LACTATE, POTASSIUM CHLORIDE, CALCIUM CHLORIDE 600; 310; 30; 20 MG/100ML; MG/100ML; MG/100ML; MG/100ML
INJECTION, SOLUTION INTRAVENOUS CONTINUOUS PRN
Status: DISCONTINUED | OUTPATIENT
Start: 2019-12-20 | End: 2019-12-20 | Stop reason: SURG

## 2019-12-20 RX ORDER — SODIUM CHLORIDE, SODIUM LACTATE, POTASSIUM CHLORIDE, CALCIUM CHLORIDE 600; 310; 30; 20 MG/100ML; MG/100ML; MG/100ML; MG/100ML
75 INJECTION, SOLUTION INTRAVENOUS CONTINUOUS
Status: DISCONTINUED | OUTPATIENT
Start: 2019-12-20 | End: 2019-12-24 | Stop reason: HOSPADM

## 2019-12-20 RX ORDER — PROPOFOL 10 MG/ML
INJECTION, EMULSION INTRAVENOUS AS NEEDED
Status: DISCONTINUED | OUTPATIENT
Start: 2019-12-20 | End: 2019-12-20 | Stop reason: SURG

## 2019-12-20 RX ADMIN — SODIUM CHLORIDE, SODIUM LACTATE, POTASSIUM CHLORIDE, AND CALCIUM CHLORIDE 75 ML/HR: .6; .31; .03; .02 INJECTION, SOLUTION INTRAVENOUS at 07:41

## 2019-12-20 RX ADMIN — SODIUM CHLORIDE, SODIUM LACTATE, POTASSIUM CHLORIDE, AND CALCIUM CHLORIDE: .6; .31; .03; .02 INJECTION, SOLUTION INTRAVENOUS at 08:11

## 2019-12-20 RX ADMIN — PROPOFOL 50 MG: 10 INJECTION, EMULSION INTRAVENOUS at 08:16

## 2019-12-20 RX ADMIN — PROPOFOL 50 MG: 10 INJECTION, EMULSION INTRAVENOUS at 08:18

## 2019-12-20 RX ADMIN — PROPOFOL 100 MG: 10 INJECTION, EMULSION INTRAVENOUS at 08:14

## 2019-12-20 NOTE — H&P
Patient seen and examined by me  H&P updated  Original H&P on paper in chart      Glory Hernández MD  12/20/2019  8:07 AM

## 2019-12-20 NOTE — ANESTHESIA POSTPROCEDURE EVALUATION
Post-Op Assessment Note    CV Status:  Stable  Pain Score: 0    Pain management: adequate     Mental Status:  Alert and awake   Hydration Status:  Stable and euvolemic   PONV Controlled:  Controlled   Airway Patency:  Patent and adequate   Post Op Vitals Reviewed: Yes      Staff: CRNA           BP   136/68   Temp      Pulse  52   Resp   20   SpO2   95

## 2019-12-20 NOTE — ANESTHESIA PREPROCEDURE EVALUATION
Review of Systems/Medical History  Patient summary reviewed  Chart reviewed  No history of anesthetic complications     Cardiovascular  Hyperlipidemia,    Pulmonary  Sleep apnea CPAP,        GI/Hepatic       Kidney stones,        Endo/Other    Obesity    GYN       Hematology   Musculoskeletal    Arthritis     Neurology   Psychology   Anxiety, Depression , bipolar disorder,              Physical Exam    Airway    Mallampati score: III  TM Distance: >3 FB  Neck ROM: full     Dental       Cardiovascular  Rhythm: regular, Rate: normal,     Pulmonary  Breath sounds clear to auscultation,     Other Findings        Anesthesia Plan  ASA Score- 2     Anesthesia Type- IV sedation with anesthesia with ASA Monitors  Additional Monitors:   Airway Plan:         Plan Factors-    Induction- intravenous  Postoperative Plan-     Informed Consent- Anesthetic plan and risks discussed with patient  I personally reviewed this patient with the CRNA  Discussed and agreed on the Anesthesia Plan with the CRNA  Val Pickard

## 2019-12-20 NOTE — H&P
This is a 64 y o  female with a history of morbid obesity and Body mass index is 38 02 kg/m²  Here for an EGD to evaluate the anatomy of the GI tract and to rule out the presence of H  pylori  Physical Exam    /58   Pulse 62   Temp 98 1 °F (36 7 °C) (Tympanic)   Resp 18   Ht 5' 4" (1 626 m)   Wt 100 kg (221 lb 8 oz)   SpO2 96%   BMI 38 02 kg/m²    AAOx3  RRR  CTA B  Abdomen obese  Benign  A/P:    This is a 64 y o  female with a history of morbid obesity and Body mass index is 38 02 kg/m²       Will proceed with the EGD and biopsies        Vikram Bonilla MD  12/20/2019  8:14 AM

## 2019-12-30 LAB
ALBUMIN SERPL-MCNC: 4.5 G/DL (ref 3.5–5.5)
ALBUMIN/GLOB SERPL: 1.9 {RATIO} (ref 1.2–2.2)
ALP SERPL-CCNC: 60 IU/L (ref 39–117)
ALT SERPL-CCNC: 13 IU/L (ref 0–32)
AST SERPL-CCNC: 14 IU/L (ref 0–40)
BILIRUB SERPL-MCNC: 0.4 MG/DL (ref 0–1.2)
BUN SERPL-MCNC: 16 MG/DL (ref 6–24)
BUN/CREAT SERPL: 20 (ref 9–23)
CALCIUM SERPL-MCNC: 9.7 MG/DL (ref 8.7–10.2)
CHLORIDE SERPL-SCNC: 100 MMOL/L (ref 96–106)
CHOLEST SERPL-MCNC: 159 MG/DL (ref 100–199)
CO2 SERPL-SCNC: 23 MMOL/L (ref 20–29)
CREAT SERPL-MCNC: 0.79 MG/DL (ref 0.57–1)
GLOBULIN SER-MCNC: 2.4 G/DL (ref 1.5–4.5)
GLUCOSE SERPL-MCNC: 104 MG/DL (ref 65–99)
HBA1C MFR BLD HPLC: 5.7 %
HDLC SERPL-MCNC: 64 MG/DL
LDLC SERPL CALC-MCNC: 74 MG/DL (ref 0–99)
MICRODELETION SYND BLD/T FISH: NORMAL
POTASSIUM SERPL-SCNC: 5 MMOL/L (ref 3.5–5.2)
PROT SERPL-MCNC: 6.9 G/DL (ref 6–8.5)
SL AMB EGFR AFRICAN AMERICAN: 96 ML/MIN/1.73
SL AMB EGFR NON AFRICAN AMERICAN: 83 ML/MIN/1.73
SODIUM SERPL-SCNC: 140 MMOL/L (ref 134–144)
TRIGL SERPL-MCNC: 105 MG/DL (ref 0–149)

## 2020-01-02 LAB
HBA1C MFR BLD: 5.7 % (ref 4.8–5.6)
TSH SERPL DL<=0.005 MIU/L-ACNC: 2.93 UIU/ML (ref 0.45–4.5)

## 2020-01-13 ENCOUNTER — APPOINTMENT (OUTPATIENT)
Dept: RADIOLOGY | Facility: CLINIC | Age: 60
End: 2020-01-13
Payer: COMMERCIAL

## 2020-01-13 ENCOUNTER — OFFICE VISIT (OUTPATIENT)
Dept: FAMILY MEDICINE CLINIC | Facility: CLINIC | Age: 60
End: 2020-01-13
Payer: COMMERCIAL

## 2020-01-13 VITALS
WEIGHT: 226 LBS | OXYGEN SATURATION: 95 % | SYSTOLIC BLOOD PRESSURE: 118 MMHG | HEIGHT: 64 IN | RESPIRATION RATE: 16 BRPM | DIASTOLIC BLOOD PRESSURE: 68 MMHG | HEART RATE: 69 BPM | BODY MASS INDEX: 38.58 KG/M2 | TEMPERATURE: 98 F

## 2020-01-13 DIAGNOSIS — M79.605 LEG PAIN, POSTERIOR, LEFT: Primary | ICD-10-CM

## 2020-01-13 DIAGNOSIS — M79.605 LEG PAIN, POSTERIOR, LEFT: ICD-10-CM

## 2020-01-13 DIAGNOSIS — E78.2 MIXED HYPERLIPIDEMIA: ICD-10-CM

## 2020-01-13 PROCEDURE — 99213 OFFICE O/P EST LOW 20 MIN: CPT | Performed by: FAMILY MEDICINE

## 2020-01-13 PROCEDURE — 73562 X-RAY EXAM OF KNEE 3: CPT

## 2020-01-13 PROCEDURE — 73590 X-RAY EXAM OF LOWER LEG: CPT

## 2020-01-13 RX ORDER — KETOROLAC TROMETHAMINE 10 MG/1
10 TABLET, FILM COATED ORAL EVERY 6 HOURS PRN
Qty: 9 TABLET | Refills: 0 | Status: SHIPPED | OUTPATIENT
Start: 2020-01-13 | End: 2020-02-12 | Stop reason: ALTCHOICE

## 2020-01-13 RX ORDER — METAXALONE 800 MG/1
800 TABLET ORAL 3 TIMES DAILY
Qty: 30 TABLET | Refills: 0 | Status: SHIPPED | OUTPATIENT
Start: 2020-01-13 | End: 2020-02-12 | Stop reason: ALTCHOICE
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
## 2020-01-15 ENCOUNTER — TELEPHONE (OUTPATIENT)
Dept: FAMILY MEDICINE CLINIC | Facility: CLINIC | Age: 60
End: 2020-01-15

## 2020-01-15 PROBLEM — M17.12 PRIMARY OSTEOARTHRITIS OF LEFT KNEE: Status: ACTIVE | Noted: 2020-01-15

## 2020-01-15 NOTE — TELEPHONE ENCOUNTER
Patient called looking for Xray results    Xray of knee and tibia/fibula    Please call when results are in chart

## 2020-01-15 NOTE — TELEPHONE ENCOUNTER
Spoke with pt discussed results  PT has what looks like arthritis in the knee and probably foot as she has spurring  Otherwise no acute findings  Cont treatment as prescribed  Seems to be improving    Follow with ortho if pain does not clear

## 2020-01-21 ENCOUNTER — OFFICE VISIT (OUTPATIENT)
Dept: BARIATRICS | Facility: CLINIC | Age: 60
End: 2020-01-21

## 2020-01-21 ENCOUNTER — OFFICE VISIT (OUTPATIENT)
Dept: OBGYN CLINIC | Facility: CLINIC | Age: 60
End: 2020-01-21
Payer: COMMERCIAL

## 2020-01-21 VITALS — WEIGHT: 221.6 LBS | BODY MASS INDEX: 37.83 KG/M2 | HEIGHT: 64 IN

## 2020-01-21 VITALS — BODY MASS INDEX: 37.83 KG/M2 | WEIGHT: 221.6 LBS | HEIGHT: 64 IN

## 2020-01-21 VITALS
HEIGHT: 64 IN | SYSTOLIC BLOOD PRESSURE: 122 MMHG | DIASTOLIC BLOOD PRESSURE: 73 MMHG | BODY MASS INDEX: 37.83 KG/M2 | WEIGHT: 221.6 LBS | HEART RATE: 60 BPM

## 2020-01-21 DIAGNOSIS — R63.5 ABNORMAL WEIGHT GAIN: Primary | ICD-10-CM

## 2020-01-21 DIAGNOSIS — Z98.84 BARIATRIC SURGERY STATUS: ICD-10-CM

## 2020-01-21 DIAGNOSIS — S86.812A STRAIN OF CALF MUSCLE, LEFT, INITIAL ENCOUNTER: ICD-10-CM

## 2020-01-21 DIAGNOSIS — E66.01 MORBID (SEVERE) OBESITY DUE TO EXCESS CALORIES (HCC): Primary | ICD-10-CM

## 2020-01-21 DIAGNOSIS — E66.01 SEVERE OBESITY (BMI 35.0-39.9) WITH COMORBIDITY (HCC): Primary | ICD-10-CM

## 2020-01-21 PROCEDURE — RECHECK

## 2020-01-21 PROCEDURE — 99203 OFFICE O/P NEW LOW 30 MIN: CPT | Performed by: ORTHOPAEDIC SURGERY

## 2020-01-21 NOTE — PROGRESS NOTES
Patient met with RODERICK, reviewed some poor eating choices she has made and reflecting on why she made those choices  Patient is evaluating possible self sabotage given that she is closer to surgery and how to manage some impulses she has with her eating  She is talking with her therapist about these impulses, RODERICK encouraged food and mood logging to identify patterns in thoughts and eating behaviors  RODERICK also pointed out that language and words can be powerful, referring to her eating habits as a "diet" or "good and bad" are judgements and temporary  RODERICK reframed these things to "lifestyle" and "choices" so that it brings a sense of empowerment and control  Patient will be working on [employer], continuing to be active and invest in her own self care  Patient will contact office if she feels she needs to schedule another follow up appointment, she was given most recent support group flyer
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
## 2020-01-21 NOTE — PROGRESS NOTES
Pt came discussed submitting her clinical information to the insurance company along with possible surgery dates and preop class dates  Will submit clinicals to insurance on 1/22/20 and call pt to let her know

## 2020-01-21 NOTE — PROGRESS NOTES
Bariatric Nutrition Assessment Note  3 weight checks (3 of 3 today)  Type of surgery    Pre-op:  Leaning toward Gastric bypass: laparoscopic  Surgery Date: TBD  Surgeon: Dr Gonzales Fuller  64 y o   female     Wt with BMI of 25: 146 4lbs  Pre-Op Excess Wt: 96lbs  Height 5' 4" (1 626 m), weight 101 kg (221 lb 9 6 oz)  Body mass index is 38 04 kg/m²  Net weight loss:  20 7lbs since eval    Review of History and Medications   Past Medical History:   Diagnosis Date    Anxiety     Arthritis     DDD    Bipolar 1 disorder ([address] )     Costochondritis     last assessed: 4/4/2016    CPAP (continuous positive airway pressure) dependence     Depression     HPV (human papilloma virus) infection 12/2017    found on colonoscopy    Kidney stone     ADRIANA (obstructive sleep apnea)     cpap level 10-15    Stress     Stress incontinence     Wears glasses      Past Surgical History:   Procedure Laterality Date    BILATERAL OOPHORECTOMY      COLONOSCOPY      COLONOSCOPY N/A 12/27/2017    Procedure: COLONOSCOPY;  Surgeon: Aminta Ortiz MD;  Location: [facility];   Service: Gastroenterology    HYSTERECTOMY      partial    LIPOMA RESECTION      back    LIPOSUCTION      lipoma     DIAGNOSTIC ANOSCOPY & BIOPSY N/A 8/30/2018    Procedure: ANOSCOPY HIGH RESOLUTION;  Surgeon: Kahlil Najera MD;  [facility];  Service: Colorectal     DIAGNOSTIC ANOSCOPY & BIOPSY N/A 4/4/2019    Procedure: ANOSCOPY HIGH RESOLUTION;  Surgeon: Kahlil Najera MD;  [facility];  Service: Colorectal     DIAGNOSTIC ANOSCOPY & BIOPSY N/A 10/4/2019    Procedure: ANOSCOPY HIGH RESOLUTION;  Surgeon: Kahlil Najera MD;  [facility];  Service: Colorectal    TN DESTRUCTION,ANAL LESION(S),EXTENSIVE N/A 4/12/2018    Procedure: EXCISION CONDYLOMA ANAL/RECTAL;  Surgeon: Kahlil Najera MD;  [facility];  Service: Colorectal    TN DESTRUCTION,ANAL LESION(S),EXTENSIVE N/A 2018    Procedure: EXCISION / FULGURATION OF LESIONS;  Surgeon: Cortney Llanos MD;  [facility];  Service: Colorectal    MT SURG DIAGNOSTIC EXAM, ANORECTAL N/A 2018    Procedure: EXAM UNDER ANESTHESIA (EUA); Surgeon: Cortney Llanos MD;  [facility];  Service: Colorectal    MT SURG DIAGNOSTIC EXAM, ANORECTAL N/A 2018    Procedure: EXAM UNDER ANESTHESIA (EUA); Surgeon: Cortney Llanos MD;  [facility];  Service: Colorectal    MT SURG DIAGNOSTIC EXAM, ANORECTAL N/A 2019    Procedure: EXAM UNDER ANESTHESIA (EUA); Surgeon: Cortney Llanos MD;  [facility];  Service: Colorectal    MT SURG DIAGNOSTIC EXAM, ANORECTAL N/A 10/4/2019    Procedure: EXAM UNDER ANESTHESIA (EUA); Surgeon: Cortney Llanos MD;  [facility];  Service: Colorectal    TONSILLECTOMY      WISDOM TOOTH EXTRACTION      x4     Social History     Socioeconomic History    Marital status: /Civil Union     Spouse name: Not on file    Number of children: Not on file    Years of education: Not on file    Highest education level: Not on file   Occupational History    Not on file   Social Needs    Financial resource strain: Not on file    Food insecurity:     Worry: Not on file     Inability: Not on file    Transportation needs:     Medical: Not on file     Non-medical: Not on file   Tobacco Use    Smoking status: Former Smoker     Packs/day: 0 25     Years: 10 00     Pack years: 2 50     Types: Cigarettes, Cigars     Start date: 2008     Last attempt to quit: 2018     Years since quittin 5    Smokeless tobacco: Never Used    Tobacco comment: quit 2018   Substance and Sexual Activity    Alcohol use:  Yes     Alcohol/week: 2 0 standard drinks     Types: 1 Glasses of wine, 1 Cans of beer per week     Frequency: 2-4 times a month     Drinks per session: 1 or 2     Binge frequency: Never     Comment: socially    Drug use: No    Sexual activity: Yes     Partners: Male     Birth control/protection: Post-menopausal   Lifestyle    Physical activity:     Days per week: Not on file     Minutes per session: Not on file    Stress: Not on file   Relationships    Social connections:     Talks on phone: Not on file     Gets together: Not on file     Attends Voodoo service: Not on file     Active member of club or organization: Not on file     Attends meetings of clubs or organizations: Not on file     Relationship status: Not on file    Intimate partner violence:     Fear of current or ex partner: Not on file     Emotionally abused: Not on file     Physically abused: Not on file     Forced sexual activity: Not on file   Other Topics Concern    Not on file   Social History Narrative    Not on file       Current Outpatient Medications:     FLUoxetine (PROzac) 40 MG capsule, Take 40 mg by mouth daily in the early morning , Disp: , Rfl:     ketorolac (TORADOL) 10 mg tablet, Take 1 tablet (10 mg total) by mouth every 6 (six) hours as needed for moderate pain, Disp: 9 tablet, Rfl: 0    metaxalone (SKELAXIN) 800 mg tablet, Take 1 tablet (800 mg total) by mouth 3 (three) times a day, Disp: 30 tablet, Rfl: 0    NON FORMULARY, [employer] Life Long Vitality  Regime natural supplements per pt , Disp: , Rfl:     Omega-3 Fatty Acids (FISH OIL) 1200 MG CAPS, Take by mouth, Disp: , Rfl:     rosuvastatin (CRESTOR) 5 mg tablet, Take 1 tablet (5 mg total) by mouth daily, Disp: 90 tablet, Rfl: 3  Food Intake and Lifestyle Assessment   Food Intake Assessment completed via [employer] jason  Ranging from 1739-0362 calories per day  Wake:  5:15am  Work:  Internet marketing, sedentary job  Breakfast: tea with stevia and ff half and half, sometimes will have food, but often won't    If has food will often be a large bowl of cereal  Snack: -   Lunch: 1/2 turkey ham and cheese on whole grain bread, veggie sticks, grapes or sometimes [employer] 1/2 grilled cheese with veggie soup or veggie pasta with veggies and chicken or tuna with hard boiled egg, veggies and crackers OR english muffin with sausage and 2 slices of cheese OR a protein shake OR soup and crackers OR salad with a lot of blue cheese dressing and 8 ben kisses  Snack: -  Dinner: 7pm-"good"  Fish, veggies and starch OR steak, rice and asparagus OR "bad" out to eat will have 1-2 beers with wings, burger, fries  A few times feels she is doing some self sabotage because had 2 whoppers yesterday and 3 slices of pizza the other night  Snack: has greatly decreased, been more mindful and often healthier  Beverage intake: water, sugar free beverages, diet soda, coffee/tea and alcohol  Protein supplement: sometimes will have a premier-advised can use as a meal replacement for one meal  Estimated protein intake per day: 85-105gm  Estimated fluid intake per day: 64oz+, 2-12oz cup tea with ff H&H and stevia, 2 alcohol per week  Meals eaten away from home: fast food 2 times per week, out on Friday night  Typical meal pattern: has been having breakfast at least 5 days per week instead of skipping and grazing a lot less and has been more mindful of physical hunger and emotional hunger  Eating Behaviors: Consumption of high calorie/ high fat foods, Large portion sizes, Frequent snacking/ grazing and Mindless eating  Food allergies or intolerances: Allergies   Allergen Reactions    Sulfa Antibiotics Rash     Cultural or Confucianism considerations: none  Supplements:  Fish oil, tumeric, multi, and more but can't remember all the names  Physical Assessment  Physical Activity  Types of exercise:  Yoga for the past month 4-5 days per week, wants to get back to walking, but has been having leg pain  Current physical limitations: has a degenerative disc in back    Psychosocial Assessment   Support systems: spouse and children  Socioeconomic factors: none    Nutrition Diagnosis  Diagnosis: Overweight / Obesity (NC-3 3)  Related to: Physical inactivity and Excessive energy intake  As Evidenced by: BMI >25     Nutrition Prescription: Recommend the following diet  Regular  Interventions and Teaching   Discussed pre-op and post-op nutrition guidelines  Patient educated and handouts provided  Surgical changes to stomach / GI  Capacity of post-surgery stomach  Post-op diet progression  Post-op supplements  Adequate hydration  Expected weight loss  Nutrition considerations after surgery  Protein supplements  Dietary and lifestyle changes  Possible problems with poor eating habits  Intuitive eating  Techniques for self monitoring and keeping daily food journal  Education provided to: patient    Barriers to learning: No barriers identified  Readiness to change: preparation  Prior research on procedure: books, internet and friends or family  Comprehension: verbalizes understanding   Expected Compliance: good  Recommendations  Pt is an appropriate candidate for surgery  Yes  Evaluation / Monitoring  Dietitian to Monitor: Body weight Physical activity  3 months weight checks (3 of 3 Harsh)   PCP letter: referral in [facility] 10/17   Labs: 1/9/20 WNL   Sleep:  already uses CPAP   Cardio:  completed 11/21   Meet w/surgeon: 11/13og   EGD: done 12/20   Support Group:  11/7   Weight loss:  5% by day of surgery: -12lbs (230 4lbs)-achieved as of 12/16 1/2 in order to submit to insurance: -6lbs (236 4lbs)-achieved as of 11/13  Can go to a BMI of 35 (204lbs) since has ADRIANA     Pt has lost 20 7lbs since her initial eval   Reviewed food log and she does appear to be making healthier snack choices, has decreased the snack and is eating breakfast more often, but she does still have a lot of high calorie, high fat food choices, particularly when she goes out to eat ie:  Burgers, wings, fries, beer, etc   Pt is aware of the choices and feels she may be doing a little self sabotage as surgery is getting closer    She has started making some behavioral changes such as eating slower and more mindful of her hunger que  Reviewed post-op diet progression, provided suggestions and reviewed post-op supplements  Provided with chewable vitamins and calcium to try  Goals  Food journal via [employer]itastic  Continue yoga 5 days per week, start walking 30 mins 2-3 days per week as tolerated  Complete lesson plans 1-6  Continue with eating breakfast everyday  Can use a protein shake as needed as a meal replacement  Work on decreasing caffeine  Continue to avoid the mindless/stress eating  Try vitamins samples    Time Spent:   30mins

## 2020-01-22 ENCOUNTER — TELEPHONE (OUTPATIENT)
Dept: BARIATRICS | Facility: CLINIC | Age: 60
End: 2020-01-22

## 2020-01-22 NOTE — TELEPHONE ENCOUNTER
Called Amy Lei to let her know her case has been submitted to insurance and her pending case number is [id number]

## 2020-01-28 ENCOUNTER — TELEPHONE (OUTPATIENT)
Dept: BARIATRICS | Facility: CLINIC | Age: 60
End: 2020-01-28

## 2020-01-28 NOTE — TELEPHONE ENCOUNTER
Called patient I let her know that I did not receive any paper work from [employer] but that I will follow up with them to see if there is any additional documentation needed and call her back with an update    ----- Message from Rachele Armendariz sent at 1/28/2020  9:56 AM EST -----  Regarding: FW: Pre-Op/Post-Op Question  Contact: [phone]      ----- Message -----  From: Vilma Branham  Sent: 1/28/2020   9:49 AM EST  To: , #  Subject: Pre-Op/Post-Op Question                          Hi, I assume you received the request for additional information from [employer] Reynoso   just following up to make sure it didnt get lost in the mix  Please keep me informed     Thanks,  Vilma Branham  [phone]

## 2020-01-30 ENCOUNTER — OFFICE VISIT (OUTPATIENT)
Dept: BARIATRICS | Facility: CLINIC | Age: 60
End: 2020-01-30

## 2020-01-30 DIAGNOSIS — R63.5 ABNORMAL WEIGHT GAIN: Primary | ICD-10-CM

## 2020-01-30 PROCEDURE — RECHECK

## 2020-01-31 VITALS — HEIGHT: 64 IN | BODY MASS INDEX: 38.64 KG/M2 | WEIGHT: 226.3 LBS

## 2020-01-31 NOTE — PROGRESS NOTES
Patient came in for preop education class and weight check  Patiient will be traveling during the time she will be on her 2 week liquid diet  Stressed the impotance of planning her meals ahead so it avoids any "convient eating" and will help keep her on track until her day of surgery

## 2020-02-03 ENCOUNTER — PREP FOR PROCEDURE (OUTPATIENT)
Dept: BARIATRICS | Facility: CLINIC | Age: 60
End: 2020-02-03

## 2020-02-03 DIAGNOSIS — E66.01 MORBID OBESITY (HCC): Primary | ICD-10-CM

## 2020-02-03 DIAGNOSIS — G47.33 OBSTRUCTIVE SLEEP APNEA: ICD-10-CM

## 2020-02-03 DIAGNOSIS — E78.2 MIXED HYPERLIPIDEMIA: ICD-10-CM

## 2020-02-12 ENCOUNTER — OFFICE VISIT (OUTPATIENT)
Dept: BARIATRICS | Facility: CLINIC | Age: 60
End: 2020-02-12
Payer: COMMERCIAL

## 2020-02-12 VITALS
DIASTOLIC BLOOD PRESSURE: 74 MMHG | SYSTOLIC BLOOD PRESSURE: 118 MMHG | HEIGHT: 64 IN | TEMPERATURE: 97.9 F | RESPIRATION RATE: 17 BRPM | BODY MASS INDEX: 37.46 KG/M2 | HEART RATE: 60 BPM | WEIGHT: 219.4 LBS

## 2020-02-12 DIAGNOSIS — Z01.818 ENCOUNTER FOR OTHER PREPROCEDURAL EXAMINATION: Primary | ICD-10-CM

## 2020-02-12 DIAGNOSIS — M17.12 PRIMARY OSTEOARTHRITIS OF LEFT KNEE: ICD-10-CM

## 2020-02-12 DIAGNOSIS — E66.01 SEVERE OBESITY (BMI 35.0-39.9) WITH COMORBIDITY (HCC): ICD-10-CM

## 2020-02-12 DIAGNOSIS — E66.01 MORBID OBESITY (HCC): ICD-10-CM

## 2020-02-12 DIAGNOSIS — R73.01 IFG (IMPAIRED FASTING GLUCOSE): ICD-10-CM

## 2020-02-12 DIAGNOSIS — M54.50 LOW BACK PAIN: ICD-10-CM

## 2020-02-12 DIAGNOSIS — G47.33 OSA (OBSTRUCTIVE SLEEP APNEA): ICD-10-CM

## 2020-02-12 DIAGNOSIS — E66.01 MORBID (SEVERE) OBESITY DUE TO EXCESS CALORIES (HCC): Primary | ICD-10-CM

## 2020-02-12 PROCEDURE — 3008F BODY MASS INDEX DOCD: CPT | Performed by: SURGERY

## 2020-02-12 PROCEDURE — 1036F TOBACCO NON-USER: CPT | Performed by: SURGERY

## 2020-02-12 PROCEDURE — 99213 OFFICE O/P EST LOW 20 MIN: CPT | Performed by: SURGERY

## 2020-02-12 RX ORDER — ACETAMINOPHEN 325 MG/1
975 TABLET ORAL ONCE
Status: CANCELLED | OUTPATIENT
Start: 2020-02-17 | End: 2020-02-12

## 2020-02-12 RX ORDER — OXYCODONE HYDROCHLORIDE 5 MG/1
5 TABLET ORAL EVERY 4 HOURS PRN
Qty: 10 TABLET | Refills: 0 | Status: SHIPPED | OUTPATIENT
Start: 2020-02-12 | End: 2020-05-20 | Stop reason: ALTCHOICE

## 2020-02-12 RX ORDER — CELECOXIB 200 MG/1
200 CAPSULE ORAL ONCE
Status: CANCELLED | OUTPATIENT
Start: 2020-02-17 | End: 2020-02-12

## 2020-02-12 RX ORDER — SCOLOPAMINE TRANSDERMAL SYSTEM 1 MG/1
1 PATCH, EXTENDED RELEASE TRANSDERMAL ONCE
Status: CANCELLED | OUTPATIENT
Start: 2020-02-17 | End: 2020-02-12

## 2020-02-12 RX ORDER — CEFAZOLIN SODIUM 2 G/50ML
2000 SOLUTION INTRAVENOUS ONCE
Status: CANCELLED | OUTPATIENT
Start: 2020-02-17 | End: 2020-02-12

## 2020-02-12 RX ORDER — OMEPRAZOLE 20 MG/1
20 CAPSULE, DELAYED RELEASE ORAL DAILY
Qty: 90 CAPSULE | Refills: 1 | Status: SHIPPED | OUTPATIENT
Start: 2020-02-12 | End: 2020-05-20 | Stop reason: SDUPTHER

## 2020-02-12 RX ORDER — GABAPENTIN 100 MG/1
600 CAPSULE ORAL ONCE
Status: CANCELLED | OUTPATIENT
Start: 2020-02-17 | End: 2020-02-12

## 2020-02-12 RX ORDER — HEPARIN SODIUM 5000 [USP'U]/ML
5000 INJECTION, SOLUTION INTRAVENOUS; SUBCUTANEOUS
Status: CANCELLED | OUTPATIENT
Start: 2020-02-17 | End: 2020-02-18

## 2020-02-12 RX ORDER — FLUOXETINE HYDROCHLORIDE 40 MG/1
40 CAPSULE ORAL DAILY
COMMUNITY
Start: 2019-11-01 | End: 2020-02-20 | Stop reason: SDUPTHER

## 2020-02-12 NOTE — PROGRESS NOTES
H&P Exam - Bariatric Surgery   Yoanna Rosario 64 y o  female MRN: [id number]  Unit/Bed#:  Encounter: [id number]      HPI:    64 y o  yo morbidly obese female found to be a good candidate to undergo a weight loss operation upon being enrolled here at the [facility]   She has been pre certified to undergo a Laparoscopic nevaeh-en-y gastric bypass  Here today to review her pre op test results  Has been medically cleared for the procedure  Associated Conditions: elevated LDL and sleep apnea  Disease Complications: sleep apnea, osteoarthritis and Mild stress incontinence    I have discussed with her at length the risks and benefits of the operation and reiterated the components of our multidisciplinary program and the importance of compliance and follow up in the post operative period  Although there is a great statistical chance of improvement or even resolution of most of her associated comorbidities, the results vary from patient to patient and they largely depend on his commitment  The patient was also instructed with regards to the importance of behavior modification, nutritional counseling, support meeting attendance and lifestyle changes that are important to ensure success  She was given the opportunity to ask questions and I have answered all of them  I have addressed with the patient the level of CODE STATUS for this hospital stay and after explaining the different options currently she wishes to be a Level I  She understands and wishes to proceed  She has lost all the weight required prior to surgery  Review of Systems   Constitutional: Positive for fatigue  Musculoskeletal: Positive for arthralgias and back pain         Historical Information   Past Medical History:   Diagnosis Date    Anxiety     Arthritis     DDD    Bipolar 1 disorder ([facility] [address] )     Costochondritis     last assessed: 4/4/2016    CPAP (continuous positive airway pressure) dependence     Depression  HPV (human papilloma virus) infection 12/2017    found on colonoscopy    Kidney stone     ADRIANA (obstructive sleep apnea)     cpap level 10-15    Stress     Stress incontinence     Wears glasses      Past Surgical History:   Procedure Laterality Date    BILATERAL OOPHORECTOMY      COLONOSCOPY      COLONOSCOPY N/A 12/27/2017    Procedure: COLONOSCOPY;  Surgeon: Tavo Em MD;  Location: [facility]; Service: Gastroenterology    HYSTERECTOMY      partial    LIPOMA RESECTION      back    LIPOSUCTION      lipoma     DIAGNOSTIC ANOSCOPY & BIOPSY N/A 8/30/2018    Procedure: ANOSCOPY HIGH RESOLUTION;  Surgeon: London Bradley MD;  [facility];  Service: Colorectal     DIAGNOSTIC ANOSCOPY & BIOPSY N/A 4/4/2019    Procedure: ANOSCOPY HIGH RESOLUTION;  Surgeon: London Bradley MD;  [facility];  Service: Colorectal     DIAGNOSTIC ANOSCOPY & BIOPSY N/A 10/4/2019    Procedure: ANOSCOPY HIGH RESOLUTION;  Surgeon: London Bradley MD;  [facility];  Service: Colorectal    AL DESTRUCTION,ANAL LESION(S),EXTENSIVE N/A 4/12/2018    Procedure: EXCISION CONDYLOMA ANAL/RECTAL;  Surgeon: London Bradley MD;  [facility];  Service: Colorectal    AL DESTRUCTION,ANAL LESION(S),EXTENSIVE N/A 8/30/2018    Procedure: EXCISION / FULGURATION OF LESIONS;  Surgeon: London Bradley MD;  [facility];  Service: Colorectal    AL SURG DIAGNOSTIC EXAM, ANORECTAL N/A 4/12/2018    Procedure: EXAM UNDER ANESTHESIA (EUA); Surgeon: London Bradley MD;  [facility];  Service: Colorectal    AL SURG DIAGNOSTIC EXAM, ANORECTAL N/A 8/30/2018    Procedure: EXAM UNDER ANESTHESIA (EUA); Surgeon: London Bradley MD;  [facility];  Service: Colorectal    AL SURG DIAGNOSTIC EXAM, ANORECTAL N/A 4/4/2019    Procedure: EXAM UNDER ANESTHESIA (EUA);   Surgeon: London Bradley MD;  [facility];  Service: Colorectal    AL SURG DIAGNOSTIC EXAM, ANORECTAL N/A 10/4/2019    Procedure: EXAM UNDER ANESTHESIA (EUA); Surgeon: Hector Hodge MD;  [facility];  Service: Colorectal    TONSILLECTOMY      WISDOM TOOTH EXTRACTION      x4     Social History   Social History     Substance and Sexual Activity   Alcohol Use Yes    Alcohol/week: 2 0 standard drinks    Types: 1 Glasses of wine, 1 Cans of beer per week    Frequency: 2-4 times a month    Drinks per session: 1 or 2    Binge frequency: Never    Comment: socially     Social History     Substance and Sexual Activity   Drug Use No     Social History     Tobacco Use   Smoking Status Former Smoker    Packs/day: 0 25    Years: 10 00    Pack years: 2 50    Types: Cigarettes, Cigars    Start date: 2008    Last attempt to quit: 2018    Years since quittin 6   Smokeless Tobacco Never Used   Tobacco Comment    quit 2018     Family History: DVT (Son; sports related injury)    Meds/Allergies   all medications and allergies reviewed  Allergies   Allergen Reactions    Sulfa Antibiotics Rash       Objective     Current Vitals:   Blood Pressure: 118/74 (20)  Pulse: 60 (20)  Temperature: 97 9 °F (36 6 °C) (20)  Temp Source: Tympanic (20)  Respirations: 17 (20)  Height: 5' 4" (162 6 cm) (20)  Weight - Scale: 99 5 kg (219 lb 6 4 oz) (20)        Physical Exam   Constitutional: She is oriented to person, place, and time  She appears well-developed and well-nourished  HENT:   Head: Atraumatic  Eyes: EOM are normal    Neck: Neck supple  Cardiovascular: Normal rate  Pulmonary/Chest: Effort normal and breath sounds normal    Abdominal: Soft  She exhibits no distension  There is no tenderness  Musculoskeletal: Normal range of motion  Neurological: She is alert and oriented to person, place, and time  Skin: Skin is warm and dry  Psychiatric: She has a normal mood and affect   Her behavior is normal    Vitals reviewed  Lab Results: I have personally reviewed pertinent lab results  Imaging: I have personally reviewed pertinent reports  EKG, Pathology, and Other Studies: I have personally reviewed pertinent reports  Code Status: Level 1    Assessment:  64 y o  yo morbidly obese female found to be a good candidate to undergo a weight loss operation upon being enrolled here at the [facility]  She has completed all of the preoperative process for bariatric surgery      Plan:  - Plan for laparoscopic possible open nevaeh-en-y gastric bypass with intraoperative EGD  - consent signed  - preop orders placed  - ([phone])

## 2020-02-13 ENCOUNTER — APPOINTMENT (OUTPATIENT)
Dept: LAB | Facility: HOSPITAL | Age: 60
End: 2020-02-13
Attending: SURGERY
Payer: COMMERCIAL

## 2020-02-13 ENCOUNTER — TRANSCRIBE ORDERS (OUTPATIENT)
Dept: ADMINISTRATIVE | Facility: HOSPITAL | Age: 60
End: 2020-02-13

## 2020-02-13 DIAGNOSIS — Z20.818 MRSA EXPOSURE: ICD-10-CM

## 2020-02-13 DIAGNOSIS — Z20.818 MRSA EXPOSURE: Primary | ICD-10-CM

## 2020-02-13 PROCEDURE — 87081 CULTURE SCREEN ONLY: CPT

## 2020-02-13 RX ORDER — LANOLIN ALCOHOL/MO/W.PET/CERES
CREAM (GRAM) TOPICAL
COMMUNITY

## 2020-02-13 RX ORDER — MULTIVITAMIN
1 TABLET ORAL DAILY
COMMUNITY
End: 2020-08-21 | Stop reason: ALTCHOICE

## 2020-02-14 ENCOUNTER — TELEPHONE (OUTPATIENT)
Dept: BARIATRICS | Facility: CLINIC | Age: 60
End: 2020-02-14

## 2020-02-14 LAB — MRSA NOSE QL CULT: NORMAL

## 2020-02-14 NOTE — TELEPHONE ENCOUNTER
Pre-op call was made to patient, message left, to follow up on how they are doing and to remind them to continue with all medical and dietary directions that were given at pre-op class regarding liver shrinking diet and hydration  They were encouraged to purchase all vitamins and protein shakes for post op use as well as to begin Miralax three days prior to surgery as directed in Section 6 of their manual   They were reminded of the Ensure Pre-surgery drinks protocol and to bring their completed yellow form with them to surgery as well as their CPAP-BiPAP machine if they use one  Lastly, they were informed that they would be weighed the morning of surgery, day of surgery weight is 230 4lbs, and to give the office a call if they had any further questions or concerns

## 2020-02-15 ENCOUNTER — ANESTHESIA EVENT (OUTPATIENT)
Dept: PERIOP | Facility: HOSPITAL | Age: 60
DRG: 620 | End: 2020-02-15
Payer: COMMERCIAL

## 2020-02-15 NOTE — ANESTHESIA PREPROCEDURE EVALUATION
Review of Systems/Medical History  Patient summary reviewed  Chart reviewed  No history of anesthetic complications     Cardiovascular  Hyperlipidemia,    Pulmonary  Smoker ex-smoker  , Sleep apnea CPAP,        GI/Hepatic       Kidney stones (h/o stones),        Endo/Other    Obesity    GYN    Hysterectomy,        Hematology   Musculoskeletal  Back pain , lumbar pain,   Arthritis     Neurology   Psychology   Anxiety, Depression , bipolar disorder,              Physical Exam    Airway    Mallampati score: III  TM Distance: >3 FB  Neck ROM: full     Dental       Cardiovascular  Rhythm: regular, Rate: normal,     Pulmonary  Breath sounds clear to auscultation,     Other Findings        Anesthesia Plan  ASA Score- 2     Anesthesia Type- general with ASA Monitors  Additional Monitors:   Airway Plan: ETT  Plan Factors-    Induction- intravenous  Postoperative Plan- Plan for postoperative opioid use  Planned trial extubation    Informed Consent- Anesthetic plan and risks discussed with patient  I personally reviewed this patient with the CRNA  Discussed and agreed on the Anesthesia Plan with the CRNA  Mitch Ceja

## 2020-02-17 ENCOUNTER — HOSPITAL ENCOUNTER (INPATIENT)
Facility: HOSPITAL | Age: 60
LOS: 1 days | Discharge: HOME | DRG: 620 | End: 2020-02-18
Attending: SURGERY | Admitting: SURGERY
Payer: COMMERCIAL

## 2020-02-17 ENCOUNTER — ANESTHESIA (OUTPATIENT)
Dept: PERIOP | Facility: HOSPITAL | Age: 60
DRG: 620 | End: 2020-02-17
Payer: COMMERCIAL

## 2020-02-17 DIAGNOSIS — Z98.84 S/P GASTRIC BYPASS: Primary | ICD-10-CM

## 2020-02-17 DIAGNOSIS — R11.0 NAUSEA: ICD-10-CM

## 2020-02-17 PROCEDURE — 43644 LAP GASTRIC BYPASS/ROUX-EN-Y: CPT | Performed by: SURGERY

## 2020-02-17 PROCEDURE — 43644 LAP GASTRIC BYPASS/ROUX-EN-Y: CPT | Performed by: PHYSICIAN ASSISTANT

## 2020-02-17 PROCEDURE — 0D164ZA BYPASS STOMACH TO JEJUNUM, PERCUTANEOUS ENDOSCOPIC APPROACH: ICD-10-PCS | Performed by: SURGERY

## 2020-02-17 PROCEDURE — C9113 INJ PANTOPRAZOLE SODIUM, VIA: HCPCS | Performed by: SURGERY

## 2020-02-17 PROCEDURE — 94760 N-INVAS EAR/PLS OXIMETRY 1: CPT

## 2020-02-17 PROCEDURE — C9290 INJ, BUPIVACAINE LIPOSOME: HCPCS | Performed by: SURGERY

## 2020-02-17 PROCEDURE — NC001 PR NO CHARGE: Performed by: SURGERY

## 2020-02-17 DEVICE — SEAMGUARD STPL REINF ENDO GIA ULTRA UNIV 60 PURPLE: Type: IMPLANTABLE DEVICE | Site: ABDOMEN | Status: FUNCTIONAL

## 2020-02-17 RX ORDER — GABAPENTIN 100 MG/1
600 CAPSULE ORAL ONCE
Status: COMPLETED | OUTPATIENT
Start: 2020-02-17 | End: 2020-02-17

## 2020-02-17 RX ORDER — LIDOCAINE HYDROCHLORIDE 20 MG/ML
INJECTION, SOLUTION EPIDURAL; INFILTRATION; INTRACAUDAL; PERINEURAL AS NEEDED
Status: DISCONTINUED | OUTPATIENT
Start: 2020-02-17 | End: 2020-02-17 | Stop reason: SURG

## 2020-02-17 RX ORDER — ACETAMINOPHEN 325 MG/1
975 TABLET ORAL ONCE
Status: COMPLETED | OUTPATIENT
Start: 2020-02-17 | End: 2020-02-17

## 2020-02-17 RX ORDER — PANTOPRAZOLE SODIUM 40 MG/1
40 INJECTION, POWDER, FOR SOLUTION INTRAVENOUS ONCE
Status: COMPLETED | OUTPATIENT
Start: 2020-02-17 | End: 2020-02-17

## 2020-02-17 RX ORDER — DEXAMETHASONE SODIUM PHOSPHATE 4 MG/ML
INJECTION, SOLUTION INTRA-ARTICULAR; INTRALESIONAL; INTRAMUSCULAR; INTRAVENOUS; SOFT TISSUE AS NEEDED
Status: DISCONTINUED | OUTPATIENT
Start: 2020-02-17 | End: 2020-02-17 | Stop reason: SURG

## 2020-02-17 RX ORDER — PROMETHAZINE HYDROCHLORIDE 25 MG/ML
25 INJECTION, SOLUTION INTRAMUSCULAR; INTRAVENOUS EVERY 6 HOURS PRN
Status: DISCONTINUED | OUTPATIENT
Start: 2020-02-17 | End: 2020-02-18 | Stop reason: HOSPADM

## 2020-02-17 RX ORDER — OXYCODONE HCL 5 MG/5 ML
5 SOLUTION, ORAL ORAL EVERY 4 HOURS PRN
Status: DISCONTINUED | OUTPATIENT
Start: 2020-02-17 | End: 2020-02-18 | Stop reason: HOSPADM

## 2020-02-17 RX ORDER — CEFAZOLIN SODIUM 2 G/50ML
2000 SOLUTION INTRAVENOUS ONCE
Status: COMPLETED | OUTPATIENT
Start: 2020-02-17 | End: 2020-02-17

## 2020-02-17 RX ORDER — KETOROLAC TROMETHAMINE 30 MG/ML
15 INJECTION, SOLUTION INTRAMUSCULAR; INTRAVENOUS EVERY 6 HOURS
Status: COMPLETED | OUTPATIENT
Start: 2020-02-17 | End: 2020-02-18

## 2020-02-17 RX ORDER — ROCURONIUM BROMIDE 10 MG/ML
INJECTION, SOLUTION INTRAVENOUS AS NEEDED
Status: DISCONTINUED | OUTPATIENT
Start: 2020-02-17 | End: 2020-02-17 | Stop reason: SURG

## 2020-02-17 RX ORDER — HEPARIN SODIUM 5000 [USP'U]/ML
5000 INJECTION, SOLUTION INTRAVENOUS; SUBCUTANEOUS
Status: COMPLETED | OUTPATIENT
Start: 2020-02-17 | End: 2020-02-17

## 2020-02-17 RX ORDER — MIDAZOLAM HYDROCHLORIDE 2 MG/2ML
INJECTION, SOLUTION INTRAMUSCULAR; INTRAVENOUS AS NEEDED
Status: DISCONTINUED | OUTPATIENT
Start: 2020-02-17 | End: 2020-02-17 | Stop reason: SURG

## 2020-02-17 RX ORDER — SCOLOPAMINE TRANSDERMAL SYSTEM 1 MG/1
1 PATCH, EXTENDED RELEASE TRANSDERMAL ONCE
Status: DISCONTINUED | OUTPATIENT
Start: 2020-02-17 | End: 2020-02-18 | Stop reason: HOSPADM

## 2020-02-17 RX ORDER — SODIUM CHLORIDE, SODIUM LACTATE, POTASSIUM CHLORIDE, CALCIUM CHLORIDE 600; 310; 30; 20 MG/100ML; MG/100ML; MG/100ML; MG/100ML
75 INJECTION, SOLUTION INTRAVENOUS CONTINUOUS
Status: DISCONTINUED | OUTPATIENT
Start: 2020-02-17 | End: 2020-02-17

## 2020-02-17 RX ORDER — CELECOXIB 200 MG/1
200 CAPSULE ORAL ONCE
Status: DISCONTINUED | OUTPATIENT
Start: 2020-02-17 | End: 2020-02-17

## 2020-02-17 RX ORDER — METOCLOPRAMIDE HYDROCHLORIDE 5 MG/ML
10 INJECTION INTRAMUSCULAR; INTRAVENOUS EVERY 6 HOURS PRN
Status: DISCONTINUED | OUTPATIENT
Start: 2020-02-17 | End: 2020-02-18 | Stop reason: HOSPADM

## 2020-02-17 RX ORDER — OXYCODONE HCL 5 MG/5 ML
10 SOLUTION, ORAL ORAL EVERY 4 HOURS PRN
Status: DISCONTINUED | OUTPATIENT
Start: 2020-02-17 | End: 2020-02-18 | Stop reason: HOSPADM

## 2020-02-17 RX ORDER — GLYCOPYRROLATE 0.2 MG/ML
INJECTION INTRAMUSCULAR; INTRAVENOUS AS NEEDED
Status: DISCONTINUED | OUTPATIENT
Start: 2020-02-17 | End: 2020-02-17 | Stop reason: SURG

## 2020-02-17 RX ORDER — LORAZEPAM 2 MG/ML
0.5 INJECTION INTRAMUSCULAR EVERY 6 HOURS PRN
Status: DISCONTINUED | OUTPATIENT
Start: 2020-02-17 | End: 2020-02-18 | Stop reason: HOSPADM

## 2020-02-17 RX ORDER — FLUOXETINE HYDROCHLORIDE 20 MG/1
40 CAPSULE ORAL DAILY
Status: DISCONTINUED | OUTPATIENT
Start: 2020-02-18 | End: 2020-02-18 | Stop reason: HOSPADM

## 2020-02-17 RX ORDER — PRAVASTATIN SODIUM 40 MG
40 TABLET ORAL
Status: DISCONTINUED | OUTPATIENT
Start: 2020-02-17 | End: 2020-02-18 | Stop reason: HOSPADM

## 2020-02-17 RX ORDER — SIMETHICONE 80 MG
80 TABLET,CHEWABLE ORAL 2 TIMES DAILY
Status: DISCONTINUED | OUTPATIENT
Start: 2020-02-17 | End: 2020-02-18 | Stop reason: HOSPADM

## 2020-02-17 RX ORDER — NEOSTIGMINE METHYLSULFATE 1 MG/ML
INJECTION INTRAVENOUS AS NEEDED
Status: DISCONTINUED | OUTPATIENT
Start: 2020-02-17 | End: 2020-02-17 | Stop reason: SURG

## 2020-02-17 RX ORDER — HYDROMORPHONE HCL/PF 1 MG/ML
SYRINGE (ML) INJECTION AS NEEDED
Status: DISCONTINUED | OUTPATIENT
Start: 2020-02-17 | End: 2020-02-17 | Stop reason: SURG

## 2020-02-17 RX ORDER — PROPOFOL 10 MG/ML
INJECTION, EMULSION INTRAVENOUS AS NEEDED
Status: DISCONTINUED | OUTPATIENT
Start: 2020-02-17 | End: 2020-02-17 | Stop reason: SURG

## 2020-02-17 RX ORDER — FENTANYL CITRATE 50 UG/ML
INJECTION, SOLUTION INTRAMUSCULAR; INTRAVENOUS AS NEEDED
Status: DISCONTINUED | OUTPATIENT
Start: 2020-02-17 | End: 2020-02-17 | Stop reason: SURG

## 2020-02-17 RX ORDER — FENTANYL CITRATE/PF 50 MCG/ML
25 SYRINGE (ML) INJECTION
Status: DISCONTINUED | OUTPATIENT
Start: 2020-02-17 | End: 2020-02-17 | Stop reason: HOSPADM

## 2020-02-17 RX ORDER — ONDANSETRON 2 MG/ML
4 INJECTION INTRAMUSCULAR; INTRAVENOUS EVERY 4 HOURS PRN
Status: DISCONTINUED | OUTPATIENT
Start: 2020-02-17 | End: 2020-02-18 | Stop reason: HOSPADM

## 2020-02-17 RX ORDER — SODIUM CHLORIDE, SODIUM LACTATE, POTASSIUM CHLORIDE, CALCIUM CHLORIDE 600; 310; 30; 20 MG/100ML; MG/100ML; MG/100ML; MG/100ML
75 INJECTION, SOLUTION INTRAVENOUS CONTINUOUS
Status: DISCONTINUED | OUTPATIENT
Start: 2020-02-17 | End: 2020-02-18 | Stop reason: HOSPADM

## 2020-02-17 RX ORDER — ACETAMINOPHEN 325 MG/1
975 TABLET ORAL EVERY 6 HOURS SCHEDULED
Status: DISCONTINUED | OUTPATIENT
Start: 2020-02-17 | End: 2020-02-18 | Stop reason: HOSPADM

## 2020-02-17 RX ORDER — MAGNESIUM HYDROXIDE 1200 MG/15ML
LIQUID ORAL AS NEEDED
Status: DISCONTINUED | OUTPATIENT
Start: 2020-02-17 | End: 2020-02-17 | Stop reason: HOSPADM

## 2020-02-17 RX ORDER — PROPOFOL 10 MG/ML
INJECTION, EMULSION INTRAVENOUS CONTINUOUS PRN
Status: DISCONTINUED | OUTPATIENT
Start: 2020-02-17 | End: 2020-02-17 | Stop reason: SURG

## 2020-02-17 RX ORDER — BUPIVACAINE HYDROCHLORIDE 5 MG/ML
INJECTION, SOLUTION PERINEURAL AS NEEDED
Status: DISCONTINUED | OUTPATIENT
Start: 2020-02-17 | End: 2020-02-17 | Stop reason: HOSPADM

## 2020-02-17 RX ORDER — ONDANSETRON 2 MG/ML
INJECTION INTRAMUSCULAR; INTRAVENOUS AS NEEDED
Status: DISCONTINUED | OUTPATIENT
Start: 2020-02-17 | End: 2020-02-17 | Stop reason: SURG

## 2020-02-17 RX ORDER — HYDROMORPHONE HCL/PF 1 MG/ML
1 SYRINGE (ML) INJECTION EVERY 4 HOURS PRN
Status: DISCONTINUED | OUTPATIENT
Start: 2020-02-17 | End: 2020-02-18 | Stop reason: HOSPADM

## 2020-02-17 RX ORDER — SODIUM CHLORIDE 9 MG/ML
INJECTION, SOLUTION INTRAVENOUS CONTINUOUS PRN
Status: DISCONTINUED | OUTPATIENT
Start: 2020-02-17 | End: 2020-02-17 | Stop reason: SURG

## 2020-02-17 RX ORDER — LIDOCAINE HYDROCHLORIDE 40 MG/ML
SOLUTION TOPICAL AS NEEDED
Status: DISCONTINUED | OUTPATIENT
Start: 2020-02-17 | End: 2020-02-17 | Stop reason: SURG

## 2020-02-17 RX ADMIN — PROPOFOL 100 MG: 10 INJECTION, EMULSION INTRAVENOUS at 08:47

## 2020-02-17 RX ADMIN — PROPOFOL 100 MCG/KG/MIN: 10 INJECTION, EMULSION INTRAVENOUS at 07:38

## 2020-02-17 RX ADMIN — SIMETHICONE CHEW TAB 80 MG 80 MG: 80 TABLET ORAL at 21:07

## 2020-02-17 RX ADMIN — SODIUM CHLORIDE, SODIUM LACTATE, POTASSIUM CHLORIDE, AND CALCIUM CHLORIDE 75 ML/HR: .6; .31; .03; .02 INJECTION, SOLUTION INTRAVENOUS at 06:28

## 2020-02-17 RX ADMIN — PANTOPRAZOLE SODIUM 40 MG: 40 INJECTION, POWDER, FOR SOLUTION INTRAVENOUS at 10:32

## 2020-02-17 RX ADMIN — LIDOCAINE HYDROCHLORIDE 100 MG: 20 INJECTION, SOLUTION EPIDURAL; INFILTRATION; INTRACAUDAL; PERINEURAL at 07:35

## 2020-02-17 RX ADMIN — ROCURONIUM BROMIDE 10 MG: 10 INJECTION, SOLUTION INTRAVENOUS at 08:20

## 2020-02-17 RX ADMIN — SODIUM CHLORIDE, SODIUM LACTATE, POTASSIUM CHLORIDE, AND CALCIUM CHLORIDE: .6; .31; .03; .02 INJECTION, SOLUTION INTRAVENOUS at 09:34

## 2020-02-17 RX ADMIN — FENTANYL CITRATE 50 MCG: 50 INJECTION, SOLUTION INTRAMUSCULAR; INTRAVENOUS at 07:35

## 2020-02-17 RX ADMIN — KETOROLAC TROMETHAMINE 15 MG: 30 INJECTION, SOLUTION INTRAMUSCULAR at 23:53

## 2020-02-17 RX ADMIN — DEXAMETHASONE SODIUM PHOSPHATE 4 MG: 4 INJECTION, SOLUTION INTRA-ARTICULAR; INTRALESIONAL; INTRAMUSCULAR; INTRAVENOUS; SOFT TISSUE at 09:26

## 2020-02-17 RX ADMIN — REMIFENTANIL HYDROCHLORIDE 0.2 MCG/KG/MIN: 1 INJECTION, POWDER, LYOPHILIZED, FOR SOLUTION INTRAVENOUS at 07:38

## 2020-02-17 RX ADMIN — HEPARIN SODIUM 5000 UNITS: 5000 INJECTION INTRAVENOUS; SUBCUTANEOUS at 06:38

## 2020-02-17 RX ADMIN — ACETAMINOPHEN 975 MG: 325 TABLET, FILM COATED ORAL at 17:37

## 2020-02-17 RX ADMIN — SODIUM CHLORIDE, SODIUM LACTATE, POTASSIUM CHLORIDE, AND CALCIUM CHLORIDE 75 ML/HR: .6; .31; .03; .02 INJECTION, SOLUTION INTRAVENOUS at 11:50

## 2020-02-17 RX ADMIN — SCOPALAMINE 1 PATCH: 1 PATCH, EXTENDED RELEASE TRANSDERMAL at 06:02

## 2020-02-17 RX ADMIN — MIDAZOLAM HYDROCHLORIDE 2 MG: 1 INJECTION, SOLUTION INTRAMUSCULAR; INTRAVENOUS at 07:27

## 2020-02-17 RX ADMIN — ACETAMINOPHEN 975 MG: 325 TABLET, FILM COATED ORAL at 23:53

## 2020-02-17 RX ADMIN — METRONIDAZOLE 500 MG: 500 INJECTION, SOLUTION INTRAVENOUS at 07:30

## 2020-02-17 RX ADMIN — FAMOTIDINE 20 MG: 10 INJECTION, SOLUTION INTRAVENOUS at 21:07

## 2020-02-17 RX ADMIN — NEOSTIGMINE METHYLSULFATE 4 MG: 1 INJECTION INTRAVENOUS at 09:27

## 2020-02-17 RX ADMIN — PRAVASTATIN SODIUM 40 MG: 40 TABLET ORAL at 17:37

## 2020-02-17 RX ADMIN — FENTANYL CITRATE 25 MCG: 50 INJECTION, SOLUTION INTRAMUSCULAR; INTRAVENOUS at 10:45

## 2020-02-17 RX ADMIN — Medication 0.2 MCG/KG/HR: at 07:38

## 2020-02-17 RX ADMIN — ROCURONIUM BROMIDE 50 MG: 10 INJECTION, SOLUTION INTRAVENOUS at 07:36

## 2020-02-17 RX ADMIN — GLYCOPYRROLATE 0.6 MG: 0.2 INJECTION, SOLUTION INTRAMUSCULAR; INTRAVENOUS at 09:27

## 2020-02-17 RX ADMIN — SODIUM CHLORIDE: 0.9 INJECTION, SOLUTION INTRAVENOUS at 07:33

## 2020-02-17 RX ADMIN — PROPOFOL 200 MG: 10 INJECTION, EMULSION INTRAVENOUS at 07:35

## 2020-02-17 RX ADMIN — ACETAMINOPHEN 975 MG: 325 TABLET, FILM COATED ORAL at 14:42

## 2020-02-17 RX ADMIN — OXYCODONE HYDROCHLORIDE 10 MG: 5 SOLUTION ORAL at 20:53

## 2020-02-17 RX ADMIN — ACETAMINOPHEN 975 MG: 325 TABLET, FILM COATED ORAL at 06:01

## 2020-02-17 RX ADMIN — CEFAZOLIN SODIUM 2000 MG: 2 SOLUTION INTRAVENOUS at 07:25

## 2020-02-17 RX ADMIN — KETOROLAC TROMETHAMINE 15 MG: 30 INJECTION, SOLUTION INTRAMUSCULAR at 17:38

## 2020-02-17 RX ADMIN — PHENYLEPHRINE HYDROCHLORIDE 100 MCG: 10 INJECTION INTRAVENOUS at 08:22

## 2020-02-17 RX ADMIN — GABAPENTIN 600 MG: 300 CAPSULE ORAL at 06:01

## 2020-02-17 RX ADMIN — ROCURONIUM BROMIDE 10 MG: 10 INJECTION, SOLUTION INTRAVENOUS at 08:08

## 2020-02-17 RX ADMIN — ONDANSETRON 4 MG: 2 INJECTION INTRAMUSCULAR; INTRAVENOUS at 09:26

## 2020-02-17 RX ADMIN — LIDOCAINE HYDROCHLORIDE 1 APPLICATION: 40 SOLUTION TOPICAL at 09:39

## 2020-02-17 RX ADMIN — SIMETHICONE CHEW TAB 80 MG 80 MG: 80 TABLET ORAL at 14:45

## 2020-02-17 RX ADMIN — HYDROMORPHONE HYDROCHLORIDE 1 MG: 1 INJECTION, SOLUTION INTRAMUSCULAR; INTRAVENOUS; SUBCUTANEOUS at 09:21

## 2020-02-17 RX ADMIN — ROCURONIUM BROMIDE 10 MG: 10 INJECTION, SOLUTION INTRAVENOUS at 08:34

## 2020-02-17 RX ADMIN — FENTANYL CITRATE 25 MCG: 50 INJECTION, SOLUTION INTRAMUSCULAR; INTRAVENOUS at 10:53

## 2020-02-17 RX ADMIN — FENTANYL CITRATE 50 MCG: 50 INJECTION, SOLUTION INTRAMUSCULAR; INTRAVENOUS at 08:29

## 2020-02-17 NOTE — ANESTHESIA POSTPROCEDURE EVALUATION
Post-Op Assessment Note    CV Status:  Stable  Pain Score: 0    Pain management: adequate     Mental Status:  Alert and awake   Hydration Status:  Stable   PONV Controlled:  Controlled   Airway Patency:  Patent and adequate   Post Op Vitals Reviewed: Yes      Staff: CRNA           BP      Temp     Pulse     Resp      SpO2

## 2020-02-17 NOTE — OP NOTE
OPERATIVE REPORT  PATIENT NAME: Yazmin John    :  1960  MRN: [id number]  Pt Location: [facility] OR ROOM 02    SURGERY DATE: 2020    Surgeon(s) and Role:     * Leana Petersen MD - Primary     * Charu Ocampo PA-C - Assisting    Preop Diagnosis:  Morbid obesity ([address] ) [E66 01]  Mixed hyperlipidemia [E78 2]  Obstructive sleep apnea [G47 33]    Post-Op Diagnosis Codes:     * Morbid obesity ([address] ) [E66 01]     * Mixed hyperlipidemia [E78 2]     * Obstructive sleep apnea [G47 33]    Procedure(s) (LRB):  LAPAROSCOPIC HEIDI-EN-Y GASTRIC BYPASS (N/A)    Specimen(s):  * No specimens in log *    Estimated Blood Loss:   50 ml    BMI: 38 54 kg/m2    Drains:  * No LDAs found *    Anesthesia Type:   General    Operative Indications: Morbid obesity ([address] ) [E66 01]  Mixed hyperlipidemia [E78 2]  Obstructive sleep apnea [G47 33]      Operative Findings:  Small hiatal hernia    Complications:   None    Procedure and Technique:  The patient was identified by name, armband and conversation  The patient was then brought to the operative theatre  After successful induction of general anesthesia, the patient was prepped and draped in the usual sterile fashion  A timeout was performed and all were in agreement  Optiview technique was used to gain entrance into the abdomen with a 12 mm 0 degree laparoscope in the left upper quadrant  Four 12 mm ports were then placed in the right upper quadrant, right lower quadrant, left lower quadrant and umbilicus  A 5 mm epigastric liver retractor was placed  Four quadrant Exparel/Marcaine transversus abdominus plane block was performed  The omentum was split using ultrasonic sophie  Starting at the ligament of treitz 50 cm of small bowel was counted and divided with Medtronic stapler, white load  Another 160 cm of small bowel was counted from here and a stapled jejunojejunostomy was created with the Medtronic stapler, white load  The enterotomy was closed with a running 3-0 PDS   The mesenteric defect was then closed with a running 2-0 ethibond  The gastroesophageal fat pad was dissected  Pars flaccida tecnique was used to enter the lesser sac 5 cm distal to the gastroesophageal junction  Gastric pouch was then created with one horizontal firing and two vertical firings with the Medtronic stapler, purple load with SeamGuard attached   A gastrotomy was created at the greater curve corner of the pouch and the nasogastric tube was passed until the anvil was exposed and secure  An enterotomy was created and the EEA stapler advanced into the small bowel, releasing the spike  A stapled gastrojejunostomy was then created with the 25 mm EEA stapler  The stapler was incidentally left open when coming out of the abdomen  It was reintroduced, closed, then removed from the abdomen  The mesentery of the small bowel was divided with ultrasonic sophie and 10 mm small bowel was divided with Medtronic stapler, white load, making the nevaeh limb 150 cm  Single interrupted 3-0 PDS sutures were placed at the 3, 12 and 9 o'clock positions at the gastrojejunostomy  The endoscope was then advanced past the posterior pharynx into the nevaeh-limb  Air leak saline bubble test was negative and the anastomosis was hemostatic  Nieves's space was then closed with a running 2-0 ethibond suture  At the last stitch a mesenteric hematoma was created  The apical stitch was cut and this hematoma was suture ligated with 0 ethibond  The small bowel was retrieved from the left lower quadrant port with and endocatch bag  The left lower quadrant and umbilical ports were then closed with transfascial 0 vicryl sutures  All port sites were examined for bleeding as we exited the abdomen to ensure hemostasis  The left lower quadrant port site was copiously irrigated  Port sites were then closed with monocryl and dermabond  All instrument, sponge and needle counts were correct        I was present for the entire procedure, A qualified resident physician was not available and an assistant was required during the procedure for retraction tissue handling,dissection and suturing, traction/countertraction and stapling      Patient Disposition:  PACU     SIGNATURE: Kemal Soriano MD  DATE: February 17, 2020  TIME: 9:21 AM

## 2020-02-17 NOTE — H&P
Patient seen and examined by me  H&P updated  Original H&P on paper in chart      Gena Mejia MD  2/17/2020  7:00 AM

## 2020-02-18 ENCOUNTER — TRANSITIONAL CARE MANAGEMENT (OUTPATIENT)
Dept: FAMILY MEDICINE CLINIC | Facility: CLINIC | Age: 60
End: 2020-02-18

## 2020-02-18 VITALS
BODY MASS INDEX: 38.33 KG/M2 | WEIGHT: 224.5 LBS | HEART RATE: 72 BPM | TEMPERATURE: 98.5 F | RESPIRATION RATE: 18 BRPM | DIASTOLIC BLOOD PRESSURE: 85 MMHG | OXYGEN SATURATION: 96 % | SYSTOLIC BLOOD PRESSURE: 140 MMHG | HEIGHT: 64 IN

## 2020-02-18 LAB
ANION GAP SERPL CALCULATED.3IONS-SCNC: 7 MMOL/L (ref 4–13)
BUN SERPL-MCNC: 10 MG/DL (ref 5–25)
CALCIUM SERPL-MCNC: 8.8 MG/DL (ref 8.3–10.1)
CHLORIDE SERPL-SCNC: 98 MMOL/L (ref 100–108)
CO2 SERPL-SCNC: 26 MMOL/L (ref 21–32)
CREAT SERPL-MCNC: 0.75 MG/DL (ref 0.6–1.3)
ERYTHROCYTE [DISTWIDTH] IN BLOOD BY AUTOMATED COUNT: 12.8 % (ref 11.6–15.1)
GFR SERPL CREATININE-BSD FRML MDRD: 89 ML/MIN/1.73SQ M
GLUCOSE SERPL-MCNC: 127 MG/DL (ref 65–140)
HCT VFR BLD AUTO: 40.4 % (ref 34.8–46.1)
HGB BLD-MCNC: 13.3 G/DL (ref 11.5–15.4)
MCH RBC QN AUTO: 30 PG (ref 26.8–34.3)
MCHC RBC AUTO-ENTMCNC: 32.9 G/DL (ref 31.4–37.4)
MCV RBC AUTO: 91 FL (ref 82–98)
PLATELET # BLD AUTO: 224 THOUSANDS/UL (ref 149–390)
PMV BLD AUTO: 13 FL (ref 8.9–12.7)
POTASSIUM SERPL-SCNC: 4.2 MMOL/L (ref 3.5–5.3)
RBC # BLD AUTO: 4.44 MILLION/UL (ref 3.81–5.12)
SODIUM SERPL-SCNC: 131 MMOL/L (ref 136–145)
WBC # BLD AUTO: 16.78 THOUSAND/UL (ref 4.31–10.16)

## 2020-02-18 PROCEDURE — 94760 N-INVAS EAR/PLS OXIMETRY 1: CPT

## 2020-02-18 PROCEDURE — 85027 COMPLETE CBC AUTOMATED: CPT | Performed by: SURGERY

## 2020-02-18 PROCEDURE — 80048 BASIC METABOLIC PNL TOTAL CA: CPT | Performed by: SURGERY

## 2020-02-18 PROCEDURE — NC001 PR NO CHARGE: Performed by: SURGERY

## 2020-02-18 PROCEDURE — 99024 POSTOP FOLLOW-UP VISIT: CPT | Performed by: SURGERY

## 2020-02-18 RX ORDER — ACETAMINOPHEN 325 MG/1
975 TABLET ORAL EVERY 8 HOURS SCHEDULED
Qty: 27 TABLET | Refills: 0
Start: 2020-02-18 | End: 2020-02-21

## 2020-02-18 RX ORDER — ONDANSETRON 4 MG/1
4 TABLET, ORALLY DISINTEGRATING ORAL EVERY 6 HOURS PRN
Qty: 20 TABLET | Refills: 0 | Status: SHIPPED | OUTPATIENT
Start: 2020-02-18 | End: 2020-05-20 | Stop reason: ALTCHOICE

## 2020-02-18 RX ORDER — ECHINACEA PURPUREA EXTRACT 125 MG
1 TABLET ORAL
Status: DISCONTINUED | OUTPATIENT
Start: 2020-02-18 | End: 2020-02-18 | Stop reason: HOSPADM

## 2020-02-18 RX ADMIN — FAMOTIDINE 20 MG: 10 INJECTION, SOLUTION INTRAVENOUS at 08:16

## 2020-02-18 RX ADMIN — SIMETHICONE CHEW TAB 80 MG 80 MG: 80 TABLET ORAL at 08:34

## 2020-02-18 RX ADMIN — KETOROLAC TROMETHAMINE 15 MG: 30 INJECTION, SOLUTION INTRAMUSCULAR at 05:04

## 2020-02-18 RX ADMIN — SALINE NASAL SPRAY 1 SPRAY: 1.5 SOLUTION NASAL at 08:27

## 2020-02-18 RX ADMIN — METOCLOPRAMIDE 10 MG: 5 INJECTION, SOLUTION INTRAMUSCULAR; INTRAVENOUS at 08:26

## 2020-02-18 RX ADMIN — ACETAMINOPHEN 975 MG: 325 TABLET, FILM COATED ORAL at 06:24

## 2020-02-18 RX ADMIN — SODIUM CHLORIDE, SODIUM LACTATE, POTASSIUM CHLORIDE, AND CALCIUM CHLORIDE 75 ML/HR: .6; .31; .03; .02 INJECTION, SOLUTION INTRAVENOUS at 01:33

## 2020-02-18 RX ADMIN — FLUOXETINE 40 MG: 20 CAPSULE ORAL at 08:26

## 2020-02-18 RX ADMIN — KETOROLAC TROMETHAMINE 15 MG: 30 INJECTION, SOLUTION INTRAMUSCULAR at 11:02

## 2020-02-18 NOTE — DISCHARGE SUMMARY
Discharge Summary - Cherelle Cruz 64 y o  female MRN: [id number]    Unit/Bed#: [address] [place] [address] Encounter: [id number]      Pre-Operative Diagnosis: Pre-Op Diagnosis Codes:     * Morbid obesity ([address] ) [E66 01]     * Mixed hyperlipidemia [E78 2]     * Obstructive sleep apnea [G47 33]    Post-Operative Diagnosis: Post-Op Diagnosis Codes:     * Morbid obesity ([address] ) [E66 01]     * Mixed hyperlipidemia [E78 2]     * Obstructive sleep apnea [G47 33]    Procedures Performed:  Procedure(s):  LAPAROSCOPIC HEIDI-EN-Y GASTRIC BYPASS    Surgeon: Gilford Cheers, MD    See H & P for full details of admission and Operative Note for full details of operations performed  Hospital Course:  Patient was admitted for a Laparoscopic Heidi-En-Y Gastric Bypass  Post operatively pain was controlled with oral analgesics and the patient is ambulating/micturating without difficulty  Vital signs and lab work were stable  The patient is tolerating clear liquid diet without nausea or vomiting  The patient is cleared for D/C by the surgeon on POD1  Patient was seen and examined prior to discharge  Provisions for Follow-Up Care:  See After Visit Summary for information related to follow-up care and home orders  Disposition: Home, in stable condition  Patient should refer to "Discharge Instructions" for further information  Planned Readmission: No    Discharge Medications:  See After Visit Summary for reconciled discharge medications provided to patient and family  Post Operative instructions: Reviewed with patient and/or family  This text is generated with voice recognition software  There may be translation, syntax,  or grammatical errors  If you have any questions, please contact the dictating provider       Signature:   Ina Sanz PA-C  Date: 2/18/2020 Time: 8:32 AM

## 2020-02-18 NOTE — PLAN OF CARE
Problem: PAIN - ADULT  Goal: Verbalizes/displays adequate comfort level or baseline comfort level  Description  Interventions:  - Encourage patient to monitor pain and request assistance  - Assess pain using appropriate pain scale  - Administer analgesics based on type and severity of pain and evaluate response  - Implement non-pharmacological measures as appropriate and evaluate response  - Consider cultural and social influences on pain and pain management  - Notify physician/advanced practitioner if interventions unsuccessful or patient reports new pain  Outcome: Progressing     Problem: INFECTION - ADULT  Goal: Absence or prevention of progression during hospitalization  Description  INTERVENTIONS:  - Assess and monitor for signs and symptoms of infection  - Monitor lab/diagnostic results  - Monitor all insertion sites, i e  indwelling lines, tubes, and drains  - Administer medications as ordered  - Instruct and encourage patient and family to use good hand hygiene technique  - Identify and instruct in appropriate isolation precautions for identified infection/condition   Outcome: Progressing     Problem: Knowledge Deficit  Goal: Patient/family/caregiver demonstrates understanding of disease process, treatment plan, medications, and discharge instructions  Description  Complete learning assessment and assess knowledge base    Interventions:  - Provide teaching at level of understanding  - Provide teaching via preferred learning methods  Outcome: Progressing

## 2020-02-18 NOTE — PROGRESS NOTES
Progress Note - Bariatric Surgery   Tracey Ricci 64 y o  female MRN: [id number]  Unit/Bed#: 2 [place] [address] Encounter: [id number]      Subjective/Objective     Subjective: Patient with morbid obesity s/p lap Cornelio-en-Y Gastric Bypass POD1  Tolerating liquid diet without nausea or vomiting, pain adequately controlled on oral pain medication, ambulating without assistance, voiding well, using incentive spirometer  Feeling mildly congested  Passing flatus and had a well formed BM this morning  Denies fevers, chills, sweats, SOB, CP, calf pain  Objective:    /80 (BP Location: Left arm)   Pulse 73   Temp 98 5 °F (36 9 °C)   Resp 18   Ht 5' 4" (1 626 m)   Wt 102 kg (224 lb 8 oz)   SpO2 96%   BMI 38 54 kg/m²       Intake/Output Summary (Last 24 hours) at 2/18/2020 0825  Last data filed at 2/17/2020 1037  Gross per 24 hour   Intake 1350 ml   Output 50 ml   Net 1300 ml       Invasive Devices     Peripheral Intravenous Line            Peripheral IV 02/17/20 Left Hand 1 day    Peripheral IV 02/17/20 Right Forearm 1 day                ROS: 10-point system completed  All negative except see HPI  Physical Exam    General Appearance:    Alert, cooperative, no distress, appears stated age   Head:    Normocephalic, without obvious abnormality, atraumatic   Lungs:     Clear to auscultation bilaterally, respirations unlabored   Heart:    Regular rate and rhythm, S1 and S2 normal, no murmur, rub    or gallop   Abdomen:     Soft, appropriate tenderness, bowel sounds active all four quadrants, non distended, incisions clean, dry, and intact   Extremities:   Extremities normal, atraumatic, no cyanosis or edema   Neurologic:   CNII-XII intact  Normal strength and sensation               Lab, Imaging and other studies:  I have personally reviewed pertinent lab results    , CBC:   Lab Results   Component Value Date    WBC 16 78 (H) 02/18/2020    HGB 13 3 02/18/2020    HCT 40 4 02/18/2020    MCV 91 02/18/2020     02/18/2020    MCH 30 0 02/18/2020    MCHC 32 9 02/18/2020    RDW 12 8 02/18/2020    MPV 13 0 (H) 02/18/2020   , CMP:   Lab Results   Component Value Date    SODIUM 131 (L) 02/18/2020    K 4 2 02/18/2020    CL 98 (L) 02/18/2020    CO2 26 02/18/2020    BUN 10 02/18/2020    CREATININE 0 75 02/18/2020    CALCIUM 8 8 02/18/2020    EGFR 89 02/18/2020        VTE Mechanical Prophylaxis: sequential compression device    Assessment/Plan  1)  Morbid Obesity s/p lap Cornelio-en-Y Gastric Bypass POD1 with stable post op course  Noted mild hyponatremia; renal function stable  Encourage PO fluids, ambulation, and incentive spirometry  If patient continues to tolerate adequate PO fluids will plan for D/C this afternoon  2) Depression - continue Prozac and f/u with prescribing provider/counselor    3) HLD - continue crestor     4) ADRIANA - continue CPAP    Plan of care was discussed with patient and patient's nurse  Care plan discussed with Dr Leena Jarvis  Dispo: Continue bariatric clear liquid diet, ambulation, incentive spirometry           Isabella Garrido PA-C  2/18/2020  8:26 AM

## 2020-02-18 NOTE — DISCHARGE INSTRUCTIONS
Bariatric/Weight Loss Surgery  Hospital Discharge Instructions  1  ACTIVITY:  a  Progress as feels comfortable - a good rule is:  if you are doing something and it begins to hurt, stop doing the activity  Walk every hour while at home  b  Relayr Divers may walk stairs if you do so slowly  c  You may shower 48 hours after surgery  d  Use your incentive spirometer 10 times per hour while awake for 1 week  e  Do NOT drive for 48 hours after surgery  No driving 24 hours after taking certain prescription pain medications   Examples of such medication are Percocet, Darvocet, Oxycodone, Tylenol #3, and Tylenol with Codeine  Follow your pharmacists orders  2  DIET  a  Stay on a liquid diet for 7 days after your surgery date, sipping slowly  Refer to your manual for examples of choices  Remember to keep your liquids sugar free or low calorie  You may have protein drinks  Make sure to drink 48 to 64 ounces per day of fluids  b  Relayr Divers may advance to a pureed diet one week after surgery as instructed by your diet progression pamphlet  Once you get approval from your surgeon at your first post operative visit you may advance to the soft diet  3  MEDICATIONS:  a  The abdominal nerve block will wear off during the first 1-2 days that you are home, and you may become sore  Continue to take your Tylenol and your pain medication as instructed  b  Start vitamins and minerals when you get home  c  Anti-acid Medication as per prescription  d  Other medications as indicated on the Physician Patient Discharge Instructions form given to you at the time of discharge  e  Make sure that you are splitting your pill or tablet medications in halves or fourths or even crushing them before you take them  Capsules should be opened and mixed with water or jello  You need to do this for at least 4 weeks after surgery  Eventually you will be able to take your medications the regular way as they were prescribed     f  Relayr Divers will need to consult with your Family Doctor in regards to all your prescribed medication, particularly those for blood pressure and diabetes  As you lose weight, medical conditions may change, requiring an alteration or elimination of the drug dose  g  DO NOT TAKE BIRTH CONTROL(BC) MEDICATIONS, INSERT BC VAGINAL RINGS, OR PLACE IUD OR ANY OTHER BC METHODS UNTIL 31 DAYS FROM DAY OF DISCHARGE FROM HOSPITAL  THIS PLACES YOU AT HIGH RISK FOR A POTENTIALLY LIFE THREATENING BLOOD CLOT  Remember to always use barrier methods for birth control and speak to your GYN about using two forms of birth control to start 31 days after surgery  It is very important to avoid pregnancy until at least 18-24 months after surgery  4  INCISION CARE  a  You may shower and get incisions wet 2 days after surgery  No soaking tub baths or swimming for 30 days after surgery  Keep abdominal area and incisions clean  Use soap and water to create a good lather and rinse off  Do not scrub incisions  b  If you have a drain, empty the drain as the nurses instructed  5  FOLLOW-UP APPOINTMENT should be made for one week after discharge  Call surgeons office at [phone] to schedule an appointment  6  CALL YOUR DOCTOR FOR:  pain not controlled by pain medications, a temperature greater than 101 5° F, any increase or change in drainage or redness from any incision, any vomiting or inability to keep liquids down, shortness of breath, shoulder pain, or bleeding      Additional Information for Providers and Patients                      Vitamins After Cornelio en Y Gastric Bypass or Sleeve Gastrectomy Surgery    Due to the decreased absorption of nutrients and the decreased amount of food eaten it is difficult to obtain all the nutrients needed consuming food  We recommend a bariatric formulated vitamin for the rest of your life  If you wish to use an over the counter vitamins please understand you may not get all the recommended daily requirements    Use the following guidelines for over the counter vitamins  Multivitamins    We recommend 2 chewable multivitamins with iron (do not take gummy chewable as they do not contain thiamine)     You can continue with the chewable or take any well formulated, high potency multivitamin containing 22 nutrients including zinc and copper   If you decide to take a bariatric vitamin the number of vitamins that you need to take will vary  Refer to the chart provided at team meeting    Calcium - Calcium is absorbed in the part of the small bowel that is bypassed in gastric bypass patients  In addition, as you lose weight, you are more at risk for loss of bone density leading to osteoporosis   The best form of calcium is Calcium Citrate  This form of calcium is better absorbed after your surgery    Recommended daily dose is 1500 mg  Take 500 mg in (3) divided doses  You can only absorb about 500 mg at a time   We recommend 2000 IU of vitamin D3 per day, in addition to what is in your calcium supplement  If you were instructed to take a higher dose based on a deficiency, then continue to take the higher vitamin D dose  Iron - Iron is absorbed in the part of the small bowel that is bypassed   You will need to take extra iron in addition to what is already in the multivitamin if you are a menstruating woman (25-45 mg of additional elemental iron) or have been diagnosed with an iron deficiency   We recommend iron in the form of Ferrous Fumarate with Vitamin C    Follow the instructions on the package or bottle unless your physician has given other dosage amounts  B12 (Cyanocobalamin) may also be decreased   Additional Vitamin B12 is recommended if you are not taking a bariatric vitamin   Take 350 to 500 micrograms (mcg) per day of B12 (Cyanocobalamin) in a sublingual form (for under the tongue)      Note:  Calcium interferes with the absorption of iron, so it is recommended that you take the calcium at least 2 hours apart from iron  The tannins in tea also interfere with the absorption of iron   Note: Anti-ulcer medications interfere with the absorption of calcium iron and B12  Space your anti-ulcer medication 2 hours apart from your vitamins  * Based on the recommendations of the ASMBS and the National Osteoporosis foundation    Non-steroidal anti-inflammatory drugs or medications containing them  You should take caution or avoid these medications as they could harm your pouch or sleeve  **This is a sample list and is not all inclusive  Please read labels carefully  **      Non Steroidal anti-inflammatory drugs  Advil (ibuprofen)  Aleve (naproxen)  Anaprox (naproxen)  Ansaid (flurbiprofen)  Azolid (phenylbutazone)  Bextra (valdecoxib)  Butazolidin (phenylbutazone)  Celebrex (celecoxib)  Clinoril (sulindac)  Dolobid (diflunisal)  Excedrin IB (ibuprofen)  Feldene (piroxicam)  Ibuprin (ibuprofen)  Indocin (indomethacin)  Lodine (etodolac)  Meclomen (meclofenamate)  Midol IB (ibuprofen)  Motrin IB (ibuprofen)  Nalfon (fenoprofen)  Naprosyn (naproxen)  Nuprin (ibuprofen)  Orudis (ketoprofen)  Oruvail (ketoprofen)  Pamprin - IB (ibuprofen)  Ponstel (mefenamic acid)  Rexolate (sodium thiosalicylate)  Tandearil (oxyphenbutazone)  Tolectin (tolmetin)  Voltaren (diclofenac)      Barbiturate  Fiorinal (butalbital/aspirin/caffeine)    Salicylates  Amigesic (salsalate)  Anacin (aspirin)  Arthropan (choline salicylate)  Ascriptin (buffered aspirin)  Aspirin (aspirin)  Aspirtab (aspirin)  Bufferin (buffered aspirin)  Disalcid (salsalate)  Ecotrin (aspirin)  Uracel (sodium salicylate)    Analgesics  Equagesic (meprobamate/aspirin)  Micrainin (meprobamate/aspirin)  Percodan (oxycodone/aspirin)    OTC  Pepto-Bismol®  Patricia-Dim[place]ale®  Excedrin®    For Gastric Bypass Patients  Extended Release Medications  Sustained Release Medications  Time Released Medications

## 2020-02-18 NOTE — NURSING NOTE
AVS summary given and reviewed  Bariatric nurses also did the education on specifics for her  IV access removed and pt  Tolerated Will leave the floor on foot with her personal belonging and  by her side

## 2020-02-19 ENCOUNTER — TELEPHONE (OUTPATIENT)
Dept: BARIATRICS | Facility: CLINIC | Age: 60
End: 2020-02-19

## 2020-02-19 NOTE — UTILIZATION REVIEW
Initial Clinical Review    Elective  surgical procedure  Age/Sex: 64 y o  female  Surgery Date: 2/17/20  Procedure: LAPAROSCOPIC HEIDI-EN-Y GASTRIC BYPASS (N/A)  Anesthesia: general  Operative Findings: Small hiatal hernia  POD#1 Progress Note:   Admission Orders: Date/Time/Statement: Admission Orders (From admission, onward)     Ordered        02/17/20 1117  Inpatient Admission  Once                   Orders Placed This Encounter   Procedures    Inpatient Admission     Standing Status:   Standing     Number of Occurrences:   1     Order Specific Question:   Admitting Physician     Answer:   Shanon Bruce     Order Specific Question:   Level of Care     Answer:   Med Surg [16]     Order Specific Question:   Estimated length of stay     Answer:   Inpatient Only Surgery     Vital Signs: /85 (BP Location: Left arm)   Pulse 72   Temp 98 5 °F (36 9 °C)   Resp 18   Ht 5' 4" (1 626 m)   Wt 102 kg (224 lb 8 oz)   SpO2 96%   BMI 38 54 kg/m²   Diet: bariatric clear liquid  Mobility: ambulate  DVT Prophylaxis: heparin sq  Medications/Pain Control: oxycodone 10mg x1  [email or web address]  Iv Ancef , flagyl x1 dose  Mylicon 11YT bid  Scopolamine patch 72hr  Pravastatin 40 qd  Iv protonix 40mg x1  Iv toradol 15mg q6hr atc  neurontin 600mg x1  prozac qd  pepcid iv 20mg q12h  Tylenol 975 q6hr atc  Network Utilization Review Department  [email or web address]  ATTENTION: Please call with any questions or concerns to [phone] and carefully listen to the prompts so that you are directed to the right person  All voicemails are confidential   Sarah Machuca all requests for admission clinical reviews, approved or denied determinations and any other requests to dedicated fax number below belonging to the campus where the patient is receiving treatment   List of dedicated fax numbers for the Facilities:  FACILITY NAME UR FAX NUMBER   ADMISSION DENIALS (Administrative/Medical Necessity) [phone]   [facility] [address] (Maternity/NICU/Pediatrics) [place] [phone]   [facility] [phone]   [facility] [phone]   Guille Wynne [address] [phone]   [facility]  [phone]   [address] [facility] [address] [facility]  [address] [facility] [address] [facility] [phone]

## 2020-02-19 NOTE — TELEPHONE ENCOUNTER
Post op follow up phone call completed  Pt is sipping liquids, using IS as instructed, reinforced importance of using IS to help prevent pneumonia  Ambulating about home without difficulty  Pain controlled with analgesia  She does continue to complain of headache which tylenol does not completely relieve  Also has nasal congestion and is seeing family   tomorrow  Denies fever  Reaffirmed examples of liquid diet over the next week  Pt stated understanding about discharge instructions and medication adjustments  Follow up appt with surgeon scheduled for next week  Instructed to call with any additional questions or concerns

## 2020-02-20 ENCOUNTER — OFFICE VISIT (OUTPATIENT)
Dept: FAMILY MEDICINE CLINIC | Facility: CLINIC | Age: 60
End: 2020-02-20
Payer: COMMERCIAL

## 2020-02-20 VITALS
WEIGHT: 213 LBS | OXYGEN SATURATION: 98 % | TEMPERATURE: 97.4 F | SYSTOLIC BLOOD PRESSURE: 128 MMHG | BODY MASS INDEX: 36.37 KG/M2 | RESPIRATION RATE: 18 BRPM | HEART RATE: 84 BPM | DIASTOLIC BLOOD PRESSURE: 80 MMHG | HEIGHT: 64 IN

## 2020-02-20 DIAGNOSIS — F32.A ANXIETY AND DEPRESSION: ICD-10-CM

## 2020-02-20 DIAGNOSIS — D72.829 LEUKOCYTOSIS, UNSPECIFIED TYPE: ICD-10-CM

## 2020-02-20 DIAGNOSIS — E78.2 MIXED HYPERLIPIDEMIA: ICD-10-CM

## 2020-02-20 DIAGNOSIS — Z98.84 S/P BARIATRIC SURGERY: ICD-10-CM

## 2020-02-20 DIAGNOSIS — J01.00 ACUTE NON-RECURRENT MAXILLARY SINUSITIS: ICD-10-CM

## 2020-02-20 DIAGNOSIS — F41.9 ANXIETY AND DEPRESSION: ICD-10-CM

## 2020-02-20 DIAGNOSIS — E66.01 SEVERE OBESITY (BMI 35.0-39.9) WITH COMORBIDITY (HCC): Primary | ICD-10-CM

## 2020-02-20 DIAGNOSIS — R00.2 PALPITATIONS: ICD-10-CM

## 2020-02-20 PROCEDURE — 99214 OFFICE O/P EST MOD 30 MIN: CPT | Performed by: FAMILY MEDICINE

## 2020-02-20 PROCEDURE — 93000 ELECTROCARDIOGRAM COMPLETE: CPT | Performed by: FAMILY MEDICINE

## 2020-02-20 PROCEDURE — 1111F DSCHRG MED/CURRENT MED MERGE: CPT | Performed by: FAMILY MEDICINE

## 2020-02-20 RX ORDER — FLUOXETINE HYDROCHLORIDE 40 MG/1
40 CAPSULE ORAL DAILY
Start: 2020-02-20 | End: 2020-07-10 | Stop reason: SDUPTHER

## 2020-02-20 RX ORDER — AMOXICILLIN AND CLAVULANATE POTASSIUM 400; 57 MG/5ML; MG/5ML
800 POWDER, FOR SUSPENSION ORAL 2 TIMES DAILY
Qty: 100 ML | Refills: 0 | Status: SHIPPED | OUTPATIENT
Start: 2020-02-20 | End: 2020-03-01

## 2020-02-20 NOTE — UTILIZATION REVIEW
Notification of Discharge  This is a Notification of Discharge from our facility [address]  Please be advised that this patient has been discharge from our facility  Below you will find the admission and discharge date and time including the patients disposition  PRESENTATION DATE: 2/17/2020  5:30 AM  OBS ADMISSION DATE:   IP ADMISSION DATE: 2/17/20 1117   DISCHARGE DATE: 2/18/2020 12:13 PM  DISPOSITION: Home/Self Care Home/Self Care   Admission Orders listed below:  Admission Orders (From admission, onward)     Ordered        02/17/20 1117  Inpatient Admission  Once                   Please contact the UR Department if additional information is required to close this patient's authorization/case  [facility]  Network Utilization Review Department  Main: [phone] x carefully listen to the prompts  All voicemails are confidential   [email or web address]  Send all requests for admission clinical reviews, approved or denied determinations and any other requests to dedicated fax number below belonging to the campus where the patient is receiving treatment   List of dedicated fax numbers:  [address] DENIALS (Administrative/Medical Necessity) [phone]   [address] (Maternity/NICU/Pediatrics) [phone]   Aubrey Mary [phone]   Guillermo Becket [phone]   Beatriz Carton [phone]   [address] [phone]   [facility]  [phone]   [address] [facility]  [address] [place] [address] [address] [facility] [phone]

## 2020-02-20 NOTE — PATIENT INSTRUCTIONS
Obesity   AMBULATORY CARE:   Obesity  is when your body mass index (BMI) is greater than 30  Your healthcare provider will use your height and weight to measure your BMI  The risks of obesity include  many health problems, such as injuries or physical disability  You may need tests to check for the following:  · Diabetes     · High blood pressure or high cholesterol     · Heart disease     · Gallbladder or liver disease     · Cancer of the colon, breast, prostate, liver, or kidney     · Sleep apnea     · Arthritis or gout  Seek care immediately if:   · You have a severe headache, confusion, or difficulty speaking  · You have weakness on one side of your body  · You have chest pain, sweating, or shortness of breath  Contact your healthcare provider if:   · You have symptoms of gallbladder or liver disease, such as pain in your upper abdomen  · You have knee or hip pain and discomfort while walking  · You have symptoms of diabetes, such as intense hunger and thirst, and frequent urination  · You have symptoms of sleep apnea, such as snoring or daytime sleepiness  · You have questions or concerns about your condition or care  Treatment for obesity  focuses on helping you lose weight to improve your health  Even a small decrease in BMI can reduce the risk for many health problems  Your healthcare provider will help you set a weight-loss goal   · Lifestyle changes  are the first step in treating obesity  These include making healthy food choices and getting regular physical activity  Your healthcare provider may suggest a weight-loss program that involves coaching, education, and therapy  · Medicine  may help you lose weight when it is used with a healthy diet and physical activity  · Surgery  can help you lose weight if you are very obese and have other health problems  There are several types of weight-loss surgery  Ask your healthcare provider for more information    Be successful losing weight:   · Set small, realistic goals  An example of a small goal is to walk for 20 minutes 5 days a week  Anther goal is to lose 5% of your body weight  · Tell friends, family members, and coworkers about your goals  and ask for their support  Ask a friend to lose weight with you, or join a weight-loss support group  · Identify foods or triggers that may cause you to overeat , and find ways to avoid them  Remove tempting high-calorie foods from your home and workplace  Place a bowl of fresh fruit on your kitchen counter  If stress causes you to eat, then find other ways to cope with stress  · Keep a diary to track what you eat and drink  Also write down how many minutes of physical activity you do each day  Weigh yourself once a week and record it in your diary  Eating changes: You will need to eat 500 to 1,000 fewer calories each day than you currently eat to lose 1 to 2 pounds a week  The following changes will help you cut calories:  · Eat smaller portions  Use small plates, no larger than 9 inches in diameter  Fill your plate half full of fruits and vegetables  Measure your food using measuring cups until you know what a serving size looks like  · Eat 3 meals and 1 or 2 snacks each day  Plan your meals in advance  Heaven Adamson and eat at home most of the time  Eat slowly  · Eat fruits and vegetables at every meal   They are low in calories and high in fiber, which makes you feel full  Do not add butter, margarine, or cream sauce to vegetables  Use herbs to season steamed vegetables  · Eat less fat and fewer fried foods  Eat more baked or grilled chicken and fish  These protein sources are lower in calories and fat than red meat  Limit fast food  Dress your salads with olive oil and vinegar instead of bottled dressing  · Limit the amount of sugar you eat  Do not drink sugary beverages  Limit alcohol  Activity changes:  Physical activity is good for your body in many ways   It helps you burn calories and build strong muscles  It decreases stress and depression, and improves your mood  It can also help you sleep better  Talk to your healthcare provider before you begin an exercise program   · Exercise for at least 30 minutes 5 days a week  Start slowly  Set aside time each day for physical activity that you enjoy and that is convenient for you  It is best to do both weight training and an activity that increases your heart rate, such as walking, bicycling, or swimming  · Find ways to be more active  Do yard work and housecleaning  Walk up the stairs instead of using elevators  Spend your leisure time going to events that require walking, such as outdoor festivals or fairs  This extra physical activity can help you lose weight and keep it off  Follow up with your healthcare provider as directed: You may need to meet with a dietitian  Write down your questions so you remember to ask them during your visits  © 2017 [address] Information is for End User's use only and may not be sold, redistributed or otherwise used for commercial purposes  All illustrations and images included in CareNotes® are the copyrighted property of A D A M [employer] Inc  or Сергей Hardwick  The above information is an  only  It is not intended as medical advice for individual conditions or treatments  Talk to your doctor, nurse or pharmacist before following any medical regimen to see if it is safe and effective for you

## 2020-02-20 NOTE — PROGRESS NOTES
Assessment/Plan:    1  Severe obesity (BMI 35 0-39  9) with comorbidity ([facility] [address] )    2  BMI 36 0-36 9,adult    3  S/P bariatric surgery    4  Mixed hyperlipidemia  Assessment & Plan:  Cont crestor for now will redraw in 3 months      5  Anxiety and depression  -     FLUoxetine (PROzac) 40 MG capsule; Take 1 capsule (40 mg total) by mouth daily    6  Leukocytosis, unspecified type  -     CBC and differential; Future; Expected date: 02/27/2020    7  Acute non-recurrent maxillary sinusitis  -     amoxicillin-clavulanate (AUGMENTIN) 400-57 mg/5 mL suspension; Take 10 mL (800 mg total) by mouth 2 (two) times a day for 10 days    8  Palpitations  -     POCT ECG      Pt may be having pain from sinus infection given the history but also could just be from having surgery and anesthesia    EKG was done for these palps and chest stightness,  EKG was NSR no signs of ischemia  PT advised she should go to the ed for chest pain and any worsening of symptoms  Patient Instructions     Obesity   AMBULATORY CARE:   Obesity  is when your body mass index (BMI) is greater than 30  Your healthcare provider will use your height and weight to measure your BMI  The risks of obesity include  many health problems, such as injuries or physical disability  You may need tests to check for the following:  · Diabetes     · High blood pressure or high cholesterol     · Heart disease     · Gallbladder or liver disease     · Cancer of the colon, breast, prostate, liver, or kidney     · Sleep apnea     · Arthritis or gout  Seek care immediately if:   · You have a severe headache, confusion, or difficulty speaking  · You have weakness on one side of your body  · You have chest pain, sweating, or shortness of breath  Contact your healthcare provider if:   · You have symptoms of gallbladder or liver disease, such as pain in your upper abdomen  · You have knee or hip pain and discomfort while walking       · You have symptoms of diabetes, such as intense hunger and thirst, and frequent urination  · You have symptoms of sleep apnea, such as snoring or daytime sleepiness  · You have questions or concerns about your condition or care  Treatment for obesity  focuses on helping you lose weight to improve your health  Even a small decrease in BMI can reduce the risk for many health problems  Your healthcare provider will help you set a weight-loss goal   · Lifestyle changes  are the first step in treating obesity  These include making healthy food choices and getting regular physical activity  Your healthcare provider may suggest a weight-loss program that involves coaching, education, and therapy  · Medicine  may help you lose weight when it is used with a healthy diet and physical activity  · Surgery  can help you lose weight if you are very obese and have other health problems  There are several types of weight-loss surgery  Ask your healthcare provider for more information  Be successful losing weight:   · Set small, realistic goals  An example of a small goal is to walk for 20 minutes 5 days a week  Anther goal is to lose 5% of your body weight  · Tell friends, family members, and coworkers about your goals  and ask for their support  Ask a friend to lose weight with you, or join a weight-loss support group  · Identify foods or triggers that may cause you to overeat , and find ways to avoid them  Remove tempting high-calorie foods from your home and workplace  Place a bowl of fresh fruit on your kitchen counter  If stress causes you to eat, then find other ways to cope with stress  · Keep a diary to track what you eat and drink  Also write down how many minutes of physical activity you do each day  Weigh yourself once a week and record it in your diary  Eating changes: You will need to eat 500 to 1,000 fewer calories each day than you currently eat to lose 1 to 2 pounds a week   The following changes will help you cut calories:  · Eat smaller portions  Use small plates, no larger than 9 inches in diameter  Fill your plate half full of fruits and vegetables  Measure your food using measuring cups until you know what a serving size looks like  · Eat 3 meals and 1 or 2 snacks each day  Plan your meals in advance  [facility] and eat at home most of the time  Eat slowly  · Eat fruits and vegetables at every meal   They are low in calories and high in fiber, which makes you feel full  Do not add butter, margarine, or cream sauce to vegetables  Use herbs to season steamed vegetables  · Eat less fat and fewer fried foods  Eat more baked or grilled chicken and fish  These protein sources are lower in calories and fat than red meat  Limit fast food  Dress your salads with olive oil and vinegar instead of bottled dressing  · Limit the amount of sugar you eat  Do not drink sugary beverages  Limit alcohol  Activity changes:  Physical activity is good for your body in many ways  It helps you burn calories and build strong muscles  It decreases stress and depression, and improves your mood  It can also help you sleep better  Talk to your healthcare provider before you begin an exercise program   · Exercise for at least 30 minutes 5 days a week  Start slowly  Set aside time each day for physical activity that you enjoy and that is convenient for you  It is best to do both weight training and an activity that increases your heart rate, such as walking, bicycling, or swimming  · Find ways to be more active  Do yard work and housecleaning  Walk up the stairs instead of using elevators  Spend your leisure time going to events that require walking, such as outdoor festivals or fairs  This extra physical activity can help you lose weight and keep it off  Follow up with your healthcare provider as directed: You may need to meet with a dietitian  Write down your questions so you remember to ask them during your visits     © 2017 [facility] Information is for End User's use only and may not be sold, redistributed or otherwise used for commercial purposes  All illustrations and images included in CareNotes® are the copyrighted property of A D A M , Inc  or Сергей [employer]wick  The above information is an  only  It is not intended as medical advice for individual conditions or treatments  Talk to your doctor, nurse or pharmacist before following any medical regimen to see if it is safe and effective for you  Return in about 5 weeks (around 3/26/2020) for Recheck  Subjective:      Patient ID: Elisha Langston is a 64 y o  female  Chief Complaint   Patient presents with    Transition of Care Management     [facility]  Bariatric Surgery   [id number]    Anxiety     since surgery      Headache    sinus pressure       Pt is here for a follow up for surgery Nurses TCM was apprciated  Was discharged 2 days ago    PT states she has a HA since she woke up from anesthesia - pt states she was told the HA was unrelated to the anesthesia and the surgery  PT states she was advised to ask me  Pt states her pre discharge labs shows an elevated WBC count, pt states she was told she should ask me about this    Pt is not in much postoperative pain  Pt has had BM's  No blood in stool or urine    Pt states she has been having yellow and green sputum from her nose    Pt states she feels her chest pounding and states she gets tightness in her chest from time to time  States it is unsettling to her      The following portions of the patient's history were reviewed and updated as appropriate: allergies, current medications, past family history, past medical history, past social history, past surgical history and problem list     Review of Systems   Constitutional: Negative  Negative for activity change, appetite change, chills, diaphoresis and fatigue  HENT: Negative    Negative for dental problem, ear pain, sinus pressure and sore throat  Eyes: Negative  Negative for photophobia, pain, discharge, redness, itching and visual disturbance  Respiratory: Negative for apnea and chest tightness  Cardiovascular: Positive for chest pain and palpitations  Negative for leg swelling  Gastrointestinal: Negative  Negative for abdominal distention, abdominal pain, constipation and diarrhea  Endocrine: Negative  Negative for cold intolerance and heat intolerance  Genitourinary: Negative  Negative for difficulty urinating and dyspareunia  Musculoskeletal: Negative  Negative for arthralgias and back pain  Skin: Negative  Allergic/Immunologic: Negative for environmental allergies  Neurological: Negative  Negative for dizziness  Psychiatric/Behavioral: Negative  Negative for agitation           Current Outpatient Medications   Medication Sig Dispense Refill    acetaminophen (TYLENOL) 325 mg tablet Take 3 tablets (975 mg total) by mouth every 8 (eight) hours for 3 days 27 tablet 0    amoxicillin-clavulanate (AUGMENTIN) 400-57 mg/5 mL suspension Take 10 mL (800 mg total) by mouth 2 (two) times a day for 10 days 100 mL 0    Calcium 500-100 MG-UNIT CHEW Chew 3  Tabs daily      FLUoxetine (PROzac) 40 MG capsule Take 1 capsule (40 mg total) by mouth daily      Multiple Vitamin (MULTIVITAMIN) tablet Take 1 tablet by mouth daily      Multiple Vitamins-Minerals (BARIATRIC FUSION) CHEW Chew daily      omeprazole (PriLOSEC) 20 mg delayed release capsule Take 1 capsule (20 mg total) by mouth daily 90 capsule 1    ondansetron (ZOFRAN-ODT) 4 mg disintegrating tablet Take 1 tablet (4 mg total) by mouth every 6 (six) hours as needed for nausea or vomiting 20 tablet 0    oxyCODONE (ROXICODONE) 5 mg immediate release tablet Take 1 tablet (5 mg total) by mouth every 4 (four) hours as needed for moderate painMax Daily Amount: 30 mg 10 tablet 0    rosuvastatin (CRESTOR) 5 mg tablet Take 1 tablet (5 mg total) by mouth daily 90 tablet 3     No current facility-administered medications for this visit  Objective:    /80   Pulse 84   Temp (!) 97 4 °F (36 3 °C)   Resp 18   Ht 5' 4" (1 626 m)   Wt 96 6 kg (213 lb)   SpO2 98%   BMI 36 56 kg/m²        Physical Exam   Constitutional: She appears well-developed and well-nourished  No distress  HENT:   Head: Normocephalic and atraumatic  Right Ear: External ear normal    Left Ear: External ear normal    Nose: Nose normal    Mouth/Throat: Oropharynx is clear and moist  No oropharyngeal exudate  Eyes: Pupils are equal, round, and reactive to light  EOM are normal  Right eye exhibits no discharge  Left eye exhibits no discharge  No scleral icterus  Neck: No thyromegaly present  Cardiovascular: Normal rate and normal heart sounds  No murmur heard  Pulmonary/Chest: Effort normal and breath sounds normal  No respiratory distress  She has no wheezes  Abdominal: Soft  Bowel sounds are normal  She exhibits no distension and no mass  There is no tenderness  There is no rebound and no guarding  Musculoskeletal: Normal range of motion  Neurological: She is alert  She displays normal reflexes  Coordination normal    Skin: Skin is warm and dry  No rash noted  She is not diaphoretic  No erythema  Psychiatric: She has a normal mood and affect  Her behavior is normal    Nursing note and vitals reviewed  TCM Call (since 1/20/2020)     Date and time call was made  2/18/2020  2:44 PM    Hospital care reviewed  Records reviewed    Patient was hospitialized at  [facility]    Date of Admission  02/17/20    Date of discharge  02/18/20    Diagnosis  Morbid obesity     Disposition  Home    Were the patients medications reviewed and updated  Yes    Current Symptoms  Headache      TCM Call (since 1/20/2020)     Post hospital issues  None    Should patient be enrolled in anticoag monitoring? No    Scheduled for follow up?   Yes    Patients specialists  Other (comment)    Other specialists names  Ewelina Fleming MD    Did you obtain your prescribed medications  Yes    Do you need help managing your prescriptions or medications  No    Is transportation to your appointment needed  No    I have advised the patient to call PCP with any new or worsening symptoms  [address] [place] [address] [facility]    Support System  Family    The type of support provided  Physical; Emotional    Do you have social support  Yes, as much as I need    Are you recieving any outpatient services  No    Are you recieving home care services  No    Interperter language line needed  No    Counseling  Patient    Counseling topics  Activities of daily living; Importance of RX compliance; patient and family education; instructions for management; Risk factor reduction    Comments  I spoke with jE Conde on 2/18  She is home and doing fine  she has a headache for which she took Tylenol  It did not really relieve her h/a yet so I encouraged her to call her surgeon if no improvement so she can inquire about another pain medication  She is tolerating her liquids   She knows to report any fevers or uncontrolled pain to her surgeon and knows to go the ER if any chest pain, dyspnea, etc Winda Downy        Recent Results (from the past 672 hour(s))   MRSA culture    Collection Time: 02/13/20  9:27 AM   Result Value Ref Range    MRSA Culture Only       No Methicillin Resistant Staphlyococcus aureus (MRSA) isolated   Basic metabolic panel    Collection Time: 02/18/20  6:13 AM   Result Value Ref Range    Sodium 131 (L) 136 - 145 mmol/L    Potassium 4 2 3 5 - 5 3 mmol/L    Chloride 98 (L) 100 - 108 mmol/L    CO2 26 21 - 32 mmol/L    ANION GAP 7 4 - 13 mmol/L    BUN 10 5 - 25 mg/dL    Creatinine 0 75 0 60 - 1 30 mg/dL    Glucose 127 65 - 140 mg/dL    Calcium 8 8 8 3 - 10 1 mg/dL    eGFR 89 ml/min/1 73sq m   CBC (With Platelets)    Collection Time: 02/18/20  6:13 AM   Result Value Ref Range    WBC 16 78 (H) 4 31 - 10 16 Thousand/uL    RBC 4 44 3 81 - 5 12 Million/uL    Hemoglobin 13 3 11 5 - 15 4 g/dL    Hematocrit 40 4 34 8 - 46 1 %    MCV 91 82 - 98 fL    MCH 30 0 26 8 - 34 3 pg    MCHC 32 9 31 4 - 37 4 g/dL    RDW 12 8 11 6 - 15 1 %    Platelets 591 014 - 947 Thousands/uL    MPV 13 0 (H) 8 9 - 12 7 fL         Lorena Wilkerson DO  BMI Counseling: Body mass index is 36 56 kg/m²  The BMI is above normal  Nutrition recommendations include reducing portion sizes

## 2020-02-27 ENCOUNTER — OFFICE VISIT (OUTPATIENT)
Dept: BARIATRICS | Facility: CLINIC | Age: 60
End: 2020-02-27

## 2020-02-27 VITALS
HEIGHT: 64 IN | WEIGHT: 210.8 LBS | RESPIRATION RATE: 18 BRPM | DIASTOLIC BLOOD PRESSURE: 64 MMHG | HEART RATE: 64 BPM | BODY MASS INDEX: 35.99 KG/M2 | SYSTOLIC BLOOD PRESSURE: 108 MMHG | TEMPERATURE: 98.9 F

## 2020-02-27 VITALS — HEIGHT: 64 IN | BODY MASS INDEX: 35.99 KG/M2 | WEIGHT: 210.8 LBS

## 2020-02-27 DIAGNOSIS — M17.12 PRIMARY OSTEOARTHRITIS OF LEFT KNEE: ICD-10-CM

## 2020-02-27 DIAGNOSIS — G47.33 OSA (OBSTRUCTIVE SLEEP APNEA): ICD-10-CM

## 2020-02-27 DIAGNOSIS — R73.01 IFG (IMPAIRED FASTING GLUCOSE): ICD-10-CM

## 2020-02-27 DIAGNOSIS — Z98.84 S/P BARIATRIC SURGERY: ICD-10-CM

## 2020-02-27 DIAGNOSIS — E66.01 SEVERE OBESITY (BMI 35.0-39.9) WITH COMORBIDITY (HCC): ICD-10-CM

## 2020-02-27 DIAGNOSIS — E66.9 OBESITY, CLASS II, BMI 35-39.9: ICD-10-CM

## 2020-02-27 DIAGNOSIS — E78.2 MIXED HYPERLIPIDEMIA: ICD-10-CM

## 2020-02-27 DIAGNOSIS — K91.2 POSTSURGICAL MALABSORPTION: Primary | ICD-10-CM

## 2020-02-27 DIAGNOSIS — Z48.815 ENCOUNTER FOR SURGICAL AFTERCARE FOLLOWING SURGERY ON THE DIGESTIVE SYSTEM: Primary | ICD-10-CM

## 2020-02-27 PROCEDURE — 3008F BODY MASS INDEX DOCD: CPT | Performed by: SURGERY

## 2020-02-27 PROCEDURE — RECHECK

## 2020-02-27 PROCEDURE — 99024 POSTOP FOLLOW-UP VISIT: CPT | Performed by: SURGERY

## 2020-02-27 PROCEDURE — 1111F DSCHRG MED/CURRENT MED MERGE: CPT | Performed by: SURGERY

## 2020-02-27 RX ORDER — FLUOXETINE 10 MG/1
40 TABLET, FILM COATED ORAL
COMMUNITY
Start: 2020-02-25 | End: 2020-07-10 | Stop reason: SDUPTHER

## 2020-02-27 NOTE — PROGRESS NOTES
FIRST POST-OPERATIVE VISIT - BARIATRIC SURGERY  Rand Michael 64 y o  female MRN: [id number]  Unit/Bed#:  Encounter: [id number]      HPI:  Rand Michael is a 64 y o  female who presents for the 1st postoperative visit following a laparoscopic Cornelio-en-Y gastric bypass  She is tolerating a diet and denies pain  Still complains of a minor headache and is currently on abx for a sinus infection  Historical Information   Past Medical History:   Diagnosis Date    Anxiety     Arthritis     DDD    [facility]     Colon polyp     Costochondritis     last assessed: 4/4/2016    CPAP (continuous positive airway pressure) dependence     Depression     HPV (human papilloma virus) infection 12/2017    found on colonoscopy    Kidney stone     ADRIANA (obstructive sleep apnea)     cpap level 10-15    Stress     Stress incontinence     Wears glasses      Past Surgical History:   Procedure Laterality Date    BILATERAL OOPHORECTOMY      COLONOSCOPY      COLONOSCOPY N/A 12/27/2017    Procedure: COLONOSCOPY;  Surgeon: Pedro Glass MD;  Location: [place] [address] GI LAB;   Service: Gastroenterology    HYSTERECTOMY      partial    LIPOMA RESECTION      back    LIPOSUCTION      lipoma     DIAGNOSTIC ANOSCOPY & BIOPSY N/A 8/30/2018    Procedure: ANOSCOPY HIGH RESOLUTION;  Surgeon: Esthela Tavera MD;  [facility];  Service: Colorectal     DIAGNOSTIC ANOSCOPY & BIOPSY N/A 4/4/2019    Procedure: ANOSCOPY HIGH RESOLUTION;  Surgeon: Esthela Tavera MD;  [facility];  Service: Colorectal     DIAGNOSTIC ANOSCOPY & BIOPSY N/A 10/4/2019    Procedure: ANOSCOPY HIGH RESOLUTION;  Surgeon: Esthela Tavera MD;  [facility];  Service: Colorectal    CA DESTRUCTION,ANAL LESION(S),EXTENSIVE N/A 4/12/2018    Procedure: EXCISION CONDYLOMA ANAL/RECTAL;  Surgeon: Esthela Tavera MD;  [facility];  Service: Colorectal    CA DESTRUCTION,ANAL LESION(S),EXTENSIVE N/A 2018    Procedure: EXCISION / FULGURATION OF LESIONS;  Surgeon: Kahlil Najera MD;  [facility];  Service: Colorectal    MS LAP GASTRIC BYPASS/HEIDI-EN-Y N/A 2020    Procedure: LAPAROSCOPIC HEIDI-EN-Y GASTRIC BYPASS;  Surgeon: Kellie Hernandez MD;  Location: [address] [place] [address];  Service: Bariatrics    MS SURG DIAGNOSTIC EXAM, ANORECTAL N/A 2018    Procedure: EXAM UNDER ANESTHESIA (EUA); Surgeon: Kahlil Najera MD;  [facility];  Service: Colorectal    MS SURG DIAGNOSTIC EXAM, ANORECTAL N/A 2018    Procedure: EXAM UNDER ANESTHESIA (EUA); Surgeon: Kahlil Najera MD;  [facility];  Service: Colorectal    MS SURG DIAGNOSTIC EXAM, ANORECTAL N/A 2019    Procedure: EXAM UNDER ANESTHESIA (EUA); Surgeon: Kahlil Najera MD;  [facility];  Service: Colorectal    MS SURG DIAGNOSTIC EXAM, ANORECTAL N/A 10/4/2019    Procedure: EXAM UNDER ANESTHESIA (EUA);   Surgeon: Kahlil Najera MD;  [facility];  Service: Colorectal    TONSILLECTOMY      WISDOM TOOTH EXTRACTION      x4     Social History   Social History     Substance and Sexual Activity   Alcohol Use Not Currently    Alcohol/week: 2 0 standard drinks    Types: 1 Glasses of wine, 1 Cans of beer per week    Frequency: 2-4 times a month    Drinks per session: 1 or 2    Binge frequency: Never    Comment: socially     Social History     Substance and Sexual Activity   Drug Use No     Social History     Tobacco Use   Smoking Status Former Smoker    Packs/day: 0 25    Years: 10 00    Pack years: 2 50    Types: Cigarettes, Cigars    Start date: 2008    Last attempt to quit: 2018    Years since quittin 6   Smokeless Tobacco Never Used   Tobacco Comment    quit 2018     Family History: non-contributory    Meds/Allergies   all medications and allergies reviewed  Allergies   Allergen Reactions    Sulfa Antibiotics Rash       Objective     Current Vitals:   Blood Pressure: 108/64 (02/27/20 0856)  Pulse: 64 (02/27/20 0856)  Temperature: 98 9 °F (37 2 °C) (02/27/20 0856)  Temp Source: Tympanic (02/27/20 0856)  Respirations: 18 (02/27/20 0856)  Height: 5' 4" (162 6 cm) (02/27/20 0856)  Weight - Scale: 95 6 kg (210 lb 12 8 oz) (02/27/20 0856)     Invasive Devices     None                 Physical Exam   Constitutional: She appears well-developed and well-nourished  Pulmonary/Chest: Effort normal    Abdominal: Soft  She exhibits no distension  There is no tenderness  Wounds clean/dry/intact without surrounding erythema     Skin: Skin is warm and dry  Psychiatric: She has a normal mood and affect  Her behavior is normal    Vitals reviewed  Assessment/Plan :    Patient is presenting for the first postoperative visit, patient hospital stay was uneventful without any complications, patient is doing well, has no complaints, is taking vitamins as instructed, currently tolerating the blenderized diet, will advance to soft diet  Patient will also be meeting with our dietician today to review vitamin and mineral supplements and also go over diet and emphasize postoperative commitment and compliance  The patient was also instructed to start exercising on a regular basis  However, I recommended no heavy lifting, or weight exercises for another 2 weeks  F/U in 4 weeks  Patient was instructed to call if develops nausea, vomiting, fever or chills

## 2020-02-27 NOTE — LETTER
February 27, 2020     Forrest Morrow DO  [address] [facility] [address]    Patient: Rosanna Padilla   YOB: 1960   Date of Visit: 2/27/2020       Dear Dr Mary Calderon:    Thank you for referring Rosanna Padilla to me for metabolic and bariatric surgery  Below are my notes for her first post-operative visit  If you have questions, please do not hesitate to call me  I look forward to following your patient along with you  Sincerely,        Mamta Hills MD        CC: No Recipients  Mamta Hills MD  2/27/2020  9:46 AM  Sign at close encounter  [address] 64 y o  female MRN: [id number]  Unit/Bed#:  Encounter: [id number]      HPI:  Rosanna Padilla is a 64 y o  female who presents for the 1st postoperative visit following a laparoscopic Cornelio-en-Y gastric bypass  She is tolerating a diet and denies pain  Still complains of a minor headache and is currently on abx for a sinus infection  Historical Information   Past Medical History:   Diagnosis Date    Anxiety     Arthritis     DDD    Bipolar 1 disorder ([facility] [address] )     Colon polyp     Costochondritis     last assessed: 4/4/2016    CPAP (continuous positive airway pressure) dependence     Depression     HPV (human papilloma virus) infection 12/2017    found on colonoscopy    Kidney stone     ADRIANA (obstructive sleep apnea)     cpap level 10-15    Stress     Stress incontinence     Wears glasses      Past Surgical History:   Procedure Laterality Date    BILATERAL OOPHORECTOMY      COLONOSCOPY      COLONOSCOPY N/A 12/27/2017    Procedure: COLONOSCOPY;  Surgeon: Ahsan Devries MD;  Location: [facility];   Service: Gastroenterology    HYSTERECTOMY      partial    LIPOMA RESECTION      back    LIPOSUCTION      lipoma     DIAGNOSTIC ANOSCOPY & BIOPSY N/A 8/30/2018    Procedure: ANOSCOPY HIGH RESOLUTION;  Surgeon: Penny Cool MD;  Location:  MAIN OR;  Service: Colorectal    [id number] DIAGNOSTIC ANOSCOPY & BIOPSY N/A 4/4/2019    Procedure: ANOSCOPY HIGH RESOLUTION;  Surgeon: Raheem Mckee MD;  [facility];  Service: Colorectal     DIAGNOSTIC ANOSCOPY & BIOPSY N/A 10/4/2019    Procedure: ANOSCOPY HIGH RESOLUTION;  Surgeon: Raheem Mckee MD;  [facility];  Service: Colorectal    MN DESTRUCTION,ANAL LESION(S),EXTENSIVE N/A 4/12/2018    Procedure: EXCISION CONDYLOMA ANAL/RECTAL;  Surgeon: Raheem Mckee MD;  [facility];  Service: Colorectal    MN DESTRUCTION,ANAL LESION(S),EXTENSIVE N/A 8/30/2018    Procedure: EXCISION / FULGURATION OF LESIONS;  Surgeon: Raheem Mckee MD;  [facility];  Service: Colorectal    MN LAP GASTRIC BYPASS/HEIDI-EN-Y N/A 2/17/2020    Procedure: LAPAROSCOPIC HEIDI-EN-Y GASTRIC BYPASS;  Surgeon: Bernardino Akhtar MD;  Location: [address] [place] [address];  Service: [address] [place] [address], ANORECTAL N/A 4/12/2018    Procedure: EXAM UNDER ANESTHESIA (EUA); Surgeon: Raheem Mckee MD;  [facility];  Service: Colorectal    MN SURG DIAGNOSTIC EXAM, ANORECTAL N/A 8/30/2018    Procedure: EXAM UNDER ANESTHESIA (EUA); Surgeon: Raheem Mckee MD;  [facility];  Service: Colorectal    MN SURG DIAGNOSTIC EXAM, ANORECTAL N/A 4/4/2019    Procedure: EXAM UNDER ANESTHESIA (EUA); Surgeon: Raheem Mckee MD;  [facility];  Service: Colorectal    MN SURG DIAGNOSTIC EXAM, ANORECTAL N/A 10/4/2019    Procedure: EXAM UNDER ANESTHESIA (EUA);   Surgeon: Raheem Mckee MD;  [facility];  Service: Colorectal    TONSILLECTOMY      WISDOM TOOTH EXTRACTION      x4     Social History   Social History     Substance and Sexual Activity   Alcohol Use Not Currently    Alcohol/week: 2 0 standard drinks    Types: 1 Glasses of wine, 1 Cans of beer per week    Frequency: 2-4 times a month    Drinks per session: 1 or 2    Binge frequency: Never    Comment: socially Social History     Substance and Sexual Activity   Drug Use No     Social History     Tobacco Use   Smoking Status Former Smoker    Packs/day: 0 25    Years: 10 00    Pack years: 2 50    Types: Cigarettes, Cigars    Start date: 2008    Last attempt to quit: 2018    Years since quittin 6   Smokeless Tobacco Never Used   Tobacco Comment    quit 2018     Family History: non-contributory    Meds/Allergies   all medications and allergies reviewed  Allergies   Allergen Reactions    Sulfa Antibiotics Rash       Objective     Current Vitals:   Blood Pressure: 108/64 (20)  Pulse: 64 (20)  Temperature: 98 9 °F (37 2 °C) (20)  Temp Source: Tympanic (20)  Respirations: 18 (20)  Height: 5' 4" (162 6 cm) (20)  Weight - Scale: 95 6 kg (210 lb 12 8 oz) (20)     Invasive Devices     None                 Physical Exam   Constitutional: She appears well-developed and well-nourished  Pulmonary/Chest: Effort normal    Abdominal: Soft  She exhibits no distension  There is no tenderness  Wounds clean/dry/intact without surrounding erythema     Skin: Skin is warm and dry  Psychiatric: She has a normal mood and affect  Her behavior is normal    Vitals reviewed  Assessment/Plan :    Patient is presenting for the first postoperative visit, patient hospital stay was uneventful without any complications, patient is doing well, has no complaints, is taking vitamins as instructed, currently tolerating the blenderized diet, will advance to soft diet  Patient will also be meeting with our dietician today to review vitamin and mineral supplements and also go over diet and emphasize postoperative commitment and compliance  The patient was also instructed to start exercising on a regular basis  However, I recommended no heavy lifting, or weight exercises for another 2 weeks  F/U in 4 weeks   Patient was instructed to call if develops nausea, vomiting, fever or chills

## 2020-02-27 NOTE — PROGRESS NOTES
Weight Management Nutrition Class     Diagnosis: Obesity    Bariatric Surgeon: Dr Luis Marte    Surgery: Gastric Bypass Laparoscopic    Class: first post op note    Topics discussed today include:     fluid goals post op, protein goals post op, constipation, chew food well, exercise, avoidance of alcohol, PPI use, diet progression, hypoglycemia, dumping syndrome, protein supplems, vitamin/mineral supplements and calcium supplements    Patient was able to verbalize basic diet (protein, fluid, vitamin and mineral) recommendations and possible nutrition-related complications  Yes     Tolerating pureed foods, no issues  Tried: cream of wheat with milk, LF cottage cheese with cinnamon, ricotta bake, SF jell-o (4 some days) and SF iced pops, protein chicken broth    Protein shake:  Premier 2 per day or 1 premier and 1 protein chicken broth  Fluids: At least 48oz  Having a BM q 3 days  Has used Miralax a couple times    Vitamins:  ProCare 1 a day chewable  Bariatric advantage chewy calcium

## 2020-03-16 ENCOUNTER — OFFICE VISIT (OUTPATIENT)
Dept: BARIATRICS | Facility: CLINIC | Age: 60
End: 2020-03-16

## 2020-03-16 ENCOUNTER — TELEPHONE (OUTPATIENT)
Dept: BARIATRICS | Facility: CLINIC | Age: 60
End: 2020-03-16

## 2020-03-16 VITALS
RESPIRATION RATE: 17 BRPM | SYSTOLIC BLOOD PRESSURE: 116 MMHG | DIASTOLIC BLOOD PRESSURE: 72 MMHG | WEIGHT: 202 LBS | BODY MASS INDEX: 34.49 KG/M2 | TEMPERATURE: 98.2 F | HEART RATE: 74 BPM | HEIGHT: 64 IN

## 2020-03-16 DIAGNOSIS — F32.A ANXIETY AND DEPRESSION: ICD-10-CM

## 2020-03-16 DIAGNOSIS — R73.01 IFG (IMPAIRED FASTING GLUCOSE): ICD-10-CM

## 2020-03-16 DIAGNOSIS — M54.50 LOW BACK PAIN: ICD-10-CM

## 2020-03-16 DIAGNOSIS — F41.9 ANXIETY AND DEPRESSION: ICD-10-CM

## 2020-03-16 DIAGNOSIS — Z98.84 S/P BARIATRIC SURGERY: ICD-10-CM

## 2020-03-16 DIAGNOSIS — Z48.815 ENCOUNTER FOR SURGICAL AFTERCARE FOLLOWING SURGERY ON THE DIGESTIVE SYSTEM: ICD-10-CM

## 2020-03-16 DIAGNOSIS — E78.2 MIXED HYPERLIPIDEMIA: ICD-10-CM

## 2020-03-16 DIAGNOSIS — E66.9 OBESITY, CLASS I, BMI 30-34.9: ICD-10-CM

## 2020-03-16 DIAGNOSIS — G47.33 OSA (OBSTRUCTIVE SLEEP APNEA): ICD-10-CM

## 2020-03-16 DIAGNOSIS — T81.31XA SURGICAL WOUND DEHISCENCE: Primary | ICD-10-CM

## 2020-03-16 PROCEDURE — 1111F DSCHRG MED/CURRENT MED MERGE: CPT | Performed by: SURGERY

## 2020-03-16 PROCEDURE — 99024 POSTOP FOLLOW-UP VISIT: CPT | Performed by: SURGERY

## 2020-03-16 PROCEDURE — 3008F BODY MASS INDEX DOCD: CPT | Performed by: SURGERY

## 2020-03-16 NOTE — TELEPHONE ENCOUNTER
Patient called the office stating the incision at her navel is infected, oozing and has a bad odor since yesterday  She notices white yellow clear pus from the incision  Patient stated no pain or fever  Patient advised to take a picture of the incision and forward it to provider  From: Augie Araujo   Sent: 3/15/2020   9:09 PM EDT   To: , *   Subject: Pre-Op/Post-Op Question                           I'll be calling the office in the morning but wanted to reach out  Tomorrow is 1 momth since my RNY  Suddenly, the incision at my navel is infected and oozing  Hoping I can be seen ASAP     Thanks   Augie Araujo   [phone]

## 2020-03-16 NOTE — PROGRESS NOTES
Progress Note - Bariatric Surgery   Rand Michael 64 y o  female MRN: [id number]  Unit/Bed#:  Encounter: [id number]    Assessment:  59/F s/p LRYGB 6/44/6708 with umbilical wound dehiscence; no evidence of infection present     Plan:  Remove packing in 24 hours then shower with soap and water and redress with 4x4 gauze (no further packing after 24 hours)  Continue routine outpatient follow up     Subjective/Objective     Subjective: Patient states umbilical wound opened yesterday and was draining a malodorous yellow/clear transudate with black specs  Denies fevers, chills or sweats  States she was increasing her activity and stretching a lot  Review of systems: Negative except as noted in HPI     Objective: Looks well     Blood pressure 116/72, pulse 74, temperature 98 2 °F (36 8 °C), temperature source Tympanic, resp  rate 17, height 5' 4" (1 626 m), weight 91 6 kg (202 lb)  ,Body mass index is 34 67 kg/m²  Invasive Devices     None                 Physical Exam:     No distress   RRR  Breathing non labored  Abd soft; NT/ND;  All wounds (excluding umbilicus C/D/I); umbilical wound with very mild dehiscence; this was opened; scant clear/yellow tinged transudate was evacuated; no odor; no blanching erythema; two applications of silver nitrate were applied and wound was packed with 1/4 inch iodoform packing and dressed with 4X4 gauze

## 2020-03-25 ENCOUNTER — OFFICE VISIT (OUTPATIENT)
Dept: BARIATRICS | Facility: CLINIC | Age: 60
End: 2020-03-25

## 2020-03-25 VITALS
WEIGHT: 197.8 LBS | TEMPERATURE: 98.5 F | HEART RATE: 54 BPM | HEIGHT: 64 IN | DIASTOLIC BLOOD PRESSURE: 70 MMHG | BODY MASS INDEX: 33.77 KG/M2 | SYSTOLIC BLOOD PRESSURE: 118 MMHG

## 2020-03-25 DIAGNOSIS — F41.9 ANXIETY AND DEPRESSION: ICD-10-CM

## 2020-03-25 DIAGNOSIS — M54.50 LOW BACK PAIN: ICD-10-CM

## 2020-03-25 DIAGNOSIS — F32.A ANXIETY AND DEPRESSION: ICD-10-CM

## 2020-03-25 DIAGNOSIS — T81.31XA SURGICAL WOUND DEHISCENCE: ICD-10-CM

## 2020-03-25 DIAGNOSIS — G47.33 OBSTRUCTIVE SLEEP APNEA: ICD-10-CM

## 2020-03-25 DIAGNOSIS — R73.01 IFG (IMPAIRED FASTING GLUCOSE): ICD-10-CM

## 2020-03-25 DIAGNOSIS — Z98.84 S/P BARIATRIC SURGERY: ICD-10-CM

## 2020-03-25 DIAGNOSIS — Z48.815 ENCOUNTER FOR SURGICAL AFTERCARE FOLLOWING SURGERY ON THE DIGESTIVE SYSTEM: Primary | ICD-10-CM

## 2020-03-25 DIAGNOSIS — E78.2 MIXED HYPERLIPIDEMIA: ICD-10-CM

## 2020-03-25 PROCEDURE — 1111F DSCHRG MED/CURRENT MED MERGE: CPT | Performed by: SURGERY

## 2020-03-25 PROCEDURE — 99024 POSTOP FOLLOW-UP VISIT: CPT | Performed by: SURGERY

## 2020-03-25 PROCEDURE — 3008F BODY MASS INDEX DOCD: CPT | Performed by: SURGERY

## 2020-03-25 NOTE — PROGRESS NOTES
Progress Note - Bariatric Surgery   Emanuel López 64 y o  female MRN: [id number]  Unit/Bed#:  Encounter: [id number]    Assessment:  59/F s/p LRYGB 1/99/4622 with umbilical wound dehiscence; improved; 50% closed with 100% beefy red granulation tissue     Plan:  Remove packing in 24 hours and shower with soap/water  Reapply zinc/bacitracin ointment on wound and dress with gauze  Continue routine outpatient follow-up    Subjective/Objective     Subjective: She states her wound is decreasing in size and drainage is decreasing  Denies fever/chills/sweats  Objective: Looks well     Blood pressure 118/70, pulse (!) 54, temperature 98 5 °F (36 9 °C), temperature source Tympanic, height 5' 4" (1 626 m), weight 89 7 kg (197 lb 12 8 oz)  ,Body mass index is 33 95 kg/m²        Physical Exam:     No distress   RRR  Breathing non labored   Abd soft; NT/ND;  Umbilical wound size decreased; 100% beefy red granulation tissue; no surrounding kal-wound erythema

## 2020-03-27 ENCOUNTER — OFFICE VISIT (OUTPATIENT)
Dept: BARIATRICS | Facility: CLINIC | Age: 60
End: 2020-03-27

## 2020-03-27 DIAGNOSIS — K91.2 POSTSURGICAL MALABSORPTION: Primary | ICD-10-CM

## 2020-03-27 PROCEDURE — RECHECK

## 2020-03-27 NOTE — PROGRESS NOTES
Phone Consult:  i  Name was verified by patient stating name? yes  ii   verified by patient stating? yes  iii  You verified the patient is alone? yes  iv  I would like to verify that you were offered a live visit but are now consenting to this telephone visit? yes  v  This visit is free yes      Weight Management Nutrition Class     Diagnosis: Obesity    Bariatric Surgeon: Dr Kat Otto    Surgery: Gastric Bypass Laparoscopic    Class: 5 week post op     Topics discussed today include:     fluid goals post op, protein goals post op, constipation, chew food well, exercise, avoidance of alcohol, PPI use, diet progression, hypoglycemia, dumping syndrome, protein supplems, vitamin/mineral supplements and calcium supplements    Patient was able to verbalize basic diet (protein, fluid, vitamin and mineral) recommendations and possible nutrition-related complications  Yes     Tolerating softs:  [address] with canned peaches  Corn beef hash  Meatball  Ricotta bake  [place] ([facility]) chili with bean  Tuna fish    Premier shake:  1 per day  Getting about 50gm protein-advised to add a protein water for added protein and fluids  Some days as low as 20oz fluids, 32oz a good day    Taking her procare and BA chewy calcium  Was taking the calcium all at once  Advised to space by 2hrs  Taking miralax twice a week and not enough fluids causing some hard stool    Advised to increase both    Doing some yoga and walking (6675-4245 steps)

## 2020-05-19 PROBLEM — Z48.815 ENCOUNTER FOR SURGICAL AFTERCARE FOLLOWING SURGERY OF DIGESTIVE SYSTEM: Status: ACTIVE | Noted: 2020-02-20

## 2020-05-19 PROBLEM — K91.2 POSTSURGICAL MALABSORPTION: Status: ACTIVE | Noted: 2020-05-19

## 2020-05-20 ENCOUNTER — TELEMEDICINE (OUTPATIENT)
Dept: BARIATRICS | Facility: CLINIC | Age: 60
End: 2020-05-20
Payer: COMMERCIAL

## 2020-05-20 VITALS — BODY MASS INDEX: 29.12 KG/M2 | HEIGHT: 64 IN | WEIGHT: 170.6 LBS

## 2020-05-20 DIAGNOSIS — E78.2 MIXED HYPERLIPIDEMIA: ICD-10-CM

## 2020-05-20 DIAGNOSIS — E66.01 MORBID (SEVERE) OBESITY DUE TO EXCESS CALORIES (HCC): ICD-10-CM

## 2020-05-20 DIAGNOSIS — G47.33 OSA (OBSTRUCTIVE SLEEP APNEA): ICD-10-CM

## 2020-05-20 DIAGNOSIS — Z48.815 ENCOUNTER FOR SURGICAL AFTERCARE FOLLOWING SURGERY OF DIGESTIVE SYSTEM: Primary | ICD-10-CM

## 2020-05-20 DIAGNOSIS — K91.2 POSTSURGICAL MALABSORPTION: ICD-10-CM

## 2020-05-20 DIAGNOSIS — R73.01 IFG (IMPAIRED FASTING GLUCOSE): ICD-10-CM

## 2020-05-20 PROCEDURE — 99214 OFFICE O/P EST MOD 30 MIN: CPT | Performed by: PHYSICIAN ASSISTANT

## 2020-05-20 RX ORDER — OMEPRAZOLE 20 MG/1
20 CAPSULE, DELAYED RELEASE ORAL DAILY
Qty: 90 CAPSULE | Refills: 0 | Status: SHIPPED | OUTPATIENT
Start: 2020-05-20 | End: 2020-08-21 | Stop reason: ALTCHOICE

## 2020-05-21 ENCOUNTER — TELEMEDICINE (OUTPATIENT)
Dept: BARIATRICS | Facility: CLINIC | Age: 60
End: 2020-05-21

## 2020-05-21 VITALS — BODY MASS INDEX: 29.12 KG/M2 | HEIGHT: 64 IN | WEIGHT: 170.6 LBS

## 2020-05-21 DIAGNOSIS — K91.2 POSTSURGICAL MALABSORPTION: Primary | ICD-10-CM

## 2020-05-21 PROCEDURE — RECHECK

## 2020-05-29 ENCOUNTER — TELEMEDICINE (OUTPATIENT)
Dept: SLEEP CENTER | Facility: CLINIC | Age: 60
End: 2020-05-29
Payer: COMMERCIAL

## 2020-05-29 DIAGNOSIS — F41.9 ANXIETY AND DEPRESSION: ICD-10-CM

## 2020-05-29 DIAGNOSIS — R68.2 DRY MOUTH: ICD-10-CM

## 2020-05-29 DIAGNOSIS — E66.3 OVERWEIGHT WITH BODY MASS INDEX (BMI) OF 29 TO 29.9 IN ADULT: ICD-10-CM

## 2020-05-29 DIAGNOSIS — J30.1 SEASONAL ALLERGIC RHINITIS DUE TO POLLEN: ICD-10-CM

## 2020-05-29 DIAGNOSIS — F32.A ANXIETY AND DEPRESSION: ICD-10-CM

## 2020-05-29 DIAGNOSIS — F45.8 BRUXISM: ICD-10-CM

## 2020-05-29 DIAGNOSIS — G47.33 OSA (OBSTRUCTIVE SLEEP APNEA): Primary | ICD-10-CM

## 2020-05-29 PROCEDURE — 99214 OFFICE O/P EST MOD 30 MIN: CPT | Performed by: INTERNAL MEDICINE

## 2020-06-01 ENCOUNTER — TELEPHONE (OUTPATIENT)
Dept: SLEEP CENTER | Facility: CLINIC | Age: 60
End: 2020-06-01

## 2020-06-29 DIAGNOSIS — E78.2 MIXED HYPERLIPIDEMIA: ICD-10-CM

## 2020-06-29 RX ORDER — ROSUVASTATIN CALCIUM 5 MG/1
5 TABLET, COATED ORAL DAILY
Qty: 90 TABLET | Refills: 1 | Status: SHIPPED | OUTPATIENT
Start: 2020-06-29 | End: 2020-08-21

## 2020-07-09 ENCOUNTER — TELEPHONE (OUTPATIENT)
Dept: BEHAVIORAL/MENTAL HEALTH CLINIC | Facility: CLINIC | Age: 60
End: 2020-07-09

## 2020-07-10 DIAGNOSIS — F32.A ANXIETY AND DEPRESSION: ICD-10-CM

## 2020-07-10 DIAGNOSIS — F41.9 ANXIETY AND DEPRESSION: ICD-10-CM

## 2020-07-10 RX ORDER — FLUOXETINE HYDROCHLORIDE 40 MG/1
40 CAPSULE ORAL DAILY
Status: CANCELLED
Start: 2020-07-10

## 2020-07-10 RX ORDER — FLUOXETINE HYDROCHLORIDE 40 MG/1
40 CAPSULE ORAL DAILY
Start: 2020-07-10 | End: 2020-10-13 | Stop reason: SDUPTHER

## 2020-07-13 ENCOUNTER — TELEMEDICINE (OUTPATIENT)
Dept: BEHAVIORAL/MENTAL HEALTH CLINIC | Facility: CLINIC | Age: 60
End: 2020-07-13
Payer: COMMERCIAL

## 2020-07-13 DIAGNOSIS — F43.10 POST TRAUMATIC STRESS DISORDER (PTSD): ICD-10-CM

## 2020-07-13 DIAGNOSIS — F31.75 BIPOLAR 1 DISORDER, DEPRESSED, PARTIAL REMISSION (HCC): Primary | ICD-10-CM

## 2020-07-13 PROCEDURE — 90847 FAMILY PSYTX W/PT 50 MIN: CPT | Performed by: COUNSELOR

## 2020-07-13 NOTE — PSYCH
Virtual Regular Visit      Assessment/Plan:    Problem List Items Addressed This Visit     None      Visit Diagnoses     Bipolar 1 disorder, depressed, partial remission ([facility] [address] )    -  Primary    Post traumatic stress disorder (PTSD)                   Reason for visit is No chief complaint on file  Encounter provider Yuly Chapman [facility]    Provider located at [address] [place] [address]  [address]  [place] [address]  [phone]      Recent Visits  Date Type Provider Dept   07/09/20 Telephone KATHERINE [facility] Therapist [place]   Showing recent visits within past 7 days and meeting all other requirements     Today's Visits  Date Type Provider Dept   07/13/20 Usman Garcia [facility] Therapist [place]   Showing today's visits and meeting all other requirements     Future Appointments  No visits were found meeting these conditions  Showing future appointments within next 150 days and meeting all other requirements        The patient was identified by name and date of birth  Melanie Anglin was informMy office door was closed  Other methods to assure confidentiality were taken  She was informed that this is a telemedicine visit and that the visit is being conducted through virtual means and patient was informed that this is not a secure, HIPAA-complaint platform  She agrees to proceed      No one else was in the room  a She acknowledged consent and understanding of privacy and security of the video platform  The patient has agreed to participate and understands they can discontinue the visit at any time  Patient is aware this is a billable service  Subjective  Melanie Anglin is a 64 y o  female    She worked on developing her treatment goal  Agreed with client to reduce frequency to every other week, so she can " take the training wheels off " Discussed her need to "fix" others and how she is actively trying to focus on herself and her  and less on extended family members when they "pull her chains " and are needy  Discussed setting boundaries and limits as well as identifying her true feelings when she tries to enforce those limits  HPI     Past Medical History:   Diagnosis Date    Anxiety     Arthritis     DDD    Bipolar 1 disorder ([address] )     Colon polyp     Costochondritis     last assessed: 4/4/2016    CPAP (continuous positive airway pressure) dependence     Depression     HPV (human papilloma virus) infection 12/2017    found on colonoscopy    Kidney stone     ADRIANA (obstructive sleep apnea)     cpap level 10-15    Postgastrectomy malabsorption     Stress     Stress incontinence     Wears glasses        Past Surgical History:   Procedure Laterality Date    BILATERAL OOPHORECTOMY      COLONOSCOPY      COLONOSCOPY N/A 12/27/2017    Procedure: COLONOSCOPY;  Surgeon: Lindsay Addison MD;  [facility];   Service: Gastroenterology    HYSTERECTOMY      partial    LIPOMA RESECTION      back    LIPOSUCTION      lipoma     DIAGNOSTIC ANOSCOPY & BIOPSY N/A 8/30/2018    Procedure: ANOSCOPY HIGH RESOLUTION;  Surgeon: Simran Fields MD;  [facility];  Service: Colorectal     DIAGNOSTIC ANOSCOPY & BIOPSY N/A 4/4/2019    Procedure: ANOSCOPY HIGH RESOLUTION;  Surgeon: Simran Fields MD;  [facility];  Service: Colorectal     DIAGNOSTIC ANOSCOPY & BIOPSY N/A 10/4/2019    Procedure: ANOSCOPY HIGH RESOLUTION;  Surgeon: Simran Fields MD;  [facility];  Service: Colorectal    TX DESTRUCTION,ANAL LESION(S),EXTENSIVE N/A 4/12/2018    Procedure: EXCISION CONDYLOMA ANAL/RECTAL;  Surgeon: Simran Fields MD;  [facility];  Service: Colorectal    TX DESTRUCTION,ANAL LESION(S),EXTENSIVE N/A 8/30/2018    Procedure: EXCISION / Cyntha Meth OF LESIONS;  Surgeon: Franki Aguila Misti Lindo MD;  [facility];  Service: Colorectal    MN LAP GASTRIC BYPASS/HEIDI-EN-Y N/A 2/17/2020    Procedure: LAPAROSCOPIC HEIDI-EN-Y GASTRIC BYPASS;  Surgeon: Krzysztof Gardiner MD;  Location: [address] [place] [address];  Service: Bariatrics    MN SURG DIAGNOSTIC EXAM, ANORECTAL N/A 4/12/2018    Procedure: EXAM UNDER ANESTHESIA (EUA); Surgeon: Luz Thompson MD;  [facility];  Service: Colorectal    MN SURG DIAGNOSTIC EXAM, ANORECTAL N/A 8/30/2018    Procedure: EXAM UNDER ANESTHESIA (EUA); Surgeon: Luz Thompson MD;  [facility];  Service: Colorectal    MN SURG DIAGNOSTIC EXAM, ANORECTAL N/A 4/4/2019    Procedure: EXAM UNDER ANESTHESIA (EUA); Surgeon: Luz Thompson MD;  [facility];  Service: Colorectal    MN SURG DIAGNOSTIC EXAM, ANORECTAL N/A 10/4/2019    Procedure: EXAM UNDER ANESTHESIA (EUA); Surgeon: Luz Thompson MD;  [facility]: [facility];  Service: Colorectal    TONSILLECTOMY      WISDOM TOOTH EXTRACTION      x4       Current Outpatient Medications   Medication Sig Dispense Refill    Calcium 500-100 MG-UNIT CHEW Chew 3  Tabs daily      FLUoxetine (PROzac) 40 MG capsule Take 1 capsule (40 mg total) by mouth daily      Multiple Vitamin (MULTIVITAMIN) tablet Take 1 tablet by mouth daily      Multiple Vitamins-Minerals (BARIATRIC FUSION) CHEW Chew daily      omeprazole (PriLOSEC) 20 mg delayed release capsule Take 1 capsule (20 mg total) by mouth daily 90 capsule 0    rosuvastatin (CRESTOR) 5 mg tablet Take 1 tablet (5 mg total) by mouth daily 90 tablet 1     No current facility-administered medications for this visit  Allergies   Allergen Reactions    Sulfa Antibiotics Rash       Review of Systems    Video Exam    There were no vitals filed for this visit      Physical Exam     I spent 45 minutes directly with the patient during this visit      VIRTUAL VISIT DISCLAIMER    Enoc Alarcon acknowledges that she has consented to an online visit or consultation  She understands that the online visit is based solely on information provided by her, and that, in the absence of a face-to-face physical evaluation by the physician, the diagnosis she receives is both limited and provisional in terms of accuracy and completeness  This is not intended to replace a full medical face-to-face evaluation by the physician  Liyah Khan understands and accepts these terms

## 2020-07-13 NOTE — BH TREATMENT PLAN
Serena Cason  1960       Date of Initial Treatment Plan: 07/13/20   Date of Current Treatment Plan: 07/13/20    Treatment Plan Number 1     Strengths/Personal Resources for Self Care: Verbal skills, supportive , resilient       Diagnosis:   1  Bipolar 1 disorder, depressed, partial remission ([facility] [address] )     2  Post traumatic stress disorder (PTSD)         Area of Needs: Mood regulation and behavioral support strategies      Long Term Goal 1: A "I want to be able to live my life without needing to fix others - there's more to me than that "    Target Date: 01/13/21  Completion Date: To be determined  Short Term Objectives for Goal 1: Adia Harris will identify the people and situations that she feels drawn to "fix"  and Alexi will practice limit setting with those identified people/situation  Long Term Goal 2: " I have to work on my empathy and stop being a rescuer "    Target Date: 01/13/21  Completion Date: to be determined    Short Term Objectives for Goal 2: Adia Harris will identify sources of guilt that drive her to caretake others  and Chingla Kishan will begin to create new self talk in order to feel less guilt and responsibilty for others and more compassion for herself  GOAL 1: Modality: Individual 2 x per month   Completion Date to be determined and The person(s) responsible for carrying out the plan is  Jose Luis Richardson (therapist) & Serena Cason ( client)    GOAL 2: Modality: Individual 2x per month   Completion Date to be determined and The person(s) responsible for carrying out the plan is  Jose Luis Richardson (therapist) & Serena Cason (client)     [address]: Diagnosis and Treatment Plan explained to Ender Castro relates understanding diagnosis and is agreeable to Treatment Plan         Client Comments : Please share your thoughts, feelings, need and/or experiences regarding your treatment plan: " I think these are good - it encapsulates what I've been trying you do for awhile "

## 2020-07-22 DIAGNOSIS — F32.A ANXIETY AND DEPRESSION: ICD-10-CM

## 2020-07-22 DIAGNOSIS — F41.9 ANXIETY AND DEPRESSION: ICD-10-CM

## 2020-07-23 ENCOUNTER — TELEMEDICINE (OUTPATIENT)
Dept: PSYCHIATRY | Facility: CLINIC | Age: 60
End: 2020-07-23
Payer: COMMERCIAL

## 2020-07-23 DIAGNOSIS — F31.70: Primary | ICD-10-CM

## 2020-07-23 DIAGNOSIS — F41.9 ANXIETY DISORDER, UNSPECIFIED TYPE: ICD-10-CM

## 2020-07-23 PROCEDURE — 99214 OFFICE O/P EST MOD 30 MIN: CPT | Performed by: NURSE PRACTITIONER

## 2020-07-23 PROCEDURE — 1036F TOBACCO NON-USER: CPT | Performed by: NURSE PRACTITIONER

## 2020-07-23 RX ORDER — FLUOXETINE HYDROCHLORIDE 40 MG/1
40 CAPSULE ORAL DAILY
Qty: 15 CAPSULE | Refills: 0 | Status: SHIPPED | OUTPATIENT
Start: 2020-07-23 | End: 2020-08-21 | Stop reason: SDUPTHER

## 2020-07-23 NOTE — PSYCH
Psychiatric Progress Note   07/23/20     Fayette Sever 64 y o  female MRN: [id number]   @ Encounter: [id number]    Visit Diagnosis:   Encounter Diagnosis     ICD-10-CM    1  Bipolar disord, currently in remis, most recent episode unsp ([facility] [address] ) F31 70    2  Anxiety disorder, unspecified type F41 9         Virtual Visit yes  Due to COVID-19 precautions and [facility] emergency, with patient's consent appointment was by phone  Spoke with Aristeo Ng  today for medication management  Pt reports taking medication as prescribed, denies side effects, reports stable positive response  Psychiatric status since the last visit: unchanged  Current Outpatient Medications:     Calcium 500-100 MG-UNIT CHEW, Chew 3  Tabs daily, Disp: , Rfl:     FLUoxetine (PROzac) 40 MG capsule, Take 1 capsule (40 mg total) by mouth daily, Disp: , Rfl:     Multiple Vitamin (MULTIVITAMIN) tablet, Take 1 tablet by mouth daily, Disp: , Rfl:     Multiple Vitamins-Minerals (BARIATRIC FUSION) CHEW, Chew daily, Disp: , Rfl:     omeprazole (PriLOSEC) 20 mg delayed release capsule, Take 1 capsule (20 mg total) by mouth daily, Disp: 90 capsule, Rfl: 0    rosuvastatin (CRESTOR) 5 mg tablet, Take 1 tablet (5 mg total) by mouth daily, Disp: 90 tablet, Rfl: 1   Sleep: normal  Appetite: normal  Medication side effects: No   ROS: N/A  Vitals Taken no  There were no vitals filed for this visit  Progress Toward Goals: stable    Assessment: pt reports doing really well  She has been taking leftover Prozac (brand), has 21 days more of it  Then she took generic fluoxetine 40 mg it was less effective, and pt had  cognitive slowing, emotional flattening, apathy, and increased obsession with anxious thoughts  Through pt's health insurance, her out of pocket cost for brand Prozac will be approx $2700 for 90 days, which pt cannot afford    Increasing generic fluoxetine to 50 mg will likely increase negative effects due to increased serotonin concentrations and resulting diminished dopamine release  Pt is going to try to extend her supply of Prozac by taking it for 2 days per week, and taking generic fluoxetine on the other 5 days, will test to see if she will be able to avoid effects noted above  Suicide/Homicide Risk Assessment: See mental Status Exam Below    Mental Status Evaluation:  Appearance:  televisit, not assessed   Behavior:  normal, pleasant, cooperative, calm   Speech:  normal rate and volume   Mood:  euthymic   Affect:  televisit, not assessed   Thought Process:  organized, logical, coherent, goal directed   Associations: intact associations   Thought Content:  normal   Perceptual Disturbances: none   Risk Potential: Suicidal ideation - None  Homicidal ideation - None  Potential for aggression - No   Sensorium:  oriented to person, place and time/date   Memory:  recent and remote memory grossly intact   Consciousness:  alert and awake   Attention/Concentration: attention span and concentration are age appropriate   Insight:  good   Judgment: good       Recommended Treatment:     Planned medication and treatment changes: All current active medications have been reviewed  Prozac 40 mg       Risks / Benefits of Treatment:    Risks, benefits, and possible side effects of medications explained to patient and patient verbalizes understanding and agreement for treatment  Counseling / Coordination of Care:    Medications, treatment progress and treatment plan reviewed with patient      KATHERINE Bower 07/23/20

## 2020-07-23 NOTE — PATIENT INSTRUCTIONS
Continue Prozac (brand) 2 days per week, take fluoxetine 40 mg (generic) 5 days epr week  Next appt: 12 weeks  Continue to see Omer Colindres for psychotherapy

## 2020-07-23 NOTE — BH TREATMENT PLAN
TREATMENT PLAN         [address] [facility]    Name and Date of Birth:  Divine Barnes 64 y o  1960    Date of Treatment Plan: July 23, 2020    Diagnosis/Diagnoses:    1  Bipolar disord, currently in remis, most recent episode unsp ([facility] [address] )    2  Anxiety disorder, unspecified type        Strengths/Personal Resources for Self-Care: taking medications as prescribed, ability to communicate well, motivation for treatment  Area/Areas of need (in own words): anxiety symptoms, depressive symptoms  1  Long Term Goal: maintain control of depression  Target Date: 2 months - 9/23/2020  Person/Persons responsible for completion of goal: Cosmo Chacon    2  Short Term Objective (s) - How will we reach this goal?:   A  Provider new recommended medication/dosage changes and/or continue medication(s): take brand Prozac 40 mg for two days each week, and generic fluoxetine 40 mg the other five days of the week to extend supply of brand  B  take medication as discussed/prescribed, attend scheduled appts  C  continue to see Carlos Gomez for psychotherapy  Target Date: 3 months - 10/23/2020  Person/Persons Responsible for Completion of Goal: Cosmo Chacon    Progress Towards Goals: stable    Treatment Modality: medication management every 12 weeks, continue psychotherapy with [facility] therapist    Review due 90 to 120 days from date of this plan: 4 months - 11/23/2020  Expected length of service: maintenance  My Physician/PA/NP and I have developed this plan together and I agree to work on the goals and objectives  I understand the treatment goals that were developed for my treatment

## 2020-07-24 RX ORDER — FLUOXETINE HYDROCHLORIDE 40 MG/1
40 CAPSULE ORAL DAILY
Start: 2020-07-24

## 2020-07-24 RX ORDER — FLUOXETINE HYDROCHLORIDE 40 MG/1
40 CAPSULE ORAL DAILY
Qty: 90 CAPSULE | Refills: 0 | Status: SHIPPED | OUTPATIENT
Start: 2020-07-24 | End: 2020-08-21 | Stop reason: ALTCHOICE

## 2020-07-28 ENCOUNTER — TELEMEDICINE (OUTPATIENT)
Dept: BEHAVIORAL/MENTAL HEALTH CLINIC | Facility: CLINIC | Age: 60
End: 2020-07-28
Payer: COMMERCIAL

## 2020-07-28 DIAGNOSIS — F31.75 BIPOLAR 1 DISORDER, DEPRESSED, PARTIAL REMISSION (HCC): Primary | ICD-10-CM

## 2020-07-28 DIAGNOSIS — F43.10 POST TRAUMATIC STRESS DISORDER (PTSD): ICD-10-CM

## 2020-07-28 PROCEDURE — 1036F TOBACCO NON-USER: CPT | Performed by: COUNSELOR

## 2020-07-28 PROCEDURE — 90834 PSYTX W PT 45 MINUTES: CPT | Performed by: COUNSELOR

## 2020-07-28 NOTE — PSYCH
Virtual Regular Visit      Assessment/Plan:    Problem List Items Addressed This Visit        Other    Bipolar 1 disorder, depressed, partial remission ([facility] [address] ) - Primary    Post traumatic stress disorder (PTSD)               Reason for visit is No chief complaint on file  Encounter provider Ana Bills [facility]    Provider located at [address]  [address]  [place] [address]  [phone]      Recent Visits  No visits were found meeting these conditions  Showing recent visits within past 7 days and meeting all other requirements     Today's Visits  Date Type Provider Dept   07/28/20 Usman Garcia [facility] Therapist [place]   Showing today's visits and meeting all other requirements     Future Appointments  No visits were found meeting these conditions  Showing future appointments within next 150 days and meeting all other requirements        The patient was identified by name and date of birth  Anabella Urias was informed that this is a telemedicine visit and that the visit is being conducted through [email or web address] and patient was informed that this is not a secure, HIPAA-complaint platform  She agrees to proceed     My office door was closed  No one else was in the room  She acknowledged consent and understanding of privacy and security of the video platform  The patient has agreed to participate and understands they can discontinue the visit at any time  Patient is aware this is a billable service  Subjective  Anabella Urias is a 64 y o  female  Discussed how she wrote her goal down so she can remember it  Began to discuss how she would like to address her son's weight gain with him  Explored her need to control and comment on other's , as well as how much of her concern was a reflection of her own issues    Client admitted that She has shame and guilt about her own weight and is struggling with losing "enough" to not be a failure  Client has strong opinions about issues and is very intelligent  Noted that she does not have many people with whom to share her thoughts other than her   States making friends is hard because "they share how great their kids are " Suggested she  Attend [facility] to meet like minded people who have " less than perfect" families  HPI     Past Medical History:   Diagnosis Date    Anxiety     Arthritis     DDD    Bipolar 1 disorder ([address] )     Colon polyp     Costochondritis     last assessed: 4/4/2016    CPAP (continuous positive airway pressure) dependence     Depression     HPV (human papilloma virus) infection 12/2017    found on colonoscopy    Kidney stone     ADRIANA (obstructive sleep apnea)     cpap level 10-15    Postgastrectomy malabsorption     Stress     Stress incontinence     Wears glasses        Past Surgical History:   Procedure Laterality Date    BILATERAL OOPHORECTOMY      COLONOSCOPY      COLONOSCOPY N/A 12/27/2017    Procedure: COLONOSCOPY;  Surgeon: Tabby Lipscomb MD;  Location: [place] [address] GI LAB;   Service: Gastroenterology    HYSTERECTOMY      partial    LIPOMA RESECTION      back    LIPOSUCTION      lipoma     DIAGNOSTIC ANOSCOPY & BIOPSY N/A 8/30/2018    Procedure: ANOSCOPY HIGH RESOLUTION;  Surgeon: Jose Castro MD;  [facility];  Service: Colorectal     DIAGNOSTIC ANOSCOPY & BIOPSY N/A 4/4/2019    Procedure: ANOSCOPY HIGH RESOLUTION;  Surgeon: Jose Castro MD;  [facility];  Service: Colorectal     DIAGNOSTIC ANOSCOPY & BIOPSY N/A 10/4/2019    Procedure: ANOSCOPY HIGH RESOLUTION;  Surgeon: Jose Castro MD;  [facility];  Service: Colorectal    IN DESTRUCTION,ANAL LESION(S),EXTENSIVE N/A 4/12/2018    Procedure: EXCISION CONDYLOMA ANAL/RECTAL;  Surgeon: Jose Castro MD;  [facility];  Service: Colorectal    IN DESTRUCTION,ANAL LESION(S),EXTENSIVE N/A 8/30/2018    Procedure: EXCISION / FULGURATION OF LESIONS;  Surgeon: Annabel Cameron MD;  [facility];  Service: Colorectal    VA LAP GASTRIC BYPASS/HEIDI-EN-Y N/A 2/17/2020    Procedure: LAPAROSCOPIC HEIDI-EN-Y GASTRIC BYPASS;  Surgeon: Mili Grajeda MD;  Location: [address] [place] [address];  Service: Bariatrics    VA SURG DIAGNOSTIC EXAM, ANORECTAL N/A 4/12/2018    Procedure: EXAM UNDER ANESTHESIA (EUA); Surgeon: Annabel Cameron MD;  [facility];  Service: Colorectal    VA SURG DIAGNOSTIC EXAM, ANORECTAL N/A 8/30/2018    Procedure: EXAM UNDER ANESTHESIA (EUA); Surgeon: Annabel Cameron MD;  [facility];  Service: Colorectal    VA SURG DIAGNOSTIC EXAM, ANORECTAL N/A 4/4/2019    Procedure: EXAM UNDER ANESTHESIA (EUA); Surgeon: Annabel Cameron MD;  [facility];  Service: Colorectal    VA SURG DIAGNOSTIC EXAM, ANORECTAL N/A 10/4/2019    Procedure: EXAM UNDER ANESTHESIA (EUA); Surgeon: Annabel Cameron MD;  [facility];  Service: Colorectal    TONSILLECTOMY      WISDOM TOOTH EXTRACTION      x4       Current Outpatient Medications   Medication Sig Dispense Refill    Calcium 500-100 MG-UNIT CHEW Chew 3  Tabs daily      FLUoxetine (PROzac) 40 MG capsule Take 1 capsule (40 mg total) by mouth daily      FLUoxetine (PROzac) 40 MG capsule Take 1 capsule (40 mg total) by mouth daily 15 capsule 0    FLUoxetine (PROzac) 40 MG capsule Take 1 capsule (40 mg total) by mouth daily 90 capsule 0    Multiple Vitamin (MULTIVITAMIN) tablet Take 1 tablet by mouth daily      Multiple Vitamins-Minerals (BARIATRIC FUSION) CHEW Chew daily      omeprazole (PriLOSEC) 20 mg delayed release capsule Take 1 capsule (20 mg total) by mouth daily 90 capsule 0    rosuvastatin (CRESTOR) 5 mg tablet Take 1 tablet (5 mg total) by mouth daily 90 tablet 1     No current facility-administered medications for this visit           Allergies   Allergen Reactions    Sulfa Antibiotics Rash       Review of Systems    Video Exam    There were no vitals filed for this visit  Physical Exam     I spent 45 minutes directly with the patient during this visit      VIRTUAL VISIT DISCLAIMER    Enoc Alarcon acknowledges that she has consented to an online visit or consultation  She understands that the online visit is based solely on information provided by her, and that, in the absence of a face-to-face physical evaluation by the physician, the diagnosis she receives is both limited and provisional in terms of accuracy and completeness  This is not intended to replace a full medical face-to-face evaluation by the physician  Enoc Alarcon understands and accepts these terms

## 2020-07-29 DIAGNOSIS — E66.01 MORBID (SEVERE) OBESITY DUE TO EXCESS CALORIES (HCC): ICD-10-CM

## 2020-08-07 RX ORDER — OMEPRAZOLE 20 MG/1
20 CAPSULE, DELAYED RELEASE ORAL DAILY
Qty: 90 CAPSULE | Refills: 0 | OUTPATIENT
Start: 2020-08-07

## 2020-08-07 NOTE — TELEPHONE ENCOUNTER
She was advised to continue PPI for 3 more months in May and then discontinue  If she is not having any issues she no longer needs to be on PPI   Thanks

## 2020-08-07 NOTE — TELEPHONE ENCOUNTER
Called and spoke to patient, she stated she is not having any symptoms and will stop med  Advised her if she did get any symptoms please call the office

## 2020-08-10 ENCOUNTER — TELEMEDICINE (OUTPATIENT)
Dept: BEHAVIORAL/MENTAL HEALTH CLINIC | Facility: CLINIC | Age: 60
End: 2020-08-10
Payer: COMMERCIAL

## 2020-08-10 DIAGNOSIS — F31.75 BIPOLAR 1 DISORDER, DEPRESSED, PARTIAL REMISSION (HCC): Primary | ICD-10-CM

## 2020-08-10 LAB
ALBUMIN SERPL-MCNC: 4.4 G/DL (ref 3.8–4.9)
ALBUMIN/GLOB SERPL: 1.9 {RATIO} (ref 1.2–2.2)
ALP SERPL-CCNC: 71 IU/L (ref 39–117)
ALT SERPL-CCNC: 44 IU/L (ref 0–32)
AST SERPL-CCNC: 31 IU/L (ref 0–40)
BASOPHILS # BLD AUTO: 0 X10E3/UL (ref 0–0.2)
BASOPHILS NFR BLD AUTO: 0 %
BILIRUB SERPL-MCNC: 0.6 MG/DL (ref 0–1.2)
BUN SERPL-MCNC: 20 MG/DL (ref 6–24)
BUN/CREAT SERPL: 27 (ref 9–23)
CALCIUM SERPL-MCNC: 9.8 MG/DL (ref 8.7–10.2)
CHLORIDE SERPL-SCNC: 98 MMOL/L (ref 96–106)
CHOLEST SERPL-MCNC: 123 MG/DL (ref 100–199)
CO2 SERPL-SCNC: 25 MMOL/L (ref 20–29)
CREAT SERPL-MCNC: 0.73 MG/DL (ref 0.57–1)
EOSINOPHIL # BLD AUTO: 0.4 X10E3/UL (ref 0–0.4)
EOSINOPHIL NFR BLD AUTO: 6 %
ERYTHROCYTE [DISTWIDTH] IN BLOOD BY AUTOMATED COUNT: 13.8 % (ref 11.7–15.4)
GLOBULIN SER-MCNC: 2.3 G/DL (ref 1.5–4.5)
GLUCOSE SERPL-MCNC: 89 MG/DL (ref 65–99)
HCT VFR BLD AUTO: 41.3 % (ref 34–46.6)
HDLC SERPL-MCNC: 61 MG/DL
HGB BLD-MCNC: 13.4 G/DL (ref 11.1–15.9)
IMM GRANULOCYTES # BLD: 0 X10E3/UL (ref 0–0.1)
IMM GRANULOCYTES NFR BLD: 0 %
LDLC SERPL CALC-MCNC: 51 MG/DL (ref 0–99)
LYMPHOCYTES # BLD AUTO: 1.9 X10E3/UL (ref 0.7–3.1)
LYMPHOCYTES NFR BLD AUTO: 27 %
MCH RBC QN AUTO: 29.5 PG (ref 26.6–33)
MCHC RBC AUTO-ENTMCNC: 32.4 G/DL (ref 31.5–35.7)
MCV RBC AUTO: 91 FL (ref 79–97)
MICRODELETION SYND BLD/T FISH: NORMAL
MONOCYTES # BLD AUTO: 0.4 X10E3/UL (ref 0.1–0.9)
MONOCYTES NFR BLD AUTO: 6 %
NEUTROPHILS # BLD AUTO: 4.2 X10E3/UL (ref 1.4–7)
NEUTROPHILS NFR BLD AUTO: 61 %
PLATELET # BLD AUTO: 195 X10E3/UL (ref 150–450)
POTASSIUM SERPL-SCNC: 4.2 MMOL/L (ref 3.5–5.2)
PROT SERPL-MCNC: 6.7 G/DL (ref 6–8.5)
RBC # BLD AUTO: 4.55 X10E6/UL (ref 3.77–5.28)
SL AMB EGFR AFRICAN AMERICAN: 106 ML/MIN/1.73
SL AMB EGFR NON AFRICAN AMERICAN: 92 ML/MIN/1.73
SODIUM SERPL-SCNC: 137 MMOL/L (ref 134–144)
TRIGL SERPL-MCNC: 56 MG/DL (ref 0–149)
TSH SERPL DL<=0.005 MIU/L-ACNC: 2.05 UIU/ML (ref 0.45–4.5)
WBC # BLD AUTO: 7 X10E3/UL (ref 3.4–10.8)

## 2020-08-10 PROCEDURE — 90834 PSYTX W PT 45 MINUTES: CPT | Performed by: COUNSELOR

## 2020-08-10 NOTE — PSYCH
Virtual Brief Visit    Assessment/Plan:    Problem List Items Addressed This Visit        Other    Bipolar 1 disorder, depressed, partial remission ([facility] [address] ) - Primary                Reason for visit is No chief complaint on file  Encounter provider Nallely Chaves [facility]    Provider located at [address]  [address]  [place] [address]  [phone]    Recent Visits  No visits were found meeting these conditions  Showing recent visits within past 7 days and meeting all other requirements     Today's Visits  Date Type Provider Dept   08/10/20 Usman Garcia [facility] Therapist [place]   Showing today's visits and meeting all other requirements     Future Appointments  No visits were found meeting these conditions  Showing future appointments within next 150 days and meeting all other requirements        After connecting through telephone, the patient was identified by name and date of birth  Darlin Mayo was informed that this is a telemedicine visit and that the visit is being conducted through telephone  My office door was closed  No one else was in the room  She acknowledged consent and understanding of privacy and security of the platform  The patient has agreed to participate and understands she can discontinue the visit at any time  Patient is aware this is a billable service  Subjective    Darlin Mayo is a 64 y o  female Sat in her car for her privacy as  was working around the house  Refusing to come in - still uncomfortable due to covid  Noted that 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 and leaving  and son home alone  Discussed ways to manage the conversation and pans to stay out of managing their conflict while she is away   Able to accept boundaries, and is looking forward to being physically away from them as they work to resolve their conflict  Noted that if son's anxiety increases he has a team of professionals who can help him  Attended a virtual [facility] meeting - found it very helpful  Planning to attend again next week  HPI      Past Medical History:   Diagnosis Date    Anxiety     Arthritis     DDD    Bipolar 1 disorder ([address] )     Colon polyp     Costochondritis     last assessed: 4/4/2016    CPAP (continuous positive airway pressure) dependence     Depression     HPV (human papilloma virus) infection 12/2017    found on colonoscopy    Kidney stone     ADRIANA (obstructive sleep apnea)     cpap level 10-15    Postgastrectomy malabsorption     Stress     Stress incontinence     Wears glasses        Past Surgical History:   Procedure Laterality Date    BILATERAL OOPHORECTOMY      COLONOSCOPY      COLONOSCOPY N/A 12/27/2017    Procedure: COLONOSCOPY;  Surgeon: Yanelis Schafer MD;  Location: [facility] GI LAB;   Service: Gastroenterology    HYSTERECTOMY      partial    LIPOMA RESECTION      back    LIPOSUCTION      lipoma     DIAGNOSTIC ANOSCOPY & BIOPSY N/A 8/30/2018    Procedure: ANOSCOPY HIGH RESOLUTION;  Surgeon: Jen Dyson MD;  [facility];  Service: Colorectal     DIAGNOSTIC ANOSCOPY & BIOPSY N/A 4/4/2019    Procedure: ANOSCOPY HIGH RESOLUTION;  Surgeon: Jen Dyson MD;  [facility];  Service: Colorectal     DIAGNOSTIC ANOSCOPY & BIOPSY N/A 10/4/2019    Procedure: ANOSCOPY HIGH RESOLUTION;  Surgeon: Jen Dyson MD;  [facility];  Service: Colorectal    NH DESTRUCTION,ANAL LESION(S),EXTENSIVE N/A 4/12/2018    Procedure: EXCISION CONDYLOMA ANAL/RECTAL;  Surgeon: Jen Dyson MD;  [facility];  Service: Colorectal    NH DESTRUCTION,ANAL LESION(S),EXTENSIVE N/A 8/30/2018    Procedure: EXCISION / FULGURATION OF LESIONS;  Surgeon: Jen Dyson MD;  [facility];  Service: Colorectal    NH LAP GASTRIC BYPASS/HEIDI-EN-Y N/A 2/17/2020    Procedure: LAPAROSCOPIC HEIDI-EN-Y GASTRIC BYPASS;  Surgeon: Darroll Mcardle, MD;  Location: [address] [place] [address];  Service: Bariatrics    AR SURG DIAGNOSTIC EXAM, ANORECTAL N/A 4/12/2018    Procedure: EXAM UNDER ANESTHESIA (EUA); Surgeon: Shelby Mcpherson MD;  [facility];  Service: Colorectal    AR SURG DIAGNOSTIC EXAM, ANORECTAL N/A 8/30/2018    Procedure: EXAM UNDER ANESTHESIA (EUA); Surgeon: Shelby Mcpherson MD;  [facility];  Service: Colorectal    AR SURG DIAGNOSTIC EXAM, ANORECTAL N/A 4/4/2019    Procedure: EXAM UNDER ANESTHESIA (EUA); Surgeon: Shelby Mcpherson MD;  [facility];  Service: Colorectal    AR SURG DIAGNOSTIC EXAM, ANORECTAL N/A 10/4/2019    Procedure: EXAM UNDER ANESTHESIA (EUA); Surgeon: Shelby Mcpherson MD;  [facility];  Service: Colorectal    TONSILLECTOMY      WISDOM TOOTH EXTRACTION      x4       Current Outpatient Medications   Medication Sig Dispense Refill    Calcium 500-100 MG-UNIT CHEW Chew 3  Tabs daily      FLUoxetine (PROzac) 40 MG capsule Take 1 capsule (40 mg total) by mouth daily      FLUoxetine (PROzac) 40 MG capsule Take 1 capsule (40 mg total) by mouth daily 15 capsule 0    FLUoxetine (PROzac) 40 MG capsule Take 1 capsule (40 mg total) by mouth daily 90 capsule 0    Multiple Vitamin (MULTIVITAMIN) tablet Take 1 tablet by mouth daily      Multiple Vitamins-Minerals (BARIATRIC FUSION) CHEW Chew daily      omeprazole (PriLOSEC) 20 mg delayed release capsule Take 1 capsule (20 mg total) by mouth daily 90 capsule 0    rosuvastatin (CRESTOR) 5 mg tablet Take 1 tablet (5 mg total) by mouth daily 90 tablet 1     No current facility-administered medications for this visit  Allergies   Allergen Reactions    Sulfa Antibiotics Rash       Review of Systems    There were no vitals filed for this visit        I spent 45 minutes directly with the patient during this visit    VIRTUAL VISIT DISCLAIMER    Nakia Haider acknowledges that she has consented to an online visit or consultation  She understands that the online visit is based solely on information provided by her, and that, in the absence of a face-to-face physical evaluation by the physician, the diagnosis she receives is both limited and provisional in terms of accuracy and completeness  This is not intended to replace a full medical face-to-face evaluation by the physician  Nakia Haider understands and accepts these terms

## 2020-08-17 LAB
25(OH)D3+25(OH)D2 SERPL-MCNC: 53.5 NG/ML (ref 30–100)
ALBUMIN SERPL-MCNC: 4.5 G/DL (ref 3.8–4.9)
ALBUMIN/GLOB SERPL: 2 {RATIO} (ref 1.2–2.2)
ALP SERPL-CCNC: 71 IU/L (ref 39–117)
ALT SERPL-CCNC: 42 IU/L (ref 0–32)
AST SERPL-CCNC: 31 IU/L (ref 0–40)
BASOPHILS # BLD AUTO: 0 X10E3/UL (ref 0–0.2)
BASOPHILS NFR BLD AUTO: 0 %
BILIRUB SERPL-MCNC: 0.5 MG/DL (ref 0–1.2)
BUN SERPL-MCNC: 18 MG/DL (ref 6–24)
BUN/CREAT SERPL: 23 (ref 9–23)
CALCIUM SERPL-MCNC: 9.8 MG/DL (ref 8.7–10.2)
CHLORIDE SERPL-SCNC: 95 MMOL/L (ref 96–106)
CO2 SERPL-SCNC: 24 MMOL/L (ref 20–29)
CREAT SERPL-MCNC: 0.8 MG/DL (ref 0.57–1)
EOSINOPHIL # BLD AUTO: 0.4 X10E3/UL (ref 0–0.4)
EOSINOPHIL NFR BLD AUTO: 6 %
ERYTHROCYTE [DISTWIDTH] IN BLOOD BY AUTOMATED COUNT: 13.4 % (ref 11.7–15.4)
FERRITIN SERPL-MCNC: 439 NG/ML (ref 15–150)
FOLATE SERPL-MCNC: 11.3 NG/ML
GLOBULIN SER-MCNC: 2.2 G/DL (ref 1.5–4.5)
GLUCOSE SERPL-MCNC: 93 MG/DL (ref 65–99)
HBA1C MFR BLD: 5.2 % (ref 4.8–5.6)
HCT VFR BLD AUTO: 41.1 % (ref 34–46.6)
HGB BLD-MCNC: 13.5 G/DL (ref 11.1–15.9)
IMM GRANULOCYTES # BLD: 0 X10E3/UL (ref 0–0.1)
IMM GRANULOCYTES NFR BLD: 0 %
IRON SATN MFR SERPL: 22 % (ref 15–55)
IRON SERPL-MCNC: 60 UG/DL (ref 27–159)
LYMPHOCYTES # BLD AUTO: 1.9 X10E3/UL (ref 0.7–3.1)
LYMPHOCYTES NFR BLD AUTO: 29 %
MCH RBC QN AUTO: 29.9 PG (ref 26.6–33)
MCHC RBC AUTO-ENTMCNC: 32.8 G/DL (ref 31.5–35.7)
MCV RBC AUTO: 91 FL (ref 79–97)
MONOCYTES # BLD AUTO: 0.4 X10E3/UL (ref 0.1–0.9)
MONOCYTES NFR BLD AUTO: 6 %
NEUTROPHILS # BLD AUTO: 4 X10E3/UL (ref 1.4–7)
NEUTROPHILS NFR BLD AUTO: 59 %
PLATELET # BLD AUTO: 200 X10E3/UL (ref 150–450)
POTASSIUM SERPL-SCNC: 4.2 MMOL/L (ref 3.5–5.2)
PROT SERPL-MCNC: 6.7 G/DL (ref 6–8.5)
PTH-INTACT SERPL-MCNC: 20 PG/ML (ref 15–65)
RBC # BLD AUTO: 4.52 X10E6/UL (ref 3.77–5.28)
SL AMB EGFR AFRICAN AMERICAN: 95 ML/MIN/1.73
SL AMB EGFR NON AFRICAN AMERICAN: 82 ML/MIN/1.73
SODIUM SERPL-SCNC: 134 MMOL/L (ref 134–144)
TIBC SERPL-MCNC: 275 UG/DL (ref 250–450)
UIBC SERPL-MCNC: 215 UG/DL (ref 131–425)
VIT A SERPL-MCNC: 35.8 UG/DL (ref 20.1–62)
VIT B1 BLD-SCNC: 172.1 NMOL/L (ref 66.5–200)
VIT B12 SERPL-MCNC: 484 PG/ML (ref 232–1245)
WBC # BLD AUTO: 6.7 X10E3/UL (ref 3.4–10.8)
ZINC SERPL-MCNC: 81 UG/DL (ref 56–134)

## 2020-08-18 NOTE — ASSESSMENT & PLAN NOTE
-Wearing CPAP nightly  -Encouraged consistent use of CPAP and follow up with sleep medicine for mask refitting/adjustments

## 2020-08-18 NOTE — ASSESSMENT & PLAN NOTE
-A1C greatly improved, now 5 2% (was 5 7%)  -Continue with healthy diet and exercise  -Has f/u with RD today

## 2020-08-18 NOTE — ASSESSMENT & PLAN NOTE
-At risk for malabsorption of vitamins/minerals secondary to malabsorption and restriction of intake from bariatric surgery  -Currently taking adequate postop bariatric surgery vitamin supplementation: Was taking Bariatric Advantage but planning to change back to Procare MVI, calcium citrate 500mg TID  -First set of bariatric labs 08/10/20 show:       -B12 (484) low normal - start additional 1,000mcg sublingual B12 daily x2-3 months       -Ferritin very elevated (439) - iron and H/H WNL, likely reflects inflammation or possibly liver (ALT mildly elevated) - advised her to discuss with PCP  -Repeat metabolic panel in 6 months, suggest repeat ferritin sooner but will defer to PCP for now  -Patient received education about the importance of adhering to a lifelong supplementation regimen to avoid vitamin/mineral deficiencies

## 2020-08-18 NOTE — ASSESSMENT & PLAN NOTE
-s/p Cornelio-En-Y Gastric Bypass with Dr Mic Rea on 02/17/20  Overall doing Well  EWL is 101%, which places the patient very ahead of schedule for expected post surgical weight loss  She has achieved a healthy BMI  She will be moving to [facility] in the near future  Initial: 226 3lbs  Current: 145 lbs per home scale  EWL: 101%  Lopez: current  Current BMI is Body mass index is 24 89 kg/m²  · Tolerating a regular diet-yes, but usually feels nausea when eating certain animal proteins; recommended fish and soft cooked ground meats  · Eating at least 60 grams of protein per day-yes  · Following 30/60 minute rule with liquids-yes  · Drinking at least 64 ounces of fluid per day-yes  · Drinking carbonated beverages-no  · Sufficient exercise-yes, walks 10,000 steps daily; advised trying HIIT   · Using NSAIDs regularly-no  · Using nicotine-no  · Using alcohol-no   Advised about the risks of alcohol s/p bariatric surgery and recommend avoiding all alcohol  · Taper off PPI

## 2020-08-18 NOTE — ASSESSMENT & PLAN NOTE
-Hx  Of bipolar   -Stable, on prozac  -Continue monitor and management with prescribing provider  -Encourage consistent f/u with therapist/counselor

## 2020-08-19 ENCOUNTER — OFFICE VISIT (OUTPATIENT)
Dept: BARIATRICS | Facility: CLINIC | Age: 60
End: 2020-08-19
Payer: COMMERCIAL

## 2020-08-19 ENCOUNTER — TELEPHONE (OUTPATIENT)
Dept: BARIATRICS | Facility: CLINIC | Age: 60
End: 2020-08-19

## 2020-08-19 VITALS — HEIGHT: 64 IN | BODY MASS INDEX: 24.75 KG/M2 | WEIGHT: 145 LBS

## 2020-08-19 VITALS — WEIGHT: 145 LBS | HEIGHT: 64 IN | BODY MASS INDEX: 24.75 KG/M2

## 2020-08-19 DIAGNOSIS — Z48.815 ENCOUNTER FOR SURGICAL AFTERCARE FOLLOWING SURGERY OF DIGESTIVE SYSTEM: Primary | ICD-10-CM

## 2020-08-19 DIAGNOSIS — K91.2 POSTSURGICAL MALABSORPTION: Primary | ICD-10-CM

## 2020-08-19 DIAGNOSIS — E53.8 LOW VITAMIN B12 LEVEL: ICD-10-CM

## 2020-08-19 DIAGNOSIS — R79.89 ELEVATED FERRITIN: ICD-10-CM

## 2020-08-19 DIAGNOSIS — G47.33 OSA (OBSTRUCTIVE SLEEP APNEA): ICD-10-CM

## 2020-08-19 DIAGNOSIS — F41.9 ANXIETY AND DEPRESSION: ICD-10-CM

## 2020-08-19 DIAGNOSIS — E78.2 MIXED HYPERLIPIDEMIA: ICD-10-CM

## 2020-08-19 DIAGNOSIS — K91.2 POSTSURGICAL MALABSORPTION: ICD-10-CM

## 2020-08-19 DIAGNOSIS — R73.01 IFG (IMPAIRED FASTING GLUCOSE): ICD-10-CM

## 2020-08-19 DIAGNOSIS — F32.A ANXIETY AND DEPRESSION: ICD-10-CM

## 2020-08-19 PROCEDURE — 1036F TOBACCO NON-USER: CPT | Performed by: PHYSICIAN ASSISTANT

## 2020-08-19 PROCEDURE — RECHECK

## 2020-08-19 PROCEDURE — 99214 OFFICE O/P EST MOD 30 MIN: CPT | Performed by: PHYSICIAN ASSISTANT

## 2020-08-19 PROCEDURE — 3008F BODY MASS INDEX DOCD: CPT | Performed by: FAMILY MEDICINE

## 2020-08-19 PROCEDURE — 3008F BODY MASS INDEX DOCD: CPT | Performed by: PHYSICIAN ASSISTANT

## 2020-08-19 RX ORDER — GUAIFENESIN/PHENYLPROPANOLAMIN
1 EXPECTORANT ORAL DAILY
COMMUNITY
End: 2021-09-08

## 2020-08-19 NOTE — PROGRESS NOTES
Virtual Regular Visit      Assessment/Plan:    Problem List Items Addressed This Visit        Digestive    Postsurgical malabsorption     -At risk for malabsorption of vitamins/minerals secondary to malabsorption and restriction of intake from bariatric surgery  -Currently taking adequate postop bariatric surgery vitamin supplementation: Was taking Bariatric Advantage but planning to change back to Procare MVI, calcium citrate 500mg TID  -First set of bariatric labs 08/10/20 show:       -B12 (484) low normal - start additional 1,000mcg sublingual B12 daily x2-3 months       -Ferritin very elevated (439) - iron and H/H WNL, likely reflects inflammation or possibly liver (ALT mildly elevated) - advised her to discuss with PCP  -Repeat metabolic panel in 6 months, suggest repeat ferritin sooner but will defer to PCP for now  -Patient received education about the importance of adhering to a lifelong supplementation regimen to avoid vitamin/mineral deficiencies          Relevant Orders    CBC and differential    Comprehensive metabolic panel    Copper Level    Ferritin    Folate    Iron Saturation %    Zinc    Vitamin D 25 hydroxy    Vitamin B12    Vitamin B1, whole blood    Vitamin A    PTH, intact       Endocrine    IFG (impaired fasting glucose)     -A1C greatly improved, now 5 2% (was 5 7%)  -Continue with healthy diet and exercise  -Has f/u with RD today            Respiratory    ADRIANA (obstructive sleep apnea)     -Wearing CPAP nightly  -Encouraged consistent use of CPAP and follow up with sleep medicine for mask refitting/adjustments             Other    Anxiety and depression     -Hx   Of bipolar   -Stable, on prozac  -Continue monitor and management with prescribing provider  -Encourage consistent f/u with therapist/counselor         Mixed hyperlipidemia     -On statin  -Lipid panel on 08/10/20 WNL         Encounter for surgical aftercare following surgery of digestive system - Primary     -s/p Cornelio-En-Y Gastric Bypass with Dr Kane Maria on 02/17/20  Overall doing Well  EWL is 101%, which places the patient very ahead of schedule for expected post surgical weight loss  She has achieved a healthy BMI  She will be moving to [facility] in the near future  Initial: 226 3lbs  Current: 145 lbs per home scale  EWL: 101%  Lopez: current  Current BMI is Body mass index is 24 89 kg/m²  · Tolerating a regular diet-yes, but usually feels nausea when eating certain animal proteins; recommended fish and soft cooked ground meats  · Eating at least 60 grams of protein per day-yes  · Following 30/60 minute rule with liquids-yes  · Drinking at least 64 ounces of fluid per day-yes  · Drinking carbonated beverages-no  · Sufficient exercise-yes, walks 10,000 steps daily; advised trying HIIT   · Using NSAIDs regularly-no  · Using nicotine-no  · Using alcohol-no  Advised about the risks of alcohol s/p bariatric surgery and recommend avoiding all alcohol  · Taper off PPI              Other Visit Diagnoses     Low vitamin B12 level        Relevant Orders    Vitamin B12    Elevated ferritin        Relevant Orders    Ferritin               Reason for visit is   Chief Complaint   Patient presents with    Virtual Regular Visit     Pt is having virtual visit with Nura Saleh  Encounter provider Zonia Portillo PA-C    Provider located at [address] [place] [address]  [facility]  [address] [place] [id number] [phone]      Recent Visits  No visits were found meeting these conditions  Showing recent visits within past 7 days and meeting all other requirements     Today's Visits  Date Type Provider Dept   08/19/20 Office Visit Amara Molina RD [facility]   08/19/20 Office Visit Zonia Portillo PA-C [facility]   Showing today's visits and meeting all other requirements     Future Appointments  No visits were found meeting these conditions     Showing future appointments within next 150 days and meeting all other requirements        The patient was identified by name and date of birth  Odalis Ragsdale was informed that this is a telemedicine visit and that the visit is being conducted through [address] [place] [address] and patient was informed that this is not a secure, HIPAA-complaint platform  She agrees to proceed     My office door was closed  No one else was in the room  She acknowledged consent and understanding of privacy and security of the video platform  The patient has agreed to participate and understands they can discontinue the visit at any time  Patient is aware this is a billable service  Subjective  Odalis Ragsdale is a 64 y o  female s/p Cornelio-En-Y Gastric Bypass with Dr Jackson Burgess on 02/17/20  She is feeling amazing and has no complaints other than not tolerating some types of animal proteins  She is walking daily, taking all vitamins, drinking adequate fluids now, and following all recommendations  She is moving to [facility] in the next few months  She is up to date with colonoscopy - last one was 2017 and she is due for f/u in 10 years  HPI     Past Medical History:   Diagnosis Date    Anxiety     Arthritis     DDD    Bipolar 1 disorder ([facility] [address] )     Colon polyp     Costochondritis     last assessed: 4/4/2016    CPAP (continuous positive airway pressure) dependence     Depression     HPV (human papilloma virus) infection 12/2017    found on colonoscopy    Kidney stone     ADRIANA (obstructive sleep apnea)     cpap level 10-15    Postgastrectomy malabsorption     Stress     Stress incontinence     Wears glasses        Past Surgical History:   Procedure Laterality Date    BILATERAL OOPHORECTOMY      COLONOSCOPY      COLONOSCOPY N/A 12/27/2017    Procedure: COLONOSCOPY;  Surgeon: Toby Cox MD;  Location: [place] [address] [facility] LAB;   Service: Gastroenterology    HYSTERECTOMY      partial    LIPOMA RESECTION      back    LIPOSUCTION      lipoma     DIAGNOSTIC ANOSCOPY & BIOPSY N/A 8/30/2018    Procedure: ANOSCOPY HIGH RESOLUTION;  Surgeon: Asha Mckeon MD;  [facility];  Service: Colorectal     DIAGNOSTIC ANOSCOPY & BIOPSY N/A 4/4/2019    Procedure: ANOSCOPY HIGH RESOLUTION;  Surgeon: Asha Mckeon MD;  [facility];  Service: Colorectal     DIAGNOSTIC ANOSCOPY & BIOPSY N/A 10/4/2019    Procedure: ANOSCOPY HIGH RESOLUTION;  Surgeon: Asha Mckeon MD;  [facility];  Service: Colorectal    FL DESTRUCTION,ANAL LESION(S),EXTENSIVE N/A 4/12/2018    Procedure: EXCISION CONDYLOMA ANAL/RECTAL;  Surgeon: Asha Mckeon MD;  [facility];  Service: Colorectal    FL DESTRUCTION,ANAL LESION(S),EXTENSIVE N/A 8/30/2018    Procedure: EXCISION / FULGURATION OF LESIONS;  Surgeon: Asha Mckeon MD;  [facility];  Service: Colorectal    FL LAP GASTRIC BYPASS/HEIDI-EN-Y N/A 2/17/2020    Procedure: LAPAROSCOPIC HEIDI-EN-Y GASTRIC BYPASS;  Surgeon: Radha Coffey MD;  Location: [address] [place] [address];  Service: Bariatrics    FL SURG DIAGNOSTIC EXAM, ANORECTAL N/A 4/12/2018    Procedure: EXAM UNDER ANESTHESIA (EUA); Surgeon: Asha Mckeon MD;  [facility];  Service: Colorectal    FL SURG DIAGNOSTIC EXAM, ANORECTAL N/A 8/30/2018    Procedure: EXAM UNDER ANESTHESIA (EUA); Surgeon: Asha Mckeon MD;  [facility];  Service: Colorectal    FL SURG DIAGNOSTIC EXAM, ANORECTAL N/A 4/4/2019    Procedure: EXAM UNDER ANESTHESIA (EUA); Surgeon: Asha Mckeon MD;  [facility];  Service: Colorectal    FL SURG DIAGNOSTIC EXAM, ANORECTAL N/A 10/4/2019    Procedure: EXAM UNDER ANESTHESIA (EUA);   Surgeon: Asha Mckeon MD;  [facility];  Service: Colorectal    TONSILLECTOMY      WISDOM TOOTH EXTRACTION      x4       Current Outpatient Medications   Medication Sig Dispense Refill    Calcium 500-100 MG-UNIT CHEW Chew 3  Tabs daily      Coenzyme Q10 (CoQ-10) 100 MG CAPS Take 1 capsule by mouth daily      COLLAGEN PO Take 1 tablet by mouth daily      FLUoxetine (PROzac) 40 MG capsule Take 1 capsule (40 mg total) by mouth daily      Multiple Vitamins-Minerals (BARIATRIC FUSION) CHEW Chew 1 tablet daily       Polyethylene Glycol 3350 (MIRALAX PO) Take by mouth daily      rosuvastatin (CRESTOR) 5 mg tablet Take 1 tablet (5 mg total) by mouth daily 90 tablet 1    Saw Palmetto 500 MG CAPS Take 1 capsule by mouth daily      FLUoxetine (PROzac) 40 MG capsule Take 1 capsule (40 mg total) by mouth daily (Patient not taking: Reported on 8/19/2020) 15 capsule 0    FLUoxetine (PROzac) 40 MG capsule Take 1 capsule (40 mg total) by mouth daily 90 capsule 0    Multiple Vitamin (MULTIVITAMIN) tablet Take 1 tablet by mouth daily      omeprazole (PriLOSEC) 20 mg delayed release capsule Take 1 capsule (20 mg total) by mouth daily 90 capsule 0     No current facility-administered medications for this visit  Allergies   Allergen Reactions    Sulfa Antibiotics Rash       Review of Systems   Constitutional: Negative for chills and fever  Unexpected weight change: planned weight loss  HENT: Negative for trouble swallowing  Respiratory: Negative for cough and shortness of breath  Cardiovascular: Negative for chest pain and palpitations  Gastrointestinal: Negative for abdominal pain, constipation, diarrhea, nausea and vomiting  Neurological: Negative for dizziness     Psychiatric/Behavioral:        Anxiety and depression well controlled       Video Exam:  GEN: awake, alert, non-diaphoretic, no psychomotor agitation, no acute distress    HEENT :Head: atraumatic, normocephalic, no rashes noted, no lesions noted    Eyes: NO redness, discharge, swelling, or lesions    Nose: NO redness, swelling, discharge, deformity, or impetigo/crusting    Skin: no lesions, wounds, erythema, or cyanosis noted on face or hands    Cardiopulmonary: no increased respiratory effort, speaking in clear sentences, I:E ratio WNL    Abdomen; soft, non-distended, non-tender to guided self exam    Neuro: speech normal rate and rhythm, orientation arrived to appointment on time with no prompting, moved both upper extremities equally    Pysch: appearance, behavior, and attitude- well groomed, pleasant, cooperative      Vitals:    08/19/20 1006   Weight: 65 8 kg (145 lb)   Height: 5' 4" (1 626 m)   per home scale    Physical Exam     I spent 25 minutes with patient today in which greater than 50% of the time was spent in counseling/coordination of care regarding post op progression, weight loss, nutrition, vitamins      VIRTUAL VISIT DISCLAIMER    Ronak Carlos acknowledges that she has consented to an online visit or consultation  She understands that the online visit is based solely on information provided by her, and that, in the absence of a face-to-face physical evaluation by the physician, the diagnosis she receives is both limited and provisional in terms of accuracy and completeness  This is not intended to replace a full medical face-to-face evaluation by the physician  Ronak Carlos understands and accepts these terms

## 2020-08-19 NOTE — PATIENT INSTRUCTIONS
GOALS:   · Continued/Maintain healthy weight loss with good nutrition intakes  · Adequate hydration with at least 64oz  fluid intake  · Normal vitamin and mineral levels  · Exercise as tolerated  · Take an additional 1,000mcg sublingual (under the tongue) B12 daily for 2-3 months   · Speak with Dr Job Gunter about elevated Ferritin and mildly elevated Liver enzyme - update me   · You are doing so incredibly well!! Keep up the great work and enjoy your new adventure in [place]! · Follow-up in 6 months  We kindly ask that your arrive 15 minutes before your scheduled appointment time with your provider to allow our staff to room you, get your vital signs and update your chart  · Follow diet as discussed  · Get lab work done in February prior to your annual visit  You have been given a lab slip today  Please call the office if you need a replacement  It is recommended to check with your insurance BEFORE getting labs done to make sure they are covered by your policy  Also, please check with your PCP and other providers before getting labs to avoid duplicate labs  Make sure to HOLD any multivitamins that may contain biotin and any biotin supplements FOR 5 DAYS before any labs since it can affect the results  · Follow vitamin and mineral recommendations as reviewed with you  · Call our office if you have any problems with abdominal pain especially associated with fever, chills, nausea, vomiting or any other concerns  · All  Post-bariatric surgery patients should be aware that very small quantities of any alcohol can cause impairment and it is very possible not to feel the effect  The effect can be in the system for several hours  It is also a stomach irritant  · It is advised to AVOID alcohol, Nonsteroidal antiinflammatory drugs (NSAIDS) and nicotine of all forms   Any of these can cause stomach irritation/pain

## 2020-08-19 NOTE — PROGRESS NOTES
Bariatric Follow Up Nutrition Note    Phone:    i  Name was verified by patient stating name? yes  ii   verified by patient stating? yes  iii  You verified the patient is alone? yes  iv  I would like to verify that you were offered a live visit but are now consenting to this telephone visit? yes  v  This visit is free yes      Type of surgery  Gastric bypass: laparoscopic  Surgery Date: 20  3 months  post-op  Surgeon: Dr Tony Rivers  64 y o   female  Height 5' 4" (1 626 m), weight 65 8 kg (145 lb)  Body mass index is 24 89 kg/m²  Eval:  242 2lbs  Weight on Day of Weight Loss Surgery: 224 5#  Weight in (lb) to have BMI = 25: 146 4#   Pre-Op Excess Wt: 96  Post-Op Wt Loss: 97#/ 101% EBWL in 6 month(s)  Moving to [place] in the next year    Review of History and Medications   Past Medical History:   Diagnosis Date    Anxiety     Arthritis     DDD    Bipolar 1 disorder ([facility] [address]     Colon polyp     Costochondritis     last assessed: 2016    CPAP (continuous positive airway pressure) dependence     Depression     HPV (human papilloma virus) infection 2017    found on colonoscopy    Kidney stone     ADRIANA (obstructive sleep apnea)     cpap level 10-15    Postgastrectomy malabsorption     Stress     Stress incontinence     Wears glasses      Past Surgical History:   Procedure Laterality Date    BILATERAL OOPHORECTOMY      COLONOSCOPY      COLONOSCOPY N/A 2017    Procedure: COLONOSCOPY;  Surgeon: Julio C Abel MD;  Location: [place] [address] [facility] LAB;   Service: Gastroenterology    HYSTERECTOMY      partial    LIPOMA RESECTION      back    LIPOSUCTION      lipoma     DIAGNOSTIC ANOSCOPY & BIOPSY N/A 2018    Procedure: ANOSCOPY HIGH RESOLUTION;  Surgeon: Jeremy Norton MD;  [facility];  Service: Colorectal     DIAGNOSTIC ANOSCOPY & BIOPSY N/A 2019    Procedure: ANOSCOPY HIGH RESOLUTION;  Surgeon: Jeremy Norton MD;  [facility];  Service: Colorectal    [id number] DIAGNOSTIC ANOSCOPY & BIOPSY N/A 10/4/2019    Procedure: ANOSCOPY HIGH RESOLUTION;  Surgeon: Abad Barroso MD;  [facility];  Service: Colorectal    SD DESTRUCTION,ANAL LESION(S),EXTENSIVE N/A 4/12/2018    Procedure: EXCISION CONDYLOMA ANAL/RECTAL;  Surgeon: Abad Barroso MD;  [facility];  Service: Colorectal    SD DESTRUCTION,ANAL LESION(S),EXTENSIVE N/A 8/30/2018    Procedure: EXCISION / FULGURATION OF LESIONS;  Surgeon: Abad Barroso MD;  [facility];  Service: Colorectal    SD LAP GASTRIC BYPASS/HEIDI-EN-Y N/A 2/17/2020    Procedure: LAPAROSCOPIC HEIDI-EN-Y GASTRIC BYPASS;  Surgeon: Phil Garcia MD;  [facility]: [facility];  Service: [facility], ANORECTAL N/A 4/12/2018    Procedure: EXAM UNDER ANESTHESIA (EUA); Surgeon: Abad Barroso MD;  [facility];  Service: Colorectal    SD SURG DIAGNOSTIC EXAM, ANORECTAL N/A 8/30/2018    Procedure: EXAM UNDER ANESTHESIA (EUA); Surgeon: Abad Barroso MD;  [facility];  Service: Colorectal    SD SURG DIAGNOSTIC EXAM, ANORECTAL N/A 4/4/2019    Procedure: EXAM UNDER ANESTHESIA (EUA); Surgeon: Abad Barroso MD;  [facility];  Service: Colorectal    SD SURG DIAGNOSTIC EXAM, ANORECTAL N/A 10/4/2019    Procedure: EXAM UNDER ANESTHESIA (EUA);   Surgeon: Abad Barroso MD;  [facility];  Service: Colorectal    TONSILLECTOMY      WISDOM TOOTH EXTRACTION      x4     Social History     Socioeconomic History    Marital status: /Civil Union     Spouse name: Not on file    Number of children: Not on file    Years of education: Not on file    Highest education level: Not on file   Occupational History    Not on file   Social Needs    Financial resource strain: Not on file    Food insecurity     Worry: Not on file     Inability: Not on file    Transportation needs     Medical: Not on file Non-medical: Not on file   Tobacco Use    Smoking status: Former Smoker     Packs/day: 0 25     Years: 10 00     Pack years: 2 50     Types: Cigarettes, Cigars     Start date: 2008     Last attempt to quit: 2018     Years since quittin 1    Smokeless tobacco: Never Used    Tobacco comment: quit 2018   Substance and Sexual Activity    Alcohol use: Not Currently     Alcohol/week: 2 0 standard drinks     Types: 1 Glasses of wine, 1 Cans of beer per week     Frequency: 2-4 times a month     Drinks per session: 1 or 2     Binge frequency: Never     Comment: socially    Drug use: No    Sexual activity: Yes     Partners: Male     Birth control/protection: Post-menopausal   Lifestyle    Physical activity     Days per week: Not on file     Minutes per session: Not on file    Stress: Not on file   Relationships    Social connections     Talks on phone: Not on file     Gets together: Not on file     Attends Pent[employer]alism service: Not on file     Active member of club or organization: Not on file     Attends meetings of clubs or organizations: Not on file     Relationship status: Not on file    Intimate partner violence     Fear of current or ex partner: Not on file     Emotionally abused: Not on file     Physically abused: Not on file     Forced sexual activity: Not on file   Other Topics Concern    Not on file   Social History Narrative    Not on file       Current Outpatient Medications:     Calcium 500-100 MG-UNIT CHEW, Chew 3  Tabs daily, Disp: , Rfl:     FLUoxetine (PROzac) 40 MG capsule, Take 1 capsule (40 mg total) by mouth daily, Disp: , Rfl:     FLUoxetine (PROzac) 40 MG capsule, Take 1 capsule (40 mg total) by mouth daily, Disp: 15 capsule, Rfl: 0    FLUoxetine (PROzac) 40 MG capsule, Take 1 capsule (40 mg total) by mouth daily, Disp: 90 capsule, Rfl: 0    Multiple Vitamin (MULTIVITAMIN) tablet, Take 1 tablet by mouth daily, Disp: , Rfl:     Multiple Vitamins-Minerals (BARIATRIC FUSION) CHEW, Chew daily, Disp: , Rfl:     omeprazole (PriLOSEC) 20 mg delayed release capsule, Take 1 capsule (20 mg total) by mouth daily, Disp: 90 capsule, Rfl: 0    rosuvastatin (CRESTOR) 5 mg tablet, Take 1 tablet (5 mg total) by mouth daily, Disp: 90 tablet, Rfl: 1    Food Intake and Lifestyle Assessment   Food Intake Assessment completed via [employer] jason  2 cups of decaf tea  Was waiting 30 mins, but since struggling with getting fluids jack advised to wait only 15mins  Keeping food lo-950 cals, 70-82gm protein  Breakfast: 1 greek yogurt or 1/2 cup cottage cheese w/fruit or protein shake  Snack: [id number] or protein shake   Lunch: 2 wedges of laughing cow cheese, a few crackers and cucumber  Snack: which ever didn't have in the  Morning-protein water or protein sahke  Dinner: beets and cheese stick  Snack: SF ice pop or fudge bar (1-5)  This is her harder time of the day  Suggested more fruit instead  Beverage intake: water w/flavoring and protein water and protein shake  Diet texture/stage: regular  Protein supplement: protein 2O and Premier   One a day  Estimated protein intake per day: 60-70gm  Estimated fluid intake per day: 64oz water  Meals eaten away from home: none  Typical meal pattern: 3 meals per day and 1-2 snacks per day  Eating Behaviors: Appropriate diet advancement, Appropriate portion sizes and Does not drink with meals and waits 30-minutes after meal before resuming drinking    Vitamins:  Now taking bariatric fusion 4 per day multi (was taking the procare)  Collagen, CoQ10, saw [place]  citracal max 2 per day, she just realized needs to be doing 4 per day so will change back to BA chewy bites    Food allergies or intolerances: meat protein (chicken/beef)--can only tolerate 1-2 bites  Cultural or Yazidi considerations: none    Physical Assessment  Nutrition Related Findings  Loss of Hunger  Dumping-ice cream/sugar  Vomiting-once in the past 1 5 months with [place] food Physical Activity  Types of exercise: Walking-12,000 steps per day  Current physical limitations: none    Psychosocial Assessment   Support systems: friend(s) relative(s) children  Socioeconomic factors: none    Nutrition Diagnosis  Diagnosis: Altered GI function (NC-1 4)  Related to: Altered GI function  As Evidenced by: Unintentional weight loss (planned)    Interventions and Teaching   Patient educated on post-op nutrition guidelines  Patient educated and handouts provided  Diet progression  Adequate hydration  Expected weight loss  Weight loss plateaus/ possibility of weight regain  Exercise  Nutrition considerations after surgery  Protein supplements  Meal planning and preparation  Dietary and lifestyle changes  Techniques for self monitoring and keeping daily food journal  Vitamin / Mineral supplementation of Multivitamin with minerals and Calcium    Education provided to: patient    Barriers to learning: No barriers identified    Readiness to change: action    Comprehension: demonstrated understanding and verbalizes understanding     Expected Compliance: good    Pt has done amazing with her weight loss losing 101% of her EBW at 6 months  She is logging her intake, meeting 70-80gm protein per day and most often eating 3 meals per day  She does have an occasional snack of popcorn or SF ice pops, but eating multiple at one time  Suggested some other options to be more satisfying  Encouraged more protein based snacks  Pt is drinking one protein shake and one protein water per day to help meet her needs    Will f/u in 3 months    Goals  10,000 steps per day  Eat 3 meals per day + 1 protein snack per day  consume 70-80gm protein  64oz fluids per day  F/U in 3 months     Time Spent:   30 Minutes

## 2020-08-21 ENCOUNTER — TELEMEDICINE (OUTPATIENT)
Dept: FAMILY MEDICINE CLINIC | Facility: CLINIC | Age: 60
End: 2020-08-21
Payer: COMMERCIAL

## 2020-08-21 DIAGNOSIS — R79.89 ELEVATED LIVER FUNCTION TESTS: Primary | ICD-10-CM

## 2020-08-21 DIAGNOSIS — E78.2 MIXED HYPERLIPIDEMIA: ICD-10-CM

## 2020-08-21 PROCEDURE — 3725F SCREEN DEPRESSION PERFORMED: CPT | Performed by: FAMILY MEDICINE

## 2020-08-21 PROCEDURE — 1036F TOBACCO NON-USER: CPT | Performed by: FAMILY MEDICINE

## 2020-08-21 PROCEDURE — 99213 OFFICE O/P EST LOW 20 MIN: CPT | Performed by: FAMILY MEDICINE

## 2020-08-21 NOTE — PROGRESS NOTES
Virtual Brief Visit    Assessment/Plan:    Problem List Items Addressed This Visit        Other    Mixed hyperlipidemia    Relevant Orders    Comprehensive metabolic panel    Lipid Panel with Direct LDL reflex      Other Visit Diagnoses     Elevated liver function tests    -  Primary    Relevant Orders    US liver    Comprehensive metabolic panel    Lipid Panel with Direct LDL reflex        BUN/CR is acceptable    Ferritin is elevated - advised to take a MVI with a lower iron content        Reason for visit is   Chief Complaint   Patient presents with    Follow-up     review [address]    Virtual Brief Visit        Encounter provider Huyen Franco DO    Provider located at  [address] [place] [address]  [address]  [place] [address]    Recent Visits  No visits were found meeting these conditions  Showing recent visits within past 7 days and meeting all other requirements     Today's Visits  Date Type Provider Dept   08/21/20 181 [facility], [address] today's visits and meeting all other requirements     Future Appointments  No visits were found meeting these conditions  Showing future appointments within next 150 days and meeting all other requirements        After connecting through telephone, the patient was identified by name and date of birth  William Cavazos was informed that this is a telemedicine visit and that the visit is being conducted through telephone  My office door was closed  No one else was in the room  She acknowledged consent and understanding of privacy and security of the platform  The patient has agreed to participate and understands she can discontinue the visit at any time  Patient is aware this is a billable service  Subjective    William Cavazos is a 64 y o  female     Pt states she had labs for me and labs for her bariatric team  Pt states the bariatric team wanted me to look at her lipids her BUN and elevated LFT's         Past Medical History:   Diagnosis Date    Anxiety     Arthritis     DDD    Bipolar 1 disorder ([address] )     Colon polyp     Costochondritis     last assessed: 4/4/2016    CPAP (continuous positive airway pressure) dependence     Depression     HPV (human papilloma virus) infection 12/2017    found on colonoscopy    Kidney stone     ADRIANA (obstructive sleep apnea)     cpap level 10-15    Postgastrectomy malabsorption     Stress     Stress incontinence     Wears glasses        Past Surgical History:   Procedure Laterality Date    BILATERAL OOPHORECTOMY      COLONOSCOPY      COLONOSCOPY N/A 12/27/2017    Procedure: COLONOSCOPY;  Surgeon: Lindsay Addison MD;  [facility];   Service: Gastroenterology    HYSTERECTOMY      partial    LIPOMA RESECTION      back    LIPOSUCTION      lipoma     DIAGNOSTIC ANOSCOPY & BIOPSY N/A 8/30/2018    Procedure: ANOSCOPY HIGH RESOLUTION;  Surgeon: Simran Fields MD;  [facility];  Service: Colorectal     DIAGNOSTIC ANOSCOPY & BIOPSY N/A 4/4/2019    Procedure: ANOSCOPY HIGH RESOLUTION;  Surgeon: Simran Fields MD;  [facility];  Service: Colorectal     DIAGNOSTIC ANOSCOPY & BIOPSY N/A 10/4/2019    Procedure: ANOSCOPY HIGH RESOLUTION;  Surgeon: Simran Fields MD;  [facility];  Service: Colorectal    VA DESTRUCTION,ANAL LESION(S),EXTENSIVE N/A 4/12/2018    Procedure: EXCISION CONDYLOMA ANAL/RECTAL;  Surgeon: Simran Fields MD;  [facility];  Service: Colorectal    VA DESTRUCTION,ANAL LESION(S),EXTENSIVE N/A 8/30/2018    Procedure: EXCISION / FULGURATION OF LESIONS;  Surgeon: Simran Fields MD;  [facility];  Service: Colorectal    VA LAP GASTRIC BYPASS/HEIDI-EN-Y N/A 2/17/2020    Procedure: LAPAROSCOPIC HEIDI-EN-Y GASTRIC BYPASS;  Surgeon: Colt Maradiaga MD;  Location: [facility];  Service: [facility]note 2], ANORECTAL N/A 4/12/2018 Procedure: EXAM UNDER ANESTHESIA (EUA); Surgeon: Nick Crews MD;  [facility];  Service: Colorectal    OH SURG DIAGNOSTIC EXAM, ANORECTAL N/A 8/30/2018    Procedure: EXAM UNDER ANESTHESIA (EUA); Surgeon: Nick Crews MD;  [facility];  Service: Colorectal    OH SURG DIAGNOSTIC EXAM, ANORECTAL N/A 4/4/2019    Procedure: EXAM UNDER ANESTHESIA (EUA); Surgeon: Nick Crews MD;  [facility];  Service: Colorectal    OH SURG DIAGNOSTIC EXAM, ANORECTAL N/A 10/4/2019    Procedure: EXAM UNDER ANESTHESIA (EUA); Surgeon: Nick Crews MD;  [facility];  Service: Colorectal    TONSILLECTOMY      WISDOM TOOTH EXTRACTION      x4       Current Outpatient Medications   Medication Sig Dispense Refill    Calcium 500-100 MG-UNIT CHEW Chew 3  Tabs daily      Coenzyme Q10 (CoQ-10) 100 MG CAPS Take 1 capsule by mouth daily      COLLAGEN PO Take 1 tablet by mouth daily      FLUoxetine (PROzac) 40 MG capsule Take 1 capsule (40 mg total) by mouth daily      Multiple Vitamins-Minerals (BARIATRIC FUSION) CHEW Chew 1 tablet daily       Polyethylene Glycol 3350 (MIRALAX PO) Take by mouth daily      Saw Palmetto 500 MG CAPS Take 1 capsule by mouth daily       No current facility-administered medications for this visit  Allergies   Allergen Reactions    Sulfa Antibiotics Rash       Review of Systems   Constitutional: Negative  Negative for activity change, appetite change, chills, diaphoresis and fatigue  HENT: Negative  Negative for dental problem, ear pain, sinus pressure and sore throat  Eyes: Negative  Negative for photophobia, pain, discharge, redness, itching and visual disturbance  Respiratory: Negative for apnea and chest tightness  Cardiovascular: Negative  Negative for chest pain, palpitations and leg swelling  Gastrointestinal: Negative  Negative for abdominal distention, abdominal pain, constipation and diarrhea     Endocrine: Negative  Negative for cold intolerance and heat intolerance  Genitourinary: Negative  Negative for difficulty urinating and dyspareunia  Musculoskeletal: Negative  Negative for arthralgias and back pain  Skin: Negative  Allergic/Immunologic: Negative for environmental allergies  Neurological: Negative  Negative for dizziness  Psychiatric/Behavioral: Negative  Negative for agitation  There were no vitals filed for this visit  I spent 10 minutes directly with the patient during this visit    VIRTUAL VISIT DISCLAIMER    Delroy Chavez acknowledges that she has consented to an online visit or consultation  She understands that the online visit is based solely on information provided by her, and that, in the absence of a face-to-face physical evaluation by the physician, the diagnosis she receives is both limited and provisional in terms of accuracy and completeness  This is not intended to replace a full medical face-to-face evaluation by the physician  Delroy Chavez understands and accepts these terms      Recent Results (from the past 672 hour(s))   CBC and differential    Collection Time: 08/10/20  9:31 AM   Result Value Ref Range    White Blood Cell Count 7 0 3 4 - 10 8 x10E3/uL    Red Blood Cell Count 4 55 3 77 - 5 28 x10E6/uL    Hemoglobin 13 4 11 1 - 15 9 g/dL    HCT 41 3 34 0 - 46 6 %    MCV 91 79 - 97 fL    MCH 29 5 26 6 - 33 0 pg    MCHC 32 4 31 5 - 35 7 g/dL    RDW 13 8 11 7 - 15 4 %    Platelet Count 226 519 - 450 x10E3/uL    Neutrophils 61 Not Estab  %    Lymphocytes 27 Not Estab  %    Monocytes 6 Not Estab  %    Eosinophils 6 Not Estab  %    Basophils PCT 0 Not Estab  %    Neutrophils (Absolute) 4 2 1 4 - 7 0 x10E3/uL    Lymphocytes (Absolute) 1 9 0 7 - 3 1 x10E3/uL    Monocytes (Absolute) 0 4 0 1 - 0 9 x10E3/uL    Eosinophils (Absolute) 0 4 0 0 - 0 4 x10E3/uL    Basophils ABS 0 0 0 0 - 0 2 x10E3/uL    Immature Granulocytes 0 Not Estab  %    Immature Granulocytes (Absolute) 0 0 0 0 - 0 1 x10E3/uL   Comprehensive metabolic panel    Collection Time: 08/10/20  9:31 AM   Result Value Ref Range    Glucose, Random 89 65 - 99 mg/dL    BUN 20 6 - 24 mg/dL    Creatinine 0 73 0 57 - 1 00 mg/dL    eGFR Non  92 >59 mL/min/1 73    eGFR  106 >59 mL/min/1 73    SL AMB BUN/CREATININE RATIO 27 (H) 9 - 23    Sodium 137 134 - 144 mmol/L    Potassium 4 2 3 5 - 5 2 mmol/L    Chloride 98 96 - 106 mmol/L    CO2 25 20 - 29 mmol/L    CALCIUM 9 8 8 7 - 10 2 mg/dL    Protein, Total 6 7 6 0 - 8 5 g/dL    Albumin 4 4 3 8 - 4 9 g/dL    Globulin, Total 2 3 1 5 - 4 5 g/dL    Albumin/Globulin Ratio 1 9 1 2 - 2 2    TOTAL BILIRUBIN 0 6 0 0 - 1 2 mg/dL    Alk Phos Isoenzymes 71 39 - 117 IU/L    AST 31 0 - 40 IU/L    ALT 44 (H) 0 - 32 IU/L   Lipid panel    Collection Time: 08/10/20  9:31 AM   Result Value Ref Range    Cholesterol, Total 123 100 - 199 mg/dL    Triglycerides 56 0 - 149 mg/dL    HDL 61 >39 mg/dL    LDL Calculated 51 0 - 99 mg/dL   TSH, 3rd generation    Collection Time: 08/10/20  9:31 AM   Result Value Ref Range    TSH 2 050 0 450 - 4 500 uIU/mL   Cardiovascular Report    Collection Time: 08/10/20  9:31 AM   Result Value Ref Range    Interpretation Note    CBC and differential    Collection Time: 08/10/20  9:32 AM   Result Value Ref Range    White Blood Cell Count 6 7 3 4 - 10 8 x10E3/uL    Red Blood Cell Count 4 52 3 77 - 5 28 x10E6/uL    Hemoglobin 13 5 11 1 - 15 9 g/dL    HCT 41 1 34 0 - 46 6 %    MCV 91 79 - 97 fL    MCH 29 9 26 6 - 33 0 pg    MCHC 32 8 31 5 - 35 7 g/dL    RDW 13 4 11 7 - 15 4 %    Platelet Count 504 391 - 450 x10E3/uL    Neutrophils 59 Not Estab  %    Lymphocytes 29 Not Estab  %    Monocytes 6 Not Estab  %    Eosinophils 6 Not Estab  %    Basophils PCT 0 Not Estab  %    Neutrophils (Absolute) 4 0 1 4 - 7 0 x10E3/uL    Lymphocytes (Absolute) 1 9 0 7 - 3 1 x10E3/uL    Monocytes (Absolute) 0 4 0 1 - 0 9 x10E3/uL    Eosinophils (Absolute) 0 4 0 0 - 0 4 x10E3/uL Basophils ABS 0 0 0 0 - 0 2 x10E3/uL    Immature Granulocytes 0 Not Estab  %    Immature Granulocytes (Absolute) 0 0 0 0 - 0 1 x10E3/uL   Comprehensive metabolic panel    Collection Time: 08/10/20  9:32 AM   Result Value Ref Range    Glucose, Random 93 65 - 99 mg/dL    BUN 18 6 - 24 mg/dL    Creatinine 0 80 0 57 - 1 00 mg/dL    eGFR Non  82 >59 mL/min/1 73    eGFR  95 >59 mL/min/1 73    SL AMB BUN/CREATININE RATIO 23 9 - 23    Sodium 134 134 - 144 mmol/L    Potassium 4 2 3 5 - 5 2 mmol/L    Chloride 95 (L) 96 - 106 mmol/L    CO2 24 20 - 29 mmol/L    CALCIUM 9 8 8 7 - 10 2 mg/dL    Protein, Total 6 7 6 0 - 8 5 g/dL    Albumin 4 5 3 8 - 4 9 g/dL    Globulin, Total 2 2 1 5 - 4 5 g/dL    Albumin/Globulin Ratio 2 0 1 2 - 2 2    TOTAL BILIRUBIN 0 5 0 0 - 1 2 mg/dL    Alk Phos Isoenzymes 71 39 - 117 IU/L    AST 31 0 - 40 IU/L    ALT 42 (H) 0 - 32 IU/L   TIBC    Collection Time: 08/10/20  9:32 AM   Result Value Ref Range    Total Iron Binding Capacity (TIBC) 275 250 - 450 ug/dL    UIBC 215 131 - 425 ug/dL    Iron, Serum 60 27 - 159 ug/dL    Iron Saturation 22 15 - 55 %   Hemoglobin A1c (w/out EAG)    Collection Time: 08/10/20  9:32 AM   Result Value Ref Range    Hemoglobin A1C 5 2 4 8 - 5 6 %   Folate    Collection Time: 08/10/20  9:32 AM   Result Value Ref Range    FOLATE, SERUM 11 3 >3 0 ng/mL   Vitamin A    Collection Time: 08/10/20  9:32 AM   Result Value Ref Range    Vitamin A 35 8 20 1 - 62 0 ug/dL   Vitamin D 25 hydroxy    Collection Time: 08/10/20  9:32 AM   Result Value Ref Range    25-HYDROXY VIT D 53 5 30 0 - 100 0 ng/mL   Vitamin B1, whole blood    Collection Time: 08/10/20  9:32 AM   Result Value Ref Range    Vitamin B1, Whole Blood 172 1 66 5 - 200 0 nmol/L   Vitamin B12    Collection Time: 08/10/20  9:32 AM   Result Value Ref Range    Vitamin B-12 484 232 - 1,245 pg/mL   Zinc    Collection Time: 08/10/20  9:32 AM   Result Value Ref Range    Zinc 81 56 - 134 ug/dL   Ferritin Collection Time: 08/10/20  9:32 AM   Result Value Ref Range    Ferritin 439 (H) 15 - 150 ng/mL   PTH, intact    Collection Time: 08/10/20  9:32 AM   Result Value Ref Range    PTH, Intact 20 15 - 65 pg/mL

## 2020-08-24 ENCOUNTER — TELEMEDICINE (OUTPATIENT)
Dept: BEHAVIORAL/MENTAL HEALTH CLINIC | Facility: CLINIC | Age: 60
End: 2020-08-24
Payer: COMMERCIAL

## 2020-08-24 DIAGNOSIS — F31.75 BIPOLAR 1 DISORDER, DEPRESSED, PARTIAL REMISSION (HCC): Primary | ICD-10-CM

## 2020-08-24 DIAGNOSIS — F43.10 POST TRAUMATIC STRESS DISORDER (PTSD): ICD-10-CM

## 2020-08-24 PROCEDURE — 90834 PSYTX W PT 45 MINUTES: CPT | Performed by: COUNSELOR

## 2020-08-24 NOTE — PSYCH
Virtual Brief Visit    Assessment/Plan:    Problem List Items Addressed This Visit        Other    Bipolar 1 disorder, depressed, partial remission ([facility] [address] ) - Primary    Post traumatic stress disorder (PTSD)                Reason for visit is No chief complaint on file  Encounter provider Selina Chopra [facility]    Provider located at [address] [place] [address]  [address]  [facility]  [phone]    Recent Visits  Date Type Provider Dept   08/21/20 [address], [address] recent visits within past 7 days and meeting all other requirements     Today's Visits  Date Type Provider Dept   08/24/20 Usman Garcia [facility] Therapist [place]   Showing today's visits and meeting all other requirements     Future Appointments  No visits were found meeting these conditions  Showing future appointments within next 150 days and meeting all other requirements        After connecting through telephone, the patient was identified by name and date of birth  Tyrese Holland was informed that this is a telemedicine visit and that the visit is being conducted through telephone  My office door was closed  No one else was in the room  She acknowledged consent and understanding of privacy and security of the platform  The patient has agreed to participate and understands she can discontinue the visit at any time  Patient is aware this is a billable service  Subjective    Tyrese Holland is a 64 y o  female 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  Discussed how nice it has been to not control the interactions between her sone and  and how she is staying out of their business  Also noted she had dreams about her ex  and revealed that she would like a chance to show him up someday  Asked how that would make her life better  Encouraged her to consider writing him a gratitude letter (not to be sent) to move on from her anger and loss  HPI     Past Medical History:   Diagnosis Date    Anxiety     Arthritis     DDD    Bipolar 1 disorder ([address] )     Colon polyp     Costochondritis     last assessed: 4/4/2016    CPAP (continuous positive airway pressure) dependence     Depression     HPV (human papilloma virus) infection 12/2017    found on colonoscopy    Kidney stone     ADRIANA (obstructive sleep apnea)     cpap level 10-15    Postgastrectomy malabsorption     Stress     Stress incontinence     Wears glasses        Past Surgical History:   Procedure Laterality Date    BILATERAL OOPHORECTOMY      COLONOSCOPY      COLONOSCOPY N/A 12/27/2017    Procedure: COLONOSCOPY;  Surgeon: Nella Branham MD;  Location: [place] [address] GI LAB;   Service: Gastroenterology    HYSTERECTOMY      partial    LIPOMA RESECTION      back    LIPOSUCTION      lipoma     DIAGNOSTIC ANOSCOPY & BIOPSY N/A 8/30/2018    Procedure: ANOSCOPY HIGH RESOLUTION;  Surgeon: Oliverio Fletcher MD;  [facility];  Service: Colorectal     DIAGNOSTIC ANOSCOPY & BIOPSY N/A 4/4/2019    Procedure: ANOSCOPY HIGH RESOLUTION;  Surgeon: Oliverio Fletcher MD;  [facility];  Service: Colorectal     DIAGNOSTIC ANOSCOPY & BIOPSY N/A 10/4/2019    Procedure: ANOSCOPY HIGH RESOLUTION;  Surgeon: Oliverio Fletcher MD;  [facility];  Service: Colorectal    LA DESTRUCTION,ANAL LESION(S),EXTENSIVE N/A 4/12/2018    Procedure: EXCISION CONDYLOMA ANAL/RECTAL;  Surgeon: Oliverio Fletcher MD;  [facility];  Service: Colorectal    LA DESTRUCTION,ANAL LESION(S),EXTENSIVE N/A 8/30/2018    Procedure: EXCISION / FULGURATION OF LESIONS;  Surgeon: Oliverio Fletcher MD;  [facility];  Service: Colorectal    LA LAP GASTRIC BYPASS/HEIDI-EN-Y N/A 2/17/2020    Procedure: LAPAROSCOPIC HEIDI-EN-Y GASTRIC BYPASS;  Surgeon: Get Gold MD; [facility];  Service: Bariatrics    WI SURG DIAGNOSTIC EXAM, ANORECTAL N/A 4/12/2018    Procedure: EXAM UNDER ANESTHESIA (EUA); Surgeon: Shelby Mcpherson MD;  [facility];  Service: Colorectal    WI SURG DIAGNOSTIC EXAM, ANORECTAL N/A 8/30/2018    Procedure: EXAM UNDER ANESTHESIA (EUA); Surgeon: Shelby Mcpherson MD;  [facility];  Service: Colorectal    WI SURG DIAGNOSTIC EXAM, ANORECTAL N/A 4/4/2019    Procedure: EXAM UNDER ANESTHESIA (EUA); Surgeon: Shelby Mcpherson MD;  [facility];  Service: Colorectal    WI SURG DIAGNOSTIC EXAM, ANORECTAL N/A 10/4/2019    Procedure: EXAM UNDER ANESTHESIA (EUA); Surgeon: Shelby Mcpherson MD;  [facility];  Service: Colorectal    TONSILLECTOMY      WISDOM TOOTH EXTRACTION      x4       Current Outpatient Medications   Medication Sig Dispense Refill    Calcium 500-100 MG-UNIT CHEW Chew 3  Tabs daily      Coenzyme Q10 (CoQ-10) 100 MG CAPS Take 1 capsule by mouth daily      COLLAGEN PO Take 1 tablet by mouth daily      FLUoxetine (PROzac) 40 MG capsule Take 1 capsule (40 mg total) by mouth daily      Multiple Vitamins-Minerals (BARIATRIC FUSION) CHEW Chew 1 tablet daily       Polyethylene Glycol 3350 (MIRALAX PO) Take by mouth daily      Saw Palmetto 500 MG CAPS Take 1 capsule by mouth daily       No current facility-administered medications for this visit  Allergies   Allergen Reactions    Sulfa Antibiotics Rash       Review of Systems    There were no vitals filed for this visit  I spent 45 minutes directly with the patient during this visit    VIRTUAL VISIT DISCLAIMER    Mary Schneider acknowledges that she has consented to an online visit or consultation   She understands that the online visit is based solely on information provided by her, and that, in the absence of a face-to-face physical evaluation by the physician, the diagnosis she receives is both limited and provisional in terms of accuracy and completeness  This is not intended to replace a full medical face-to-face evaluation by the physician  William Cavazos understands and accepts these terms

## 2020-08-29 ENCOUNTER — HOSPITAL ENCOUNTER (OUTPATIENT)
Dept: RADIOLOGY | Facility: HOSPITAL | Age: 60
Discharge: HOME | End: 2020-08-29
Attending: FAMILY MEDICINE
Payer: COMMERCIAL

## 2020-08-29 DIAGNOSIS — R79.89 ELEVATED LIVER FUNCTION TESTS: ICD-10-CM

## 2020-08-29 PROCEDURE — 76705 ECHO EXAM OF ABDOMEN: CPT

## 2020-08-31 ENCOUNTER — TELEPHONE (OUTPATIENT)
Dept: FAMILY MEDICINE CLINIC | Facility: CLINIC | Age: 60
End: 2020-08-31

## 2020-08-31 DIAGNOSIS — N28.89 RENAL MASS: Primary | ICD-10-CM

## 2020-08-31 NOTE — RESULT ENCOUNTER NOTE
Spoke with pt after I spoke with radiology  Pt has what looks like a mass arising from kidney  Radiology suggested either a renal dedicated CT or a renal dedicated MRI  I spoke with Shreya Hdz and we ordered a renal CT scan  Order is in chart and pt is aware    Do we need to give her any info?   If not ok to close

## 2020-09-02 ENCOUNTER — TELEPHONE (OUTPATIENT)
Dept: FAMILY MEDICINE CLINIC | Facility: CLINIC | Age: 60
End: 2020-09-02

## 2020-09-02 ENCOUNTER — ANNUAL EXAM (OUTPATIENT)
Dept: OBGYN CLINIC | Facility: CLINIC | Age: 60
End: 2020-09-02
Payer: COMMERCIAL

## 2020-09-02 VITALS
TEMPERATURE: 96.2 F | SYSTOLIC BLOOD PRESSURE: 104 MMHG | BODY MASS INDEX: 23.66 KG/M2 | DIASTOLIC BLOOD PRESSURE: 68 MMHG | HEIGHT: 65 IN | WEIGHT: 142 LBS

## 2020-09-02 DIAGNOSIS — Z01.419 WELL WOMAN EXAM WITH ROUTINE GYNECOLOGICAL EXAM: Primary | ICD-10-CM

## 2020-09-02 DIAGNOSIS — Z12.39 SCREENING FOR MALIGNANT NEOPLASM OF BREAST: ICD-10-CM

## 2020-09-02 PROBLEM — E66.01 SEVERE OBESITY (BMI 35.0-39.9) WITH COMORBIDITY (HCC): Status: RESOLVED | Noted: 2019-04-06 | Resolved: 2020-09-02

## 2020-09-02 PROBLEM — M54.50 LOW BACK PAIN: Status: RESOLVED | Noted: 2017-07-20 | Resolved: 2020-09-02

## 2020-09-02 PROCEDURE — 99396 PREV VISIT EST AGE 40-64: CPT | Performed by: OBSTETRICS & GYNECOLOGY

## 2020-09-02 NOTE — PROGRESS NOTES
ASSESSMENT & PLAN:   Diagnoses and all orders for this visit:    Well woman exam with routine gynecological exam    Screening for malignant neoplasm of breast  -     Mammo screening bilateral w cad; Future    The following were reviewed in today's visit: Current ASCCP Guidelines, breast self exam, mammography screening ordered, menopause, exercise and healthy diet  Patient to return to office yearly for annual exam      All questions have been answered to her satisfaction  CC:  Annual Gynecologic Examination    HPI: Serena Cason is a 64 y o  [id number] who presents for annual gynecologic examination  She has the following concerns:  none    Health Maintenance:    She exercises 6 days per week  She wears her seatbelt routinely  She does perform occasional self breast exams  Patient tries to follow a balanced diet  Last mammogram: 12/2019  Last colonoscopy: 12/2017       Past Medical History:   Diagnosis Date    Anxiety     Arthritis     DDD    Bipolar 1 disorder (HCC)     Colon polyp     Costochondritis     last assessed: 4/4/2016    CPAP (continuous positive airway pressure) dependence     Depression     HPV (human papilloma virus) infection 12/2017    found on colonoscopy    Kidney stone     ADRIANA (obstructive sleep apnea)     cpap level 10-15    Postgastrectomy malabsorption     Stress     Stress incontinence     Varicella 1970    Wears glasses        Past Surgical History:   Procedure Laterality Date    BARIATRIC SURGERY  2/17/2020    RNY GASTRIC bypass    BILATERAL OOPHORECTOMY      CERVIX REMOVAL      COLONOSCOPY      COLONOSCOPY N/A 12/27/2017    Procedure: COLONOSCOPY;  Surgeon: Enma Connell MD;  Location: [place] [address] [facility]; Service: Gastroenterology    COLPOSCOPY  10/2019    Abnormal cells in colon 3yr ago   [facility]    HYSTERECTOMY      partial    LIPOMA RESECTION      back    LIPOSUCTION      lipoma    MYOMECTOMY      Unknown, but at least 15 yrs ago   Nemo Grace  DIAGNOSTIC ANOSCOPY & BIOPSY N/A 8/30/2018    Procedure: ANOSCOPY HIGH RESOLUTION;  Surgeon: Sukhdev Bray MD;  [facility];  Service: Colorectal     DIAGNOSTIC ANOSCOPY & BIOPSY N/A 4/4/2019    Procedure: ANOSCOPY HIGH RESOLUTION;  Surgeon: Sukhdev Bray MD;  [facility];  Service: Colorectal     DIAGNOSTIC ANOSCOPY & BIOPSY N/A 10/4/2019    Procedure: ANOSCOPY HIGH RESOLUTION;  Surgeon: Sukhdev Bray MD;  [facility];  Service: Colorectal    RI DESTRUCTION,ANAL LESION(S),EXTENSIVE N/A 4/12/2018    Procedure: EXCISION CONDYLOMA ANAL/RECTAL;  Surgeon: Sukhdev Bray MD;  [facility];  Service: Colorectal    RI DESTRUCTION,ANAL LESION(S),EXTENSIVE N/A 8/30/2018    Procedure: EXCISION / FULGURATION OF LESIONS;  Surgeon: Sukhdev Bray MD;  [facility];  Service: Colorectal    RI LAP GASTRIC BYPASS/HEIDI-EN-Y N/A 2/17/2020    Procedure: LAPAROSCOPIC HEIDI-EN-Y GASTRIC BYPASS;  Surgeon: Primitivo Pearson MD;  Location: [address] [place] [address];  Service: Bariatrics    RI SURG DIAGNOSTIC EXAM, ANORECTAL N/A 4/12/2018    Procedure: EXAM UNDER ANESTHESIA (EUA); Surgeon: Sukhdev Bray MD;  [facility];  Service: Colorectal    RI SURG DIAGNOSTIC EXAM, ANORECTAL N/A 8/30/2018    Procedure: EXAM UNDER ANESTHESIA (EUA); Surgeon: Sukhdev Bray MD;  [facility];  Service: Colorectal    RI SURG DIAGNOSTIC EXAM, ANORECTAL N/A 4/4/2019    Procedure: EXAM UNDER ANESTHESIA (EUA); Surgeon: Sukhdev Bray MD;  [facility];  Service: Colorectal    RI SURG DIAGNOSTIC EXAM, ANORECTAL N/A 10/4/2019    Procedure: EXAM UNDER ANESTHESIA (EUA); Surgeon: Sukhdev Bray MD;  [facility];  Service: Colorectal    TONSILLECTOMY      WISDOM TOOTH EXTRACTION      x4       Past OB/Gyn History:   No LMP recorded  Patient has had a hysterectomy        Patient is post menopausal    History of sexually transmitted infection: No  Patient is currently sexually active      Birth control: postmenopausal  Last Pap 2018 NILM, neg HPV    Family History   Problem Relation Age of Onset    Other Mother         damage to diaphragm s/p surgery    Rheum arthritis Mother     Stroke Mother 61        mini strokes    Diabetes type II Mother 61   Sintia [place] Bipolar disorder Mother     Depression Mother     Anxiety disorder Mother     Arthritis Mother     Hyperlipidemia Mother         Onset/diagnosed in her 66's    [de-identified] / [place] Mother     Osteoarthritis Mother     Hyperlipidemia Father         Onset unknown    Breast cancer Sister 36    Depression Sister     Anxiety disorder Sister     Mental illness Sister         DID diagnosis    Osteoarthritis Sister     Hypertension Paternal Grandmother 79    Vision loss Paternal Grandmother         ARMD + stroke in one eye    Heart disease Paternal Grandmother     Addiction problem Son     Bipolar disorder Son     Depression Son     Anxiety disorder Son     Drug abuse Son     Substance Abuse Son     Alcohol abuse Son     Deep vein thrombosis Son     Osteoarthritis Maternal Grandmother        Social History:  Social History     Socioeconomic History    Marital status: /Civil Union     Spouse name: Not on file    Number of children: Not on file    Years of education: Not on file    Highest education level: Not on file   Occupational History    Not on file   Social Needs    Financial resource strain: Not on file    Food insecurity     Worry: Not on file     Inability: Not on file    Transportation needs     Medical: Not on file     Non-medical: Not on file   Tobacco Use    Smoking status: Former Smoker     Packs/day: 0 25     Years: 10 00     Pack years: 2 50     Types: Cigarettes, Cigars     Start date: 2008     Last attempt to quit: 2018     Years since quittin 1    Smokeless tobacco: Never Used    Tobacco comment: quit 2018   Substance and Sexual Activity    Alcohol use: Not Currently     Alcohol/week: 2 0 standard drinks     Types: 1 Glasses of wine, 1 Cans of beer per week     Frequency: 2-4 times a month     Drinks per session: 1 or 2     Binge frequency: Never     Comment: socially    Drug use: No    Sexual activity: Yes     Partners: Male     Birth control/protection: Post-menopausal   Lifestyle    Physical activity     Days per week: Not on file     Minutes per session: Not on file    Stress: Not on file   Relationships    Social connections     Talks on phone: Not on file     Gets together: Not on file     Attends Judaism service: Not on file     Active member of club or organization: Not on file     Attends meetings of clubs or organizations: Not on file     Relationship status: Not on file    Intimate partner violence     Fear of current or ex partner: Not on file     Emotionally abused: Not on file     Physically abused: Not on file     Forced sexual activity: Not on file   Other Topics Concern    Not on file   Social History Narrative    Not on file     Presently lives with   She feels safe at home  Patient is currently employed  Allergies   Allergen Reactions    Sulfa Antibiotics Rash         Current Outpatient Medications:     Calcium 500-100 MG-UNIT CHEW, Chew 3  Tabs daily, Disp: , Rfl:     Coenzyme Q10 (CoQ-10) 100 MG CAPS, Take 1 capsule by mouth daily, Disp: , Rfl:     COLLAGEN PO, Take 1 tablet by mouth daily, Disp: , Rfl:     FLUoxetine (PROzac) 40 MG capsule, Take 1 capsule (40 mg total) by mouth daily, Disp: , Rfl:     Multiple Vitamins-Minerals (BARIATRIC FUSION) CHEW, Chew 1 tablet daily , Disp: , Rfl:     Polyethylene Glycol 3350 (MIRALAX PO), Take by mouth daily, Disp: , Rfl:     Saw Palmetto 500 MG CAPS, Take 1 capsule by mouth daily, Disp: , Rfl:     Review of Systems:  Review of Systems   Constitutional: Negative for chills, fever and unexpected weight change     Respiratory: Negative for cough and shortness of breath  Cardiovascular: Negative for chest pain and palpitations  Gastrointestinal: Negative for abdominal distention, abdominal pain, blood in stool, constipation, nausea and vomiting  Gastric bypass in February     Genitourinary: Negative for difficulty urinating, dyspareunia, dysuria, flank pain, genital sores, hematuria, pelvic pain, urgency, vaginal bleeding, vaginal discharge and vaginal pain  Neurological: Negative for headaches  Physical Exam:  /68   Temp (!) 96 2 °F (35 7 °C)   Ht 5' 5" (1 651 m)   Wt 64 4 kg (142 lb)   BMI 23 63 kg/m²    Physical Exam  Constitutional:       General: She is awake  Appearance: Normal appearance  She is well-developed  Genitourinary:      Pelvic exam was performed with patient in the lithotomy position  Vulva, urethra, bladder and vagina normal       No vulval lesion, ulcerations or rash noted  No urethral prolapse present  Bladder is not distended or tender  Vaginal atrophy present  No vaginal discharge, erythema, tenderness, bleeding or prolapse  Cervix is absent  Uterus is absent  No right or left adnexal mass present  Right adnexa not tender or full  Left adnexa not tender or full  HENT:      Head: Normocephalic and atraumatic  Neck:      Musculoskeletal: Neck supple  Cardiovascular:      Rate and Rhythm: Normal rate and regular rhythm  Heart sounds: Normal heart sounds  Pulmonary:      Effort: Pulmonary effort is normal  No tachypnea or respiratory distress  Breath sounds: Normal breath sounds  Chest:      Breasts:         Right: Normal  No inverted nipple, mass, nipple discharge, skin change or tenderness  Left: Normal  No inverted nipple, mass, nipple discharge, skin change or tenderness  Abdominal:      General: There is no distension  Palpations: Abdomen is soft  Tenderness: There is no abdominal tenderness   There is no guarding  Lymphadenopathy:      Upper Body:      Right upper body: No supraclavicular or axillary adenopathy  Left upper body: No supraclavicular or axillary adenopathy  Neurological:      General: No focal deficit present  Mental Status: She is alert and oriented to person, place, and time  Psychiatric:         Mood and Affect: Mood normal          Behavior: Behavior normal          Thought Content: Thought content normal          Judgment: Judgment normal    Vitals signs reviewed

## 2020-09-02 NOTE — TELEPHONE ENCOUNTER
Patient and Stacie Mcmillan advocate just called regarding pt CT tomorrow morning  Still not approved  Wants Dr Mcnally Resides to advocate for her to expedite with [facility]# [phone]

## 2020-09-02 NOTE — TELEPHONE ENCOUNTER
Patient came in for an appt today  Patient was asked the screening questions and she stated that she was in [place] and she got back 8/28  This is inside the 2 week quarantine period  I spoke with Dr Uli Aaron and he stated it is up to the nurse if she could be seen  I asked the front nurse station and then Avi Acevedo and they all felt uncomfortable with rooming the pt since [place] a high priority state  I spoke with Aristeo Ng about this and she was very upset and very rude  I told her that this is our office policy and one for [facility] but she stated she did not care  I offered her a virtual visit so so she could be seen for any medication and still have a discussion with the doctor but the patient refuse  I also offered to reschedule out of the 2 week quarantine period but she said she will call back and have to speak to someone shon than me         This is an FYI

## 2020-09-02 NOTE — TELEPHONE ENCOUNTER
I called and spoke with [facility]  They stated the test was not approved or denied  At this point I cannot speak with a physician for a prior auth as the study was not denied  It is still being reviewed and they are not requesting any further info    Please call pt to give her that info

## 2020-09-02 NOTE — TELEPHONE ENCOUNTER
----- Message from Marissa Velazquez, [facility] sent at 9/2/2020  7:46 PM EDT -----  Regarding: FW: Pre-Procedure Testing Question  Contact: [phone]    ----- Message -----  From: Divine Barnes  Sent: 9/2/2020   4:52 PM EDT  To: [facility]  Subject: Pre-Procedure Testing Question                   Hi, Can you please do me a huge favor   ? The preauthorization for my renal CT in the am is in final review and they say if you call and advocate for me that they'll get the decision done today so I can have the CT at 9:30 AM   The number for Selena Core is [phone]  Just trying to get this lined up, since I will be in [facility] for most of the next year    Thank you!

## 2020-09-02 NOTE — TELEPHONE ENCOUNTER
Spoke with pt, she would like to be seen for a physical, we will be placing her in the sched at noon tomorrow

## 2020-09-03 ENCOUNTER — HOSPITAL ENCOUNTER (OUTPATIENT)
Dept: RADIOLOGY | Facility: HOSPITAL | Age: 60
Discharge: HOME | End: 2020-09-03
Attending: FAMILY MEDICINE
Payer: COMMERCIAL

## 2020-09-03 ENCOUNTER — OFFICE VISIT (OUTPATIENT)
Dept: FAMILY MEDICINE CLINIC | Facility: CLINIC | Age: 60
End: 2020-09-03
Payer: COMMERCIAL

## 2020-09-03 VITALS
SYSTOLIC BLOOD PRESSURE: 100 MMHG | TEMPERATURE: 98.5 F | RESPIRATION RATE: 16 BRPM | OXYGEN SATURATION: 95 % | WEIGHT: 143 LBS | HEART RATE: 63 BPM | HEIGHT: 65 IN | BODY MASS INDEX: 23.82 KG/M2 | DIASTOLIC BLOOD PRESSURE: 60 MMHG

## 2020-09-03 DIAGNOSIS — N28.89 RENAL MASS: ICD-10-CM

## 2020-09-03 DIAGNOSIS — E78.2 MIXED HYPERLIPIDEMIA: ICD-10-CM

## 2020-09-03 DIAGNOSIS — K91.2 POSTSURGICAL MALABSORPTION: ICD-10-CM

## 2020-09-03 DIAGNOSIS — D17.71 RENAL ANGIOMYOLIPOMA: Primary | ICD-10-CM

## 2020-09-03 DIAGNOSIS — Z00.00 WELL ADULT EXAM: Primary | ICD-10-CM

## 2020-09-03 DIAGNOSIS — Z23 NEED FOR VACCINATION: ICD-10-CM

## 2020-09-03 DIAGNOSIS — L72.3 SEBACEOUS CYST: ICD-10-CM

## 2020-09-03 DIAGNOSIS — D17.71 RENAL ANGIOMYOLIPOMA: ICD-10-CM

## 2020-09-03 PROBLEM — Z98.84 S/P BARIATRIC SURGERY: Status: ACTIVE | Noted: 2020-09-03

## 2020-09-03 PROBLEM — Z48.815 ENCOUNTER FOR SURGICAL AFTERCARE FOLLOWING SURGERY OF DIGESTIVE SYSTEM: Status: RESOLVED | Noted: 2020-02-20 | Resolved: 2020-09-03

## 2020-09-03 PROBLEM — T81.31XA SURGICAL WOUND DEHISCENCE: Status: RESOLVED | Noted: 2020-03-16 | Resolved: 2020-09-03

## 2020-09-03 PROCEDURE — 90682 RIV4 VACC RECOMBINANT DNA IM: CPT

## 2020-09-03 PROCEDURE — 3725F SCREEN DEPRESSION PERFORMED: CPT | Performed by: FAMILY MEDICINE

## 2020-09-03 PROCEDURE — 99396 PREV VISIT EST AGE 40-64: CPT | Performed by: FAMILY MEDICINE

## 2020-09-03 PROCEDURE — 90471 IMMUNIZATION ADMIN: CPT

## 2020-09-03 PROCEDURE — 74178 CT ABD&PLV WO CNTR FLWD CNTR: CPT

## 2020-09-03 PROCEDURE — G1004 CDSM NDSC: HCPCS

## 2020-09-03 RX ADMIN — IOHEXOL 100 ML: 350 INJECTION, SOLUTION INTRAVENOUS at 09:58

## 2020-09-03 NOTE — PROGRESS NOTES
FAMILY PRACTICE HEALTH MAINTENANCE OFFICE VISIT  [facility]    NAME: Annetta Borrego  AGE: 64 y o  SEX: female  : 1960     DATE: 9/3/2020    Assessment and Plan     1  Well adult exam    2  Mixed hyperlipidemia    3  Postsurgical malabsorption    4  Need for vaccination  -     influenza vaccine, quadrivalent, recombinant, PF, 0 5 mL, for patients 18 yr+ (FLUBLOK)    5  Sebaceous cyst  -     Ambulatory referral to General Surgery; Future    6  Renal angiomyolipoma  Assessment & Plan:  Spok with pt about the results on the CT scan  Pt was sent to urology  Advised on possibilities  PT will call for an appt  · Patient Counseling:   · Nutrition: Stressed importance of a well balanced diet, moderation of sodium/saturated fat, caloric balance and sufficient intake of fiber  · Exercise: Stressed the importance of regular exercise with a goal of 150 minutes per week  · Dental Health: Discussed daily flossing and brushing and regular dental visits     · Immunizations reviewed: See Orders  · Discussed benefits of:  Colon Cancer Screening, Mammogram , Cervical Cancer screening and Screening labs   BMI Counseling: Body mass index is 23 8 kg/m²  Discussed with patient's BMI with her  The BMI is acceptable    No follow-ups on file          Chief Complaint     Chief Complaint   Patient presents with    Physical Exam     wmcma       History of Present Illness     Pt is here for a full physical      Well Adult Physical   Patient here for a comprehensive physical exam       Diet and Physical Activity  Diet: low calorie diet  Exercise: frequently      Depression Screen  PHQ-9 Depression Screening    PHQ-9:    Frequency of the following problems over the past two weeks:       Little interest or pleasure in doing things:  0 - not at all  Feeling down, depressed, or hopeless:  0 - not at all  PHQ-2 Score:  0          General Health  Hearing: Normal:  bilateral  Vision: wears glasses  Dental: regular dental visits    Reproductive Health  No issues       The following portions of the patient's history were reviewed and updated as appropriate: allergies, current medications, past family history, past medical history, past social history, past surgical history and problem list     Review of Systems     Review of Systems   Constitutional: Negative  Negative for activity change, appetite change, chills, diaphoresis and fatigue  HENT: Negative  Negative for dental problem, ear pain, sinus pressure and sore throat  Eyes: Negative  Negative for photophobia, pain, discharge, redness, itching and visual disturbance  Respiratory: Negative for apnea and chest tightness  Cardiovascular: Negative  Negative for chest pain, palpitations and leg swelling  Gastrointestinal: Negative  Negative for abdominal distention, abdominal pain, constipation and diarrhea  Endocrine: Negative  Negative for cold intolerance and heat intolerance  Genitourinary: Negative  Negative for difficulty urinating and dyspareunia  Musculoskeletal: Negative  Negative for arthralgias and back pain  Skin:        Lesion on back   Allergic/Immunologic: Negative for environmental allergies  Neurological: Negative  Negative for dizziness  Psychiatric/Behavioral: Negative  Negative for agitation         Past Medical History     Past Medical History:   Diagnosis Date    Anxiety     Arthritis     DDD    [facility]     Colon polyp     Costochondritis     last assessed: 4/4/2016    CPAP (continuous positive airway pressure) dependence     Depression     HPV (human papilloma virus) infection 12/2017    found on colonoscopy    Kidney stone     ADRIANA (obstructive sleep apnea)     cpap level 10-15    Postgastrectomy malabsorption     Stress     Stress incontinence     Varicella 1970    Wears glasses        Past Surgical History     Past Surgical History:   Procedure Laterality Date    BARIATRIC SURGERY  2/17/2020    RNY GASTRIC bypass    BILATERAL OOPHORECTOMY      CERVIX REMOVAL      COLONOSCOPY      COLONOSCOPY N/A 12/27/2017    Procedure: COLONOSCOPY;  Surgeon: Dyana Fountain MD;  [facility]; Service: Gastroenterology    COLPOSCOPY  10/2019    Abnormal cells in colon 3yr ago  [facility]    HYSTERECTOMY      partial    LIPOMA RESECTION      back    LIPOSUCTION      lipoma    MYOMECTOMY      Unknown, but at least 15 yrs ago   [facility] DIAGNOSTIC ANOSCOPY & BIOPSY N/A 8/30/2018    Procedure: ANOSCOPY HIGH RESOLUTION;  Surgeon: Cornell Fry MD;  [facility];  Service: Colorectal     DIAGNOSTIC ANOSCOPY & BIOPSY N/A 4/4/2019    Procedure: ANOSCOPY HIGH RESOLUTION;  Surgeon: Cornell Fry MD;  [facility];  Service: Colorectal     DIAGNOSTIC ANOSCOPY & BIOPSY N/A 10/4/2019    Procedure: ANOSCOPY HIGH RESOLUTION;  Surgeon: Cornell Fry MD;  [facility];  Service: Colorectal    TN DESTRUCTION,ANAL LESION(S),EXTENSIVE N/A 4/12/2018    Procedure: EXCISION CONDYLOMA ANAL/RECTAL;  Surgeon: Cornell Fry MD;  [facility];  Service: Colorectal    TN DESTRUCTION,ANAL LESION(S),EXTENSIVE N/A 8/30/2018    Procedure: EXCISION / FULGURATION OF LESIONS;  Surgeon: Cornell Fry MD;  [facility];  Service: Colorectal    TN LAP GASTRIC BYPASS/HEIDI-EN-Y N/A 2/17/2020    Procedure: LAPAROSCOPIC HEIDI-EN-Y GASTRIC BYPASS;  Surgeon: Ching Tirado MD;  Location: [address] [place] [address];  Service: Bariatrics    TN SURG DIAGNOSTIC EXAM, ANORECTAL N/A 4/12/2018    Procedure: EXAM UNDER ANESTHESIA (EUA); Surgeon: Cornell Fry MD;  [facility];  Service: Colorectal    TN SURG DIAGNOSTIC EXAM, ANORECTAL N/A 8/30/2018    Procedure: EXAM UNDER ANESTHESIA (EUA);   Surgeon: Cornell Fry MD;  [facility];  Service: Colorectal    TN SURG DIAGNOSTIC EXAM, ANORECTAL N/A 4/4/2019 Procedure: EXAM UNDER ANESTHESIA (EUA); Surgeon: Ahn Flowers MD;  [facility]: [facility];  Service: Colorectal    DC SURG DIAGNOSTIC EXAM, ANORECTAL N/A 10/4/2019    Procedure: EXAM UNDER ANESTHESIA (EUA);   Surgeon: Anh Flowers MD;  Location: [facility];  Service: Colorectal    TONSILLECTOMY      WISDOM TOOTH EXTRACTION      x4       Social History     Social History     Socioeconomic History    Marital status: /Civil Union     Spouse name: None    Number of children: None    Years of education: None    Highest education level: None   Occupational History    None   Social Needs    Financial resource strain: None    Food insecurity     Worry: None     Inability: None    Transportation needs     Medical: None     Non-medical: None   Tobacco Use    Smoking status: Former Smoker     Packs/day: 0 25     Years: 10 00     Pack years: 2 50     Types: Cigarettes, Cigars     Start date: 2008     Last attempt to quit: 2018     Years since quittin 1    Smokeless tobacco: Never Used    Tobacco comment: quit 2018   Substance and Sexual Activity    Alcohol use: Not Currently     Alcohol/week: 2 0 standard drinks     Types: 1 Glasses of wine, 1 Cans of beer per week     Frequency: 2-4 times a month     Drinks per session: 1 or 2     Binge frequency: Never     Comment: socially    Drug use: No    Sexual activity: Yes     Partners: Male     Birth control/protection: Post-menopausal   Lifestyle    Physical activity     Days per week: None     Minutes per session: None    Stress: None   Relationships    Social connections     Talks on phone: None     Gets together: None     Attends Taoist service: None     Active member of club or organization: None     Attends meetings of clubs or organizations: None     Relationship status: None    Intimate partner violence     Fear of current or ex partner: None     Emotionally abused: None     Physically abused: None     Forced sexual activity: None   Other Topics Concern    None   Social History Narrative    None       Family History     Family History   Problem Relation Age of Onset    Other Mother         damage to diaphragm s/p surgery    Rheum arthritis Mother     Stroke Mother 61        mini strokes    Diabetes type II Mother 61   [facility] Bipolar disorder Mother     Depression Mother     Anxiety disorder Mother     Arthritis Mother     Hyperlipidemia Mother         Onset/diagnosed in her 66's    [de-identified] / [place] Mother     Osteoarthritis Mother     Hyperlipidemia Father         Onset unknown    Breast cancer Sister 36    Depression Sister     Anxiety disorder Sister     Mental illness Sister         DID diagnosis    Osteoarthritis Sister     Hypertension Paternal Grandmother 79    Vision loss Paternal Grandmother         ARMD + stroke in one eye    Heart disease Paternal Grandmother     Addiction problem Son     Bipolar disorder Son     Depression Son     Anxiety disorder Son     Drug abuse Son     Substance Abuse Son     Alcohol abuse Son     Deep vein thrombosis Son     Osteoarthritis Maternal Grandmother        Current Medications       Current Outpatient Medications:     Calcium 500-100 MG-UNIT CHEW, Chew 3  Tabs daily, Disp: , Rfl:     Coenzyme Q10 (CoQ-10) 100 MG CAPS, Take 1 capsule by mouth daily, Disp: , Rfl:     COLLAGEN PO, Take 1 tablet by mouth daily, Disp: , Rfl:     FLUoxetine (PROzac) 40 MG capsule, Take 1 capsule (40 mg total) by mouth daily, Disp: , Rfl:     Multiple Vitamins-Minerals (BARIATRIC FUSION) CHEW, Chew 1 tablet daily , Disp: , Rfl:     Polyethylene Glycol 3350 (MIRALAX PO), Take by mouth daily, Disp: , Rfl:     Saw Palmetto 500 MG CAPS, Take 1 capsule by mouth daily, Disp: , Rfl:   No current facility-administered medications for this visit        Allergies     Allergies   Allergen Reactions    Sulfa Antibiotics Rash       Objective     /60   Pulse 63   Temp 98 5 °F (36 9 °C)   Resp 16   Ht 5' 5" (1 651 m)   Wt 64 9 kg (143 lb)   SpO2 95%   BMI 23 80 kg/m²      Physical Exam  Vitals signs and nursing note reviewed  Constitutional:       General: She is not in acute distress  Appearance: She is well-developed  She is not diaphoretic  HENT:      Head: Normocephalic and atraumatic  Right Ear: External ear normal       Left Ear: External ear normal       Nose: Nose normal       Mouth/Throat:      Pharynx: No oropharyngeal exudate  Eyes:      General: No scleral icterus  Right eye: No discharge  Left eye: No discharge  Pupils: Pupils are equal, round, and reactive to light  Neck:      Thyroid: No thyromegaly  Cardiovascular:      Rate and Rhythm: Normal rate  Heart sounds: Normal heart sounds  No murmur  Pulmonary:      Effort: Pulmonary effort is normal  No respiratory distress  Breath sounds: Normal breath sounds  No wheezing  Abdominal:      General: Bowel sounds are normal  There is no distension  Palpations: Abdomen is soft  There is no mass  Tenderness: There is no abdominal tenderness  There is no guarding or rebound  Musculoskeletal: Normal range of motion  Skin:     General: Skin is warm and dry  Findings: No erythema or rash  Comments: Small lesion on back with what looks like connection to skin   Neurological:      Mental Status: She is alert        Coordination: Coordination normal       Deep Tendon Reflexes: Reflexes normal    Psychiatric:         Behavior: Behavior normal             Visual Acuity Screening    Right eye Left eye Both eyes   Without correction:      With correction: 20/25 20/30 20/25   Comments: Patient went to eye doctor      Recent Results (from the past 2016 hour(s))   CBC and differential    Collection Time: 08/10/20  9:31 AM   Result Value Ref Range    White Blood Cell Count 7 0 3 4 - 10 8 x10E3/uL    Red Blood Cell Count 4 55 3 77 - 5 28 x10E6/uL    Hemoglobin 13 4 11 1 - 15 9 g/dL    HCT 41 3 34 0 - 46 6 %    MCV 91 79 - 97 fL    MCH 29 5 26 6 - 33 0 pg    MCHC 32 4 31 5 - 35 7 g/dL    RDW 13 8 11 7 - 15 4 %    Platelet Count 359 185 - 450 x10E3/uL    Neutrophils 61 Not Estab  %    Lymphocytes 27 Not Estab  %    Monocytes 6 Not Estab  %    Eosinophils 6 Not Estab  %    Basophils PCT 0 Not Estab  %    Neutrophils (Absolute) 4 2 1 4 - 7 0 x10E3/uL    Lymphocytes (Absolute) 1 9 0 7 - 3 1 x10E3/uL    Monocytes (Absolute) 0 4 0 1 - 0 9 x10E3/uL    Eosinophils (Absolute) 0 4 0 0 - 0 4 x10E3/uL    Basophils ABS 0 0 0 0 - 0 2 x10E3/uL    Immature Granulocytes 0 Not Estab  %    Immature Granulocytes (Absolute) 0 0 0 0 - 0 1 x10E3/uL   Comprehensive metabolic panel    Collection Time: 08/10/20  9:31 AM   Result Value Ref Range    Glucose, Random 89 65 - 99 mg/dL    BUN 20 6 - 24 mg/dL    Creatinine 0 73 0 57 - 1 00 mg/dL    eGFR Non  92 >59 mL/min/1 73    eGFR  106 >59 mL/min/1 73    SL AMB BUN/CREATININE RATIO 27 (H) 9 - 23    Sodium 137 134 - 144 mmol/L    Potassium 4 2 3 5 - 5 2 mmol/L    Chloride 98 96 - 106 mmol/L    CO2 25 20 - 29 mmol/L    CALCIUM 9 8 8 7 - 10 2 mg/dL    Protein, Total 6 7 6 0 - 8 5 g/dL    Albumin 4 4 3 8 - 4 9 g/dL    Globulin, Total 2 3 1 5 - 4 5 g/dL    Albumin/Globulin Ratio 1 9 1 2 - 2 2    TOTAL BILIRUBIN 0 6 0 0 - 1 2 mg/dL    Alk Phos Isoenzymes 71 39 - 117 IU/L    AST 31 0 - 40 IU/L    ALT 44 (H) 0 - 32 IU/L   Lipid panel    Collection Time: 08/10/20  9:31 AM   Result Value Ref Range    Cholesterol, Total 123 100 - 199 mg/dL    Triglycerides 56 0 - 149 mg/dL    HDL 61 >39 mg/dL    LDL Calculated 51 0 - 99 mg/dL   TSH, 3rd generation    Collection Time: 08/10/20  9:31 AM   Result Value Ref Range    TSH 2 050 0 450 - 4 500 uIU/mL   Cardiovascular Report    Collection Time: 08/10/20  9:31 AM   Result Value Ref Range    Interpretation Note    CBC and differential    Collection Time: 08/10/20  9:32 AM   Result Value Ref Range White Blood Cell Count 6 7 3 4 - 10 8 x10E3/uL    Red Blood Cell Count 4 52 3 77 - 5 28 x10E6/uL    Hemoglobin 13 5 11 1 - 15 9 g/dL    HCT 41 1 34 0 - 46 6 %    MCV 91 79 - 97 fL    MCH 29 9 26 6 - 33 0 pg    MCHC 32 8 31 5 - 35 7 g/dL    RDW 13 4 11 7 - 15 4 %    Platelet Count 079 708 - 450 x10E3/uL    Neutrophils 59 Not Estab  %    Lymphocytes 29 Not Estab  %    Monocytes 6 Not Estab  %    Eosinophils 6 Not Estab  %    Basophils PCT 0 Not Estab  %    Neutrophils (Absolute) 4 0 1 4 - 7 0 x10E3/uL    Lymphocytes (Absolute) 1 9 0 7 - 3 1 x10E3/uL    Monocytes (Absolute) 0 4 0 1 - 0 9 x10E3/uL    Eosinophils (Absolute) 0 4 0 0 - 0 4 x10E3/uL    Basophils ABS 0 0 0 0 - 0 2 x10E3/uL    Immature Granulocytes 0 Not Estab  %    Immature Granulocytes (Absolute) 0 0 0 0 - 0 1 x10E3/uL   Comprehensive metabolic panel    Collection Time: 08/10/20  9:32 AM   Result Value Ref Range    Glucose, Random 93 65 - 99 mg/dL    BUN 18 6 - 24 mg/dL    Creatinine 0 80 0 57 - 1 00 mg/dL    eGFR Non  82 >59 mL/min/1 73    eGFR  95 >59 mL/min/1 73    SL AMB BUN/CREATININE RATIO 23 9 - 23    Sodium 134 134 - 144 mmol/L    Potassium 4 2 3 5 - 5 2 mmol/L    Chloride 95 (L) 96 - 106 mmol/L    CO2 24 20 - 29 mmol/L    CALCIUM 9 8 8 7 - 10 2 mg/dL    Protein, Total 6 7 6 0 - 8 5 g/dL    Albumin 4 5 3 8 - 4 9 g/dL    Globulin, Total 2 2 1 5 - 4 5 g/dL    Albumin/Globulin Ratio 2 0 1 2 - 2 2    TOTAL BILIRUBIN 0 5 0 0 - 1 2 mg/dL    Alk Phos Isoenzymes 71 39 - 117 IU/L    AST 31 0 - 40 IU/L    ALT 42 (H) 0 - 32 IU/L   TIBC    Collection Time: 08/10/20  9:32 AM   Result Value Ref Range    Total Iron Binding Capacity (TIBC) 275 250 - 450 ug/dL    UIBC 215 131 - 425 ug/dL    Iron, Serum 60 27 - 159 ug/dL    Iron Saturation 22 15 - 55 %   Hemoglobin A1c (w/out EAG)    Collection Time: 08/10/20  9:32 AM   Result Value Ref Range    Hemoglobin A1C 5 2 4 8 - 5 6 %   Folate    Collection Time: 08/10/20  9:32 AM   Result Value Ref Range    FOLATE, SERUM 11 3 >3 0 ng/mL   Vitamin A    Collection Time: 08/10/20  9:32 AM   Result Value Ref Range    Vitamin A 35 8 20 1 - 62 0 ug/dL   Vitamin D 25 hydroxy    Collection Time: 08/10/20  9:32 AM   Result Value Ref Range    25-HYDROXY VIT D 53 5 30 0 - 100 0 ng/mL   Vitamin B1, whole blood    Collection Time: 08/10/20  9:32 AM   Result Value Ref Range    Vitamin B1, Whole Blood 172 1 66 5 - 200 0 nmol/L   Vitamin B12    Collection Time: 08/10/20  9:32 AM   Result Value Ref Range    Vitamin B-12 484 232 - 1,245 pg/mL   Zinc    Collection Time: 08/10/20  9:32 AM   Result Value Ref Range    Zinc 81 56 - 134 ug/dL   Ferritin    Collection Time: 08/10/20  9:32 AM   Result Value Ref Range    Ferritin 439 (H) 15 - 150 ng/mL   PTH, intact    Collection Time: 08/10/20  9:32 AM   Result Value Ref Range    PTH, Intact 20 15 - 65 pg/mL         Ledy Cardoso, [address]

## 2020-09-03 NOTE — ASSESSMENT & PLAN NOTE
Spok with pt about the results on the CT scan  Pt was sent to urology  Advised on possibilities  PT will call for an appt

## 2020-09-21 ENCOUNTER — ANESTHESIA EVENT (OUTPATIENT)
Dept: PERIOP | Facility: AMBULARY SURGERY CENTER | Age: 60
End: 2020-09-21
Payer: COMMERCIAL

## 2020-09-21 ENCOUNTER — TELEMEDICINE (OUTPATIENT)
Dept: BEHAVIORAL/MENTAL HEALTH CLINIC | Facility: CLINIC | Age: 60
End: 2020-09-21
Payer: COMMERCIAL

## 2020-09-21 DIAGNOSIS — F43.10 POST TRAUMATIC STRESS DISORDER (PTSD): ICD-10-CM

## 2020-09-21 DIAGNOSIS — F31.75 BIPOLAR 1 DISORDER, DEPRESSED, PARTIAL REMISSION (HCC): Primary | ICD-10-CM

## 2020-09-21 PROCEDURE — 90834 PSYTX W PT 45 MINUTES: CPT | Performed by: COUNSELOR

## 2020-09-21 NOTE — PSYCH
Virtual Brief Visit    Assessment/Plan:    Problem List Items Addressed This Visit        Other    Bipolar 1 disorder, depressed, partial remission ([facility] ) - Primary    Post traumatic stress disorder (PTSD)                Reason for visit is No chief complaint on file  Encounter provider [facility]    Provider located at [address]  [address]  [place] [address]  [phone]    Recent Visits  No visits were found meeting these conditions  Showing recent visits within past 7 days and meeting all other requirements     Today's Visits  Date Type Provider Dept   09/21/20 [facility] Therapist [place]   Showing today's visits and meeting all other requirements     Future Appointments  No visits were found meeting these conditions  Showing future appointments within next 150 days and meeting all other requirements        After connecting through telephone, the patient was identified by name and date of birth  Zara Cabrera was informed that this is a telemedicine visit and that the visit is being conducted through telephone  My office door was closed  No one else was in the room  She acknowledged consent and understanding of privacy and security of the platform  The patient has agreed to participate and understands she can discontinue the visit at any time  Patient is aware this is a billable service  Vi Figueroa is a 64 y o  female Stated that she could not get the Teams application to work  Informed her that we must use Teams or be live  Suggested that she call  to help her set up the system  Stated that she was having bad dreams and could not understands why  Continues to find meaning in things that are unconnected   Noted that she is fearful that  is minimizing issues between him and her son because they are not complaining to her  Suggested that she refocus on her own healing and creating the life she wants while she is in [place]  Also noted that she does not do anything without her  being there  Asked her to consider why she is waiting rather than creating her own relationships  Laemilysushma Tabor HPI     Past Medical History:   Diagnosis Date    Anxiety     Arthritis     DDD    Bipolar 1 disorder ([address] )     Colon polyp     Costochondritis     last assessed: 4/4/2016    CPAP (continuous positive airway pressure) dependence     Depression     HPV (human papilloma virus) infection 12/2017    found on colonoscopy    Kidney stone     ADRIANA (obstructive sleep apnea)     cpap level 10-15    Postgastrectomy malabsorption     Stress     Stress incontinence     Varicella 1970    Wears glasses        Past Surgical History:   Procedure Laterality Date    BARIATRIC SURGERY  2/17/2020    RNY GASTRIC bypass    BILATERAL OOPHORECTOMY      CERVIX REMOVAL      COLONOSCOPY      COLONOSCOPY N/A 12/27/2017    Procedure: COLONOSCOPY;  Surgeon: Krish Abdul MD;  Location: [facility]; Service: Gastroenterology    COLPOSCOPY  10/2019    Abnormal cells in colon 3yr ago   [facility]    HYSTERECTOMY      partial    LIPOMA RESECTION      back    LIPOSUCTION      lipoma    MYOMECTOMY      Unknown, but at least 15 yrs ago   Melba Khan  DIAGNOSTIC ANOSCOPY & BIOPSY N/A 8/30/2018    Procedure: ANOSCOPY HIGH RESOLUTION;  Surgeon: Alfredo Dotson MD;  [facility];  Service: Colorectal     DIAGNOSTIC ANOSCOPY & BIOPSY N/A 4/4/2019    Procedure: ANOSCOPY HIGH RESOLUTION;  Surgeon: Alfredo Dotson MD;  [facility];  Service: Colorectal     DIAGNOSTIC ANOSCOPY & BIOPSY N/A 10/4/2019    Procedure: ANOSCOPY HIGH RESOLUTION;  Surgeon: Alfredo Dotson MD;  [facility];  Service: Colorectal    OK DESTRUCTION,ANAL LESION(S),EXTENSIVE N/A 4/12/2018    Procedure: EXCISION CONDYLOMA ANAL/RECTAL;  Surgeon: Annabel Cameron MD;  [facility];  Service: Colorectal    MN DESTRUCTION,ANAL LESION(S),EXTENSIVE N/A 8/30/2018    Procedure: EXCISION / FULGURATION OF LESIONS;  Surgeon: Annabel Cameron MD;  [facility];  Service: Colorectal    MN LAP GASTRIC BYPASS/HEIDI-EN-Y N/A 2/17/2020    Procedure: LAPAROSCOPIC HEIDI-EN-Y GASTRIC BYPASS;  Surgeon: Mili Grajeda MD;  [facility];  Service: [facility], ANORECTAL N/A 4/12/2018    Procedure: EXAM UNDER ANESTHESIA (EUA); Surgeon: Annabel Cameron MD;  [facility]: [facility];  Service: Colorectal    MN SURG DIAGNOSTIC EXAM, ANORECTAL N/A 8/30/2018    Procedure: EXAM UNDER ANESTHESIA (EUA); Surgeon: Annabel Cameron MD;  [facility];  Service: Colorectal    MN SURG DIAGNOSTIC EXAM, ANORECTAL N/A 4/4/2019    Procedure: EXAM UNDER ANESTHESIA (EUA); Surgeon: Annabel Cameron MD;  [facility];  Service: Colorectal    MN SURG DIAGNOSTIC EXAM, ANORECTAL N/A 10/4/2019    Procedure: EXAM UNDER ANESTHESIA (EUA); Surgeon: Annabel Cameron MD;  [facility];  Service: Colorectal    TONSILLECTOMY      WISDOM TOOTH EXTRACTION      x4       Current Outpatient Medications   Medication Sig Dispense Refill    Calcium 500-100 MG-UNIT CHEW Chew 3  Tabs daily      Coenzyme Q10 (CoQ-10) 100 MG CAPS Take 1 capsule by mouth daily      COLLAGEN PO Take 1 tablet by mouth daily      FLUoxetine (PROzac) 40 MG capsule Take 1 capsule (40 mg total) by mouth daily      Multiple Vitamins-Minerals (BARIATRIC FUSION) CHEW Chew 1 tablet daily       Polyethylene Glycol 3350 (MIRALAX PO) Take by mouth daily      Saw Palmetto 500 MG CAPS Take 1 capsule by mouth daily       No current facility-administered medications for this visit           Allergies   Allergen Reactions    Sulfa Antibiotics Rash       Review of Systems    There were no vitals filed for this visit       I spent 45 minutes directly with the patient during this visit    VIRTUAL VISIT DISCLAIMER    Morrison Dorian acknowledges that she has consented to an online visit or consultation  She understands that the online visit is based solely on information provided by her, and that, in the absence of a face-to-face physical evaluation by the physician, the diagnosis she receives is both limited and provisional in terms of accuracy and completeness  This is not intended to replace a full medical face-to-face evaluation by the physician  Enoc Alarcon understands and accepts these terms

## 2020-09-30 ENCOUNTER — TRANSCRIBE ORDERS (OUTPATIENT)
Dept: ADMINISTRATIVE | Facility: HOSPITAL | Age: 60
End: 2020-09-30

## 2020-09-30 DIAGNOSIS — D30.01 RENAL CELL ADENOMA OF RIGHT KIDNEY: Primary | ICD-10-CM

## 2020-10-01 ENCOUNTER — CONSULT (OUTPATIENT)
Dept: SURGERY | Facility: CLINIC | Age: 60
End: 2020-10-01
Payer: COMMERCIAL

## 2020-10-01 VITALS
BODY MASS INDEX: 23.16 KG/M2 | WEIGHT: 139 LBS | SYSTOLIC BLOOD PRESSURE: 110 MMHG | TEMPERATURE: 97.2 F | DIASTOLIC BLOOD PRESSURE: 60 MMHG | HEIGHT: 65 IN | HEART RATE: 65 BPM

## 2020-10-01 DIAGNOSIS — L72.3 SEBACEOUS CYST: Primary | ICD-10-CM

## 2020-10-01 DIAGNOSIS — Z98.84 S/P BARIATRIC SURGERY: ICD-10-CM

## 2020-10-01 PROCEDURE — 88304 TISSUE EXAM BY PATHOLOGIST: CPT | Performed by: PATHOLOGY

## 2020-10-01 PROCEDURE — 99204 OFFICE O/P NEW MOD 45 MIN: CPT | Performed by: SPECIALIST

## 2020-10-01 PROCEDURE — 1036F TOBACCO NON-USER: CPT | Performed by: SPECIALIST

## 2020-10-01 RX ORDER — MULTIVIT-MIN/IRON/FOLIC ACID/K 45-800-120
1 CAPSULE ORAL DAILY
COMMUNITY

## 2020-10-02 ENCOUNTER — HOSPITAL ENCOUNTER (OUTPATIENT)
Facility: AMBULARY SURGERY CENTER | Age: 60
Setting detail: OUTPATIENT SURGERY
Discharge: HOME | End: 2020-10-02
Attending: COLON & RECTAL SURGERY | Admitting: COLON & RECTAL SURGERY
Payer: COMMERCIAL

## 2020-10-02 ENCOUNTER — ANESTHESIA (OUTPATIENT)
Dept: PERIOP | Facility: AMBULARY SURGERY CENTER | Age: 60
End: 2020-10-02
Payer: COMMERCIAL

## 2020-10-02 VITALS
WEIGHT: 135.38 LBS | BODY MASS INDEX: 22.56 KG/M2 | HEART RATE: 65 BPM | SYSTOLIC BLOOD PRESSURE: 95 MMHG | OXYGEN SATURATION: 100 % | HEIGHT: 65 IN | DIASTOLIC BLOOD PRESSURE: 51 MMHG | RESPIRATION RATE: 18 BRPM | TEMPERATURE: 97.5 F

## 2020-10-02 VITALS — HEART RATE: 63 BPM

## 2020-10-02 DIAGNOSIS — D01.3 AIN III (ANAL INTRAEPITHELIAL NEOPLASIA III): ICD-10-CM

## 2020-10-02 PROCEDURE — 88305 TISSUE EXAM BY PATHOLOGIST: CPT | Performed by: PATHOLOGY

## 2020-10-02 PROCEDURE — 46607 DIAGNOSTIC ANOSCOPY & BIOPSY: CPT | Performed by: COLON & RECTAL SURGERY

## 2020-10-02 PROCEDURE — 88344 IMHCHEM/IMCYTCHM EA MLT ANTB: CPT | Performed by: PATHOLOGY

## 2020-10-02 PROCEDURE — 88312 SPECIAL STAINS GROUP 1: CPT | Performed by: PATHOLOGY

## 2020-10-02 RX ORDER — PROPOFOL 10 MG/ML
INJECTION, EMULSION INTRAVENOUS AS NEEDED
Status: DISCONTINUED | OUTPATIENT
Start: 2020-10-02 | End: 2020-10-02

## 2020-10-02 RX ORDER — ONDANSETRON 2 MG/ML
INJECTION INTRAMUSCULAR; INTRAVENOUS AS NEEDED
Status: DISCONTINUED | OUTPATIENT
Start: 2020-10-02 | End: 2020-10-02

## 2020-10-02 RX ORDER — BUPIVACAINE HYDROCHLORIDE 2.5 MG/ML
INJECTION, SOLUTION EPIDURAL; INFILTRATION; INTRACAUDAL AS NEEDED
Status: DISCONTINUED | OUTPATIENT
Start: 2020-10-02 | End: 2020-10-02 | Stop reason: HOSPADM

## 2020-10-02 RX ORDER — SODIUM CHLORIDE, SODIUM LACTATE, POTASSIUM CHLORIDE, CALCIUM CHLORIDE 600; 310; 30; 20 MG/100ML; MG/100ML; MG/100ML; MG/100ML
100 INJECTION, SOLUTION INTRAVENOUS CONTINUOUS
Status: DISCONTINUED | OUTPATIENT
Start: 2020-10-02 | End: 2020-10-02 | Stop reason: HOSPADM

## 2020-10-02 RX ORDER — ONDANSETRON 2 MG/ML
4 INJECTION INTRAMUSCULAR; INTRAVENOUS ONCE AS NEEDED
Status: DISCONTINUED | OUTPATIENT
Start: 2020-10-02 | End: 2020-10-02 | Stop reason: HOSPADM

## 2020-10-02 RX ORDER — FENTANYL CITRATE 50 UG/ML
INJECTION, SOLUTION INTRAMUSCULAR; INTRAVENOUS AS NEEDED
Status: DISCONTINUED | OUTPATIENT
Start: 2020-10-02 | End: 2020-10-02

## 2020-10-02 RX ORDER — LIDOCAINE HYDROCHLORIDE 10 MG/ML
INJECTION, SOLUTION EPIDURAL; INFILTRATION; INTRACAUDAL; PERINEURAL AS NEEDED
Status: DISCONTINUED | OUTPATIENT
Start: 2020-10-02 | End: 2020-10-02

## 2020-10-02 RX ORDER — SODIUM CHLORIDE, SODIUM LACTATE, POTASSIUM CHLORIDE, CALCIUM CHLORIDE 600; 310; 30; 20 MG/100ML; MG/100ML; MG/100ML; MG/100ML
INJECTION, SOLUTION INTRAVENOUS CONTINUOUS PRN
Status: DISCONTINUED | OUTPATIENT
Start: 2020-10-02 | End: 2020-10-02

## 2020-10-02 RX ORDER — LIDOCAINE HYDROCHLORIDE AND EPINEPHRINE 10; 10 MG/ML; UG/ML
INJECTION, SOLUTION INFILTRATION; PERINEURAL AS NEEDED
Status: DISCONTINUED | OUTPATIENT
Start: 2020-10-02 | End: 2020-10-02 | Stop reason: HOSPADM

## 2020-10-02 RX ORDER — PROPOFOL 10 MG/ML
INJECTION, EMULSION INTRAVENOUS CONTINUOUS PRN
Status: DISCONTINUED | OUTPATIENT
Start: 2020-10-02 | End: 2020-10-02

## 2020-10-02 RX ORDER — MIDAZOLAM HYDROCHLORIDE 2 MG/2ML
INJECTION, SOLUTION INTRAMUSCULAR; INTRAVENOUS AS NEEDED
Status: DISCONTINUED | OUTPATIENT
Start: 2020-10-02 | End: 2020-10-02

## 2020-10-02 RX ORDER — FENTANYL CITRATE/PF 50 MCG/ML
25 SYRINGE (ML) INJECTION
Status: DISCONTINUED | OUTPATIENT
Start: 2020-10-02 | End: 2020-10-02 | Stop reason: HOSPADM

## 2020-10-02 RX ADMIN — SODIUM CHLORIDE, SODIUM LACTATE, POTASSIUM CHLORIDE, AND CALCIUM CHLORIDE: .6; .31; .03; .02 INJECTION, SOLUTION INTRAVENOUS at 12:09

## 2020-10-02 RX ADMIN — MIDAZOLAM HYDROCHLORIDE 2 MG: 1 INJECTION, SOLUTION INTRAMUSCULAR; INTRAVENOUS at 12:28

## 2020-10-02 RX ADMIN — PHENYLEPHRINE HYDROCHLORIDE 100 MCG: 10 INJECTION INTRAVENOUS at 12:42

## 2020-10-02 RX ADMIN — FENTANYL CITRATE 50 MCG: 50 INJECTION, SOLUTION INTRAMUSCULAR; INTRAVENOUS at 12:37

## 2020-10-02 RX ADMIN — ONDANSETRON 4 MG: 2 INJECTION INTRAMUSCULAR; INTRAVENOUS at 12:34

## 2020-10-02 RX ADMIN — PHENYLEPHRINE HYDROCHLORIDE 100 MCG: 10 INJECTION INTRAVENOUS at 12:58

## 2020-10-02 RX ADMIN — PHENYLEPHRINE HYDROCHLORIDE 100 MCG: 10 INJECTION INTRAVENOUS at 12:50

## 2020-10-02 RX ADMIN — PROPOFOL 50 MG: 10 INJECTION, EMULSION INTRAVENOUS at 12:34

## 2020-10-02 RX ADMIN — LIDOCAINE HYDROCHLORIDE 50 MG: 10 INJECTION, SOLUTION EPIDURAL; INFILTRATION; INTRACAUDAL; PERINEURAL at 12:37

## 2020-10-02 RX ADMIN — PROPOFOL 100 MCG/KG/MIN: 10 INJECTION, EMULSION INTRAVENOUS at 12:34

## 2020-10-05 ENCOUNTER — SOCIAL WORK (OUTPATIENT)
Dept: BEHAVIORAL/MENTAL HEALTH CLINIC | Facility: CLINIC | Age: 60
End: 2020-10-05
Payer: COMMERCIAL

## 2020-10-05 DIAGNOSIS — F43.10 POST TRAUMATIC STRESS DISORDER (PTSD): ICD-10-CM

## 2020-10-05 DIAGNOSIS — F31.75 BIPOLAR 1 DISORDER, DEPRESSED, PARTIAL REMISSION (HCC): Primary | ICD-10-CM

## 2020-10-05 PROCEDURE — 90834 PSYTX W PT 45 MINUTES: CPT | Performed by: COUNSELOR

## 2020-10-13 ENCOUNTER — TELEPHONE (OUTPATIENT)
Dept: BARIATRICS | Facility: CLINIC | Age: 60
End: 2020-10-13

## 2020-10-13 ENCOUNTER — OFFICE VISIT (OUTPATIENT)
Dept: PSYCHIATRY | Facility: CLINIC | Age: 60
End: 2020-10-13
Payer: COMMERCIAL

## 2020-10-13 VITALS — WEIGHT: 136.6 LBS | BODY MASS INDEX: 22.73 KG/M2

## 2020-10-13 DIAGNOSIS — F41.9 ANXIETY DISORDER, UNSPECIFIED TYPE: ICD-10-CM

## 2020-10-13 DIAGNOSIS — F32.A ANXIETY AND DEPRESSION: ICD-10-CM

## 2020-10-13 DIAGNOSIS — F31.70: Primary | ICD-10-CM

## 2020-10-13 DIAGNOSIS — F41.9 ANXIETY AND DEPRESSION: ICD-10-CM

## 2020-10-13 PROCEDURE — 99213 OFFICE O/P EST LOW 20 MIN: CPT | Performed by: NURSE PRACTITIONER

## 2020-10-13 RX ORDER — FLUOXETINE HYDROCHLORIDE 40 MG/1
40 CAPSULE ORAL DAILY
Qty: 90 CAPSULE | Refills: 0 | Status: SHIPPED | OUTPATIENT
Start: 2020-10-13 | End: 2021-01-06 | Stop reason: SDUPTHER

## 2020-11-02 ENCOUNTER — DOCUMENTATION (OUTPATIENT)
Dept: BEHAVIORAL/MENTAL HEALTH CLINIC | Facility: CLINIC | Age: 60
End: 2020-11-02

## 2020-11-02 DIAGNOSIS — F31.75 BIPOLAR 1 DISORDER, DEPRESSED, PARTIAL REMISSION (HCC): Primary | ICD-10-CM

## 2020-11-02 DIAGNOSIS — F43.10 POST TRAUMATIC STRESS DISORDER (PTSD): ICD-10-CM

## 2020-11-06 ENCOUNTER — TELEPHONE (OUTPATIENT)
Dept: BARIATRICS | Facility: CLINIC | Age: 60
End: 2020-11-06

## 2020-11-09 ENCOUNTER — OFFICE VISIT (OUTPATIENT)
Dept: BARIATRICS | Facility: CLINIC | Age: 60
End: 2020-11-09

## 2020-11-09 VITALS — BODY MASS INDEX: 21.99 KG/M2 | WEIGHT: 128.8 LBS | HEIGHT: 64 IN

## 2020-11-09 DIAGNOSIS — K91.2 POSTSURGICAL MALABSORPTION: Primary | ICD-10-CM

## 2020-11-09 PROCEDURE — 3008F BODY MASS INDEX DOCD: CPT | Performed by: OBSTETRICS & GYNECOLOGY

## 2020-11-09 PROCEDURE — RECHECK

## 2020-11-11 ENCOUNTER — TELEPHONE (OUTPATIENT)
Dept: OBGYN CLINIC | Facility: CLINIC | Age: 60
End: 2020-11-11

## 2020-11-17 ENCOUNTER — TELEPHONE (OUTPATIENT)
Dept: OBGYN CLINIC | Facility: CLINIC | Age: 60
End: 2020-11-17

## 2020-11-20 ENCOUNTER — TELEMEDICINE (OUTPATIENT)
Dept: OBGYN CLINIC | Facility: CLINIC | Age: 60
End: 2020-11-20
Payer: COMMERCIAL

## 2020-11-20 DIAGNOSIS — N95.1 SYMPTOMATIC MENOPAUSAL OR FEMALE CLIMACTERIC STATES: Primary | ICD-10-CM

## 2020-11-20 DIAGNOSIS — R68.82 DECREASED LIBIDO: ICD-10-CM

## 2020-11-20 PROCEDURE — 1036F TOBACCO NON-USER: CPT | Performed by: OBSTETRICS & GYNECOLOGY

## 2020-11-20 PROCEDURE — 99214 OFFICE O/P EST MOD 30 MIN: CPT | Performed by: OBSTETRICS & GYNECOLOGY

## 2020-11-20 RX ORDER — ESTRADIOL 0.5 MG/1
0.5 TABLET ORAL DAILY
Qty: 90 TABLET | Refills: 3 | Status: SHIPPED | OUTPATIENT
Start: 2020-11-20 | End: 2021-01-08 | Stop reason: SDUPTHER

## 2020-11-20 RX ORDER — LANOLIN ALCOHOL/MO/W.PET/CERES
1 CREAM (GRAM) TOPICAL DAILY
COMMUNITY

## 2020-12-18 ENCOUNTER — OFFICE VISIT (OUTPATIENT)
Dept: BARIATRICS | Facility: CLINIC | Age: 60
End: 2020-12-18

## 2020-12-18 VITALS — TEMPERATURE: 94.7 F | BODY MASS INDEX: 22.81 KG/M2 | WEIGHT: 133.6 LBS | HEIGHT: 64 IN

## 2020-12-18 DIAGNOSIS — K91.2 POSTSURGICAL MALABSORPTION: Primary | ICD-10-CM

## 2020-12-18 PROCEDURE — 3008F BODY MASS INDEX DOCD: CPT | Performed by: OBSTETRICS & GYNECOLOGY

## 2020-12-18 PROCEDURE — RECHECK

## 2020-12-22 ENCOUNTER — TELEMEDICINE (OUTPATIENT)
Dept: SLEEP CENTER | Facility: CLINIC | Age: 60
End: 2020-12-22
Payer: COMMERCIAL

## 2020-12-22 ENCOUNTER — TELEPHONE (OUTPATIENT)
Dept: SLEEP CENTER | Facility: CLINIC | Age: 60
End: 2020-12-22

## 2020-12-22 DIAGNOSIS — F45.8 BRUXISM: ICD-10-CM

## 2020-12-22 DIAGNOSIS — F32.A ANXIETY AND DEPRESSION: ICD-10-CM

## 2020-12-22 DIAGNOSIS — G47.33 OSA (OBSTRUCTIVE SLEEP APNEA): Primary | ICD-10-CM

## 2020-12-22 DIAGNOSIS — F41.9 ANXIETY AND DEPRESSION: ICD-10-CM

## 2020-12-22 DIAGNOSIS — R23.2 HOT FLASHES: ICD-10-CM

## 2020-12-22 DIAGNOSIS — Z98.84 S/P BARIATRIC SURGERY: ICD-10-CM

## 2020-12-22 DIAGNOSIS — J30.1 SEASONAL ALLERGIC RHINITIS DUE TO POLLEN: ICD-10-CM

## 2020-12-22 PROCEDURE — 99214 OFFICE O/P EST MOD 30 MIN: CPT | Performed by: INTERNAL MEDICINE

## 2020-12-30 ENCOUNTER — TELEPHONE (OUTPATIENT)
Dept: SLEEP CENTER | Facility: CLINIC | Age: 60
End: 2020-12-30

## 2020-12-30 DIAGNOSIS — G47.33 OSA (OBSTRUCTIVE SLEEP APNEA): Primary | ICD-10-CM

## 2020-12-30 NOTE — TELEPHONE ENCOUNTER
Patients insurance denied the in lab sleep study, do you want a home sleep study instead?  She leaves for [place] on Monday so she said she will probably have to hold off on the sleep study for now

## 2021-01-05 ENCOUNTER — TRANSCRIBE ORDERS (OUTPATIENT)
Dept: SLEEP CENTER | Facility: CLINIC | Age: 61
End: 2021-01-05

## 2021-01-06 ENCOUNTER — TELEMEDICINE (OUTPATIENT)
Dept: PSYCHIATRY | Facility: CLINIC | Age: 61
End: 2021-01-06
Payer: COMMERCIAL

## 2021-01-06 DIAGNOSIS — F32.A ANXIETY AND DEPRESSION: ICD-10-CM

## 2021-01-06 DIAGNOSIS — F41.9 ANXIETY AND DEPRESSION: ICD-10-CM

## 2021-01-06 DIAGNOSIS — F31.76 BIPOLAR DISORDER, IN FULL REMISSION, MOST RECENT EPISODE DEPRESSED (HCC): Primary | ICD-10-CM

## 2021-01-06 PROCEDURE — 99214 OFFICE O/P EST MOD 30 MIN: CPT | Performed by: NURSE PRACTITIONER

## 2021-01-06 RX ORDER — FLUOXETINE HYDROCHLORIDE 40 MG/1
40 CAPSULE ORAL DAILY
Qty: 90 CAPSULE | Refills: 0 | Status: SHIPPED | OUTPATIENT
Start: 2021-01-06 | End: 2021-03-29 | Stop reason: SDUPTHER

## 2021-01-06 NOTE — PSYCH
PROGRESS NOTE        [place] [facility]      Name and Date of Birth:  Carey Knowles 64 y o  1960    Date of Visit: 01/06/21    Pt was spoken to for medication management for 30 minutes by phone because pt was unable to connect via Teams  Door to office was closed; provider was alone in office, pt aware and consented to phone session  SUBJECTIVE: her medication is working, she would like to continue to take it  She is in [place]  Weight is stable at 138 lbs, still remembers dreams, trying not to take them seriously  [facility] beckman are working wonders, helping her with codependency  10/13/2020: took fluoxetine 2 days per week to decrease cognitive slowing, emotional flattening, apathy, and increased negative thoughts; took brand Prozac Monday and Friday to extend supply, remembers dreams with fluoxetine but didn't with Prozac; happy with her weight loss      She denies suicidal ideation, intent or plan at present, has no suicidal ideation, intent or plan at present  She denies any auditory hallucinations and visual hallucinations, denies any other delusional thinking, denies any delusional thinking  She denies any side effects from medications    HPI ROS Appetite Changes and Sleep: normal appetite, normal sleep    Review Of Systems:      Constitutional Negative   ENT Negative   Cardiovascular Negative   Respiratory Negative   Gastrointestinal Negative   Genitourinary Negative   Musculoskeletal Negative   Integumentary Negative   Neurological Negative   Endocrine Negative   Other Symptoms Negative and None       Laboratory Results: No results found for this or any previous visit      Substance Abuse History:    Social History     Substance and Sexual Activity   Drug Use No       Family Psychiatric History:     Family History   Problem Relation Age of Onset    Other Mother         damage to diaphragm s/p surgery    Rheum arthritis Mother     Stroke Mother 61        mini strokes    Diabetes type II Mother 61    Bipolar disorder Mother     Depression Mother     Anxiety disorder Mother     Arthritis Mother     Hyperlipidemia Mother         Onset/diagnosed in her 66's    [de-identified] / [place] Mother     Osteoarthritis Mother     Hyperlipidemia Father         Onset unknown    Breast cancer Sister 36    Depression Sister     Anxiety disorder Sister     Mental illness Sister         DID diagnosis    Osteoarthritis Sister     Hypertension Paternal Grandmother 79    Vision loss Paternal Grandmother         ARMD + stroke in one eye    Heart disease Paternal Grandmother     Addiction problem Son     Bipolar disorder Son     Depression Son     Anxiety disorder Son     Drug abuse Son     Substance Abuse Son     Alcohol abuse Son     Deep vein thrombosis Son     Osteoarthritis Maternal Grandmother        The following portions of the patient's history were reviewed and updated as appropriate: past family history, past medical history, past social history, past surgical history and problem list     Social History     Socioeconomic History    Marital status: /Civil Union     Spouse name: Not on file    Number of children: Not on file    Years of education: Not on file    Highest education level: Not on file   Occupational History    Not on file   Social Needs    Financial resource strain: Not on file    Food insecurity     Worry: Not on file     Inability: Not on file    Transportation needs     Medical: Not on file     Non-medical: Not on file   Tobacco Use    Smoking status: Former Smoker     Packs/day: 0 25     Years: 10 00     Pack years: 2 50     Types: Cigarettes, Cigars     Start date: 2008     Quit date: 2018     Years since quittin 5    Smokeless tobacco: Never Used    Tobacco comment: quit 2018   Substance and Sexual Activity    Alcohol use:  Yes     Alcohol/week: 2 0 standard drinks     Types: 1 Glasses of wine, 1 Cans of beer per week     Frequency: 2-4 times a month     Drinks per session: 1 or 2     Binge frequency: Never     Comment: socially    Drug use: No    Sexual activity: Yes     Partners: Male     Birth control/protection: Post-menopausal   Lifestyle    Physical activity     Days per week: Not on file     Minutes per session: Not on file    Stress: Not on file   Relationships    Social connections     Talks on phone: Not on file     Gets together: Not on file     Attends Worship service: Not on file     Active member of club or organization: Not on file     Attends meetings of clubs or organizations: Not on file     Relationship status: Not on file    Intimate partner violence     Fear of current or ex partner: Not on file     Emotionally abused: Not on file     Physically abused: Not on file     Forced sexual activity: Not on file   Other Topics Concern    Not on file   Social History Narrative    Not on file     Social History     Social History Narrative    Not on file        Social History     Tobacco History     Smoking Status  Former Smoker Smoking Start Date  7/7/2008 Quit date  7/7/2018 Smoking Frequency  0 25 packs/day for 10 years (2 5 pk yrs)    Smoking Tobacco Type  Cigarettes, Cigars    Smokeless Tobacco Use  Never Used    Tobacco Comment  quit 7/8/2018          Alcohol History     Alcohol Use Status  Yes Drinks/Week  1 Glasses of wine, 1 Cans of beer per week Amount  2 0 standard drinks of alcohol/wk Comment  socially          Drug Use     Drug Use Status  No          Sexual Activity     Sexually Active  Yes Partners  Male Birth Control/Protection  Post-menopausal          Activities of Daily Living    Not Asked                     OBJECTIVE:     Mental Status Evaluation:    Appearance Phone session, not assessed   Behavior pleasant, cooperative   Speech normal volume, normal pitch   Mood euthymic   Affect Phone session, not assessed   Thought Processes logical Associations intact associations   Thought Content normal   Perceptual Disturbances: none   Abnormal Thoughts  Risk Potential Suicidal ideation - None  Homicidal ideation - None  Potential for aggression - No   Orientation oriented to person, place, time/date and situation   Memory recent and remote memory grossly intact   Cosciousness alert and awake   Attention Span attention span and concentration are age appropriate   Intellect Not formally assessed   Insight age appropriate    Judgement good    Muscle Strength and  Gait Phone session, not assessed   Language no difficulty naming common objects   Fund of Knowledge displays adequate knowledge of current events   Pain none   Pain Scale 0       Assessment/Plan:       Diagnoses and all orders for this visit:    Bipolar disorder, in full remission, most recent episode depressed ([facility] [address] )          Treatment Recommendations/Precautions:    Continue current medications: fluoxetine 40 mg cap 1 cap po qd    Risks/Benefits      Risks, Benefits And Possible Side Effects Of Medications:    Risks, benefits, and possible side effects of medications explained to patient and patient verbalizes understanding and agreement for treatment      Controlled Medication Discussion:     Not applicable    Psychotherapy Provided:     Individual psychotherapy provided: No

## 2021-01-08 DIAGNOSIS — R68.82 DECREASED LIBIDO: ICD-10-CM

## 2021-01-08 DIAGNOSIS — N95.1 SYMPTOMATIC MENOPAUSAL OR FEMALE CLIMACTERIC STATES: ICD-10-CM

## 2021-01-11 DIAGNOSIS — N95.1 SYMPTOMATIC MENOPAUSAL OR FEMALE CLIMACTERIC STATES: ICD-10-CM

## 2021-01-11 DIAGNOSIS — R68.82 DECREASED LIBIDO: ICD-10-CM

## 2021-01-11 RX ORDER — ESTRADIOL 0.5 MG/1
0.5 TABLET ORAL DAILY
Qty: 90 TABLET | Refills: 3 | Status: SHIPPED | OUTPATIENT
Start: 2021-01-11 | End: 2021-09-08 | Stop reason: SDUPTHER

## 2021-01-11 RX ORDER — ESTRADIOL 0.5 MG/1
0.5 TABLET ORAL DAILY
Qty: 90 TABLET | Refills: 3 | Status: SHIPPED | OUTPATIENT
Start: 2021-01-11 | End: 2021-01-11 | Stop reason: SDUPTHER

## 2021-01-11 NOTE — TELEPHONE ENCOUNTER
Duplicate message  Pt sent a mychart written message to Dr Valery Tran requesting a new RX be sent to [facility] due to insurance       Pt last seen on 11/20/20 virtually with Dr Valery Tran

## 2021-02-03 ENCOUNTER — TELEPHONE (OUTPATIENT)
Dept: SLEEP CENTER | Facility: CLINIC | Age: 61
End: 2021-02-03

## 2021-02-03 NOTE — TELEPHONE ENCOUNTER
Patient called to schedule her HST, [place], she needs to quarantine for 2 weeks or have a negative COVID test, she leaves again on the 26th  We don't have anything for the 2 weeks she's home so she will [facility]

## 2021-02-15 ENCOUNTER — TRANSCRIBE ORDERS (OUTPATIENT)
Dept: ADMINISTRATIVE | Facility: HOSPITAL | Age: 61
End: 2021-02-15

## 2021-02-15 DIAGNOSIS — D30.00 BENIGN NEOPLASM OF UNSPECIFIED KIDNEY: Primary | ICD-10-CM

## 2021-02-15 NOTE — ASSESSMENT & PLAN NOTE
-No longer wearing CPAP, feeling well rested, no snoring  -Attempted to f/u with repeat sleep study but insurance wouldn't cover

## 2021-02-15 NOTE — ASSESSMENT & PLAN NOTE
-s/p Cornelio-En-Y Gastric Bypass with Dr Daphne Flores on 02/17/20  Overall doing very well with weight loss  EWL is 112%, which places the patient very ahead of schedule for expected post surgical weight loss  She has achieved a healthy BMI and is maintaining her weight around 130lbs  She feels great and has no complaints, however, she is consuming excessive refined CHO, candy, ETOH, coffee, and diet soda  Lengthy discussion about avoidance of these unhealthy options and empty calories and she agrees to make changes and continue f/u with RD  Initial: 226 3lbs  Current:  130lbs per home scale  EWL: 112%  Lopez: 128 8lbs 6 months postop  Current BMI is Body mass index is 22 31 kg/m²  · Tolerating a regular diet-yes  · Eating at least 60 grams of protein per day-yes  · Following 30/60 minute rule with liquids-yes  · Drinking at least 64 ounces of fluid per day-yes  · Drinking carbonated beverages-yes diet soda, advised her to avoid  · Sufficient exercise-yes, walks 10,000 steps daily when living in [facility]; advised trying HIIT   · Using NSAIDs regularly-no  · Using nicotine-no  · Using alcohol-yes has wine or beer 1-2x/week   Advised about the risks of alcohol s/p bariatric surgery and recommend avoiding all alcohol

## 2021-02-15 NOTE — ASSESSMENT & PLAN NOTE
-At risk for malabsorption of vitamins/minerals secondary to malabsorption and restriction of intake from bariatric surgery  -Currently taking adequate postop bariatric surgery vitamin supplementation:  Procare MVI w/ 18mg iron, calcium citrate 500mg TID  -First set of bariatric labs 08/10/20 show:       -B12 (484) low normal - start additional 1,000mcg sublingual B12 daily x2-3 months       -Ferritin very elevated (439) - iron and H/H WNL (liver ultrasound in August WNL)    -Repeat metabolic panel ordered  -Patient received education about the importance of adhering to a lifelong supplementation regimen to avoid vitamin/mineral deficiencies

## 2021-02-16 ENCOUNTER — HOSPITAL ENCOUNTER (OUTPATIENT)
Dept: RADIOLOGY | Facility: HOSPITAL | Age: 61
Discharge: HOME | End: 2021-02-16
Payer: COMMERCIAL

## 2021-02-16 DIAGNOSIS — D30.00 BENIGN NEOPLASM OF UNSPECIFIED KIDNEY: ICD-10-CM

## 2021-02-16 PROCEDURE — 76770 US EXAM ABDO BACK WALL COMP: CPT

## 2021-02-17 ENCOUNTER — TELEMEDICINE (OUTPATIENT)
Dept: BARIATRICS | Facility: CLINIC | Age: 61
End: 2021-02-17
Payer: COMMERCIAL

## 2021-02-17 VITALS — BODY MASS INDEX: 22.2 KG/M2 | HEIGHT: 64 IN | WEIGHT: 130 LBS

## 2021-02-17 DIAGNOSIS — K91.2 POSTSURGICAL MALABSORPTION: ICD-10-CM

## 2021-02-17 DIAGNOSIS — F32.A ANXIETY AND DEPRESSION: ICD-10-CM

## 2021-02-17 DIAGNOSIS — Z48.815 ENCOUNTER FOR SURGICAL AFTERCARE FOLLOWING SURGERY OF DIGESTIVE SYSTEM: Primary | ICD-10-CM

## 2021-02-17 DIAGNOSIS — G47.33 OSA (OBSTRUCTIVE SLEEP APNEA): ICD-10-CM

## 2021-02-17 DIAGNOSIS — E78.2 MIXED HYPERLIPIDEMIA: ICD-10-CM

## 2021-02-17 DIAGNOSIS — F41.9 ANXIETY AND DEPRESSION: ICD-10-CM

## 2021-02-17 PROCEDURE — 99214 OFFICE O/P EST MOD 30 MIN: CPT | Performed by: PHYSICIAN ASSISTANT

## 2021-02-17 RX ORDER — TOLTERODINE 4 MG/1
CAPSULE, EXTENDED RELEASE ORAL
COMMUNITY
Start: 2021-02-08

## 2021-03-29 ENCOUNTER — TELEMEDICINE (OUTPATIENT)
Dept: PSYCHIATRY | Facility: CLINIC | Age: 61
End: 2021-03-29
Payer: COMMERCIAL

## 2021-03-29 DIAGNOSIS — F31.76 BIPOLAR DISORDER, IN FULL REMISSION, MOST RECENT EPISODE DEPRESSED (HCC): Primary | ICD-10-CM

## 2021-03-29 DIAGNOSIS — F41.9 ANXIETY AND DEPRESSION: ICD-10-CM

## 2021-03-29 DIAGNOSIS — F32.A ANXIETY AND DEPRESSION: ICD-10-CM

## 2021-03-29 DIAGNOSIS — F41.1 GENERALIZED ANXIETY DISORDER: ICD-10-CM

## 2021-03-29 PROCEDURE — 90833 PSYTX W PT W E/M 30 MIN: CPT | Performed by: NURSE PRACTITIONER

## 2021-03-29 PROCEDURE — 99214 OFFICE O/P EST MOD 30 MIN: CPT | Performed by: NURSE PRACTITIONER

## 2021-03-29 PROCEDURE — 1036F TOBACCO NON-USER: CPT | Performed by: NURSE PRACTITIONER

## 2021-03-29 RX ORDER — FLUOXETINE HYDROCHLORIDE 40 MG/1
40 CAPSULE ORAL DAILY
Qty: 90 CAPSULE | Refills: 0 | Status: SHIPPED | OUTPATIENT
Start: 2021-03-29 | End: 2021-06-21 | Stop reason: SDUPTHER

## 2021-03-29 NOTE — PSYCH
PROGRESS NOTE        [facility]      Name and Date of Birth:  Peterson Perez 64 y o  1960    Date of Visit: 03/29/21    Pt was spoken to today by phone for medication management and psychotherapy; pt was unable to connect via Teams  Door to office was closed; provider was alone in office, pt aware and consented to phone session  Time spent on psychotherapy: 18 Minutes    Treatment modality: supportive, insight-oriented     SUBJECTIVE: Prozac (generic) continues to feel remission of depressive symptoms, and of anxiety; moods are stable  She is still in [place], will be driving up to [place], and then back to [facility] with her  and bringing her [place] with her  Continues to attend [facility], is helping tremendously, including spiritually  Attending AA, has her first sponsee  Intends eventually to move permanently to [facility], then come to visit family up here  Has not received COVID vaccine yet but plans to  Feels so much healthier than a year ago, she got a gastric bypass last February, has been eating healthier, not trying to fix her family - living far from them has been very helpful  1/6/2021: her medication is working, she would like to continue to take it  She is in [place]  Weight is stable at 138 lbs, still remembers dreams, trying not to take them seriously  [facility] meetings are working wonders, helping her with codependency      10/13/2020: took fluoxetine 2 days per week to decrease cognitive slowing, emotional flattening, apathy, and increased negative thoughts; took brand Prozac Monday and Friday to extend supply, remembers dreams with fluoxetine but didn't with Prozac; happy with her weight loss      She denies suicidal ideation, intent or plan at present, has no suicidal ideation, intent or plan at present      She denies any auditory hallucinations and visual hallucinations, denies any other delusional thinking, denies any delusional thinking  She denies any side effects from medications    HPI ROS Appetite Changes and Sleep: normal appetite, normal sleep    Review Of Systems:      Constitutional Negative   ENT Negative   Cardiovascular Negative   Respiratory Negative   Gastrointestinal Negative   Genitourinary Negative   Musculoskeletal Negative   Integumentary Negative   Neurological Negative   Endocrine Negative   Other Symptoms Negative and None       Laboratory Results: No results found for this or any previous visit      Substance Abuse History:    Social History     Substance and Sexual Activity   Drug Use No       Family Psychiatric History:     Family History   Problem Relation Age of Onset    Other Mother         damage to diaphragm s/p surgery    Rheum arthritis Mother     Stroke Mother 61        mini strokes    Diabetes type II Mother 61   [place] Bipolar disorder Mother     Depression Mother     Anxiety disorder Mother     Arthritis Mother     Hyperlipidemia Mother         Onset/diagnosed in her 66's    [de-identified] / [place] Mother     Osteoarthritis Mother     Hyperlipidemia Father         Onset unknown    Breast cancer Sister 36    Depression Sister     Anxiety disorder Sister     Mental illness Sister         DID diagnosis    Osteoarthritis Sister     Hypertension Paternal Grandmother 79    Vision loss Paternal Grandmother         ARMD + stroke in one eye    Heart disease Paternal Grandmother     Addiction problem Son     Bipolar disorder Son     Depression Son     Anxiety disorder Son     Drug abuse Son     Substance Abuse Son     Alcohol abuse Son     Deep vein thrombosis Son     Osteoarthritis Maternal Grandmother        The following portions of the patient's history were reviewed and updated as appropriate: past family history, past medical history, past social history, past surgical history and problem list     Social History     Socioeconomic History    Marital status: /Civil Union Spouse name: Not on file    Number of children: Not on file    Years of education: Not on file    Highest education level: Not on file   Occupational History    Not on file   Social Needs    Financial resource strain: Not on file    Food insecurity     Worry: Not on file     Inability: Not on file    Transportation needs     Medical: Not on file     Non-medical: Not on file   Tobacco Use    Smoking status: Former Smoker     Packs/day: 0 25     Years: 10 00     Pack years: 2 50     Types: Cigarettes, Cigars     Start date: 2008     Quit date: 2018     Years since quittin 7    Smokeless tobacco: Never Used    Tobacco comment: quit 2018   Substance and Sexual Activity    Alcohol use:  Yes     Alcohol/week: 2 0 standard drinks     Types: 1 Glasses of wine, 1 Cans of beer per week     Frequency: 2-4 times a month     Drinks per session: 1 or 2     Binge frequency: Never     Comment: socially    Drug use: No    Sexual activity: Yes     Partners: Male     Birth control/protection: Post-menopausal   Lifestyle    Physical activity     Days per week: Not on file     Minutes per session: Not on file    Stress: Not on file   Relationships    Social connections     Talks on phone: Not on file     Gets together: Not on file     Attends Buddhist service: Not on file     Active member of club or organization: Not on file     Attends meetings of clubs or organizations: Not on file     Relationship status: Not on file    Intimate partner violence     Fear of current or ex partner: Not on file     Emotionally abused: Not on file     Physically abused: Not on file     Forced sexual activity: Not on file   Other Topics Concern    Not on file   Social History Narrative    Not on file     Social History     Social History Narrative    Not on file        Social History     Tobacco History     Smoking Status  Former Smoker Smoking Start Date  2008 Quit date  2018 Smoking Frequency  0 25 packs/day for 10 years (2 5 pk yrs)    Smoking Tobacco Type  Cigarettes, Cigars    Smokeless Tobacco Use  Never Used    Tobacco Comment  quit 7/8/2018          Alcohol History     Alcohol Use Status  Yes Drinks/Week  1 Glasses of wine, 1 Cans of beer per week Amount  2 0 standard drinks of alcohol/wk Comment  socially          Drug Use     Drug Use Status  No          Sexual Activity     Sexually Active  Yes Partners  Male Birth Control/Protection  Post-menopausal          Activities of Daily Living    Not Asked                     OBJECTIVE:     Mental Status Evaluation:         Appearance Phone appointment, not assessed   Behavior pleasant, cooperative   Speech normal volume, normal pitch   Mood euthymic   Affect Phone appointment, not assessed   Thought Processes Coherent, organized, goal-directed   Associations intact associations   Thought Content normal   Perceptual Disturbances: none   Abnormal Thoughts  Risk Potential Suicidal ideation - None  Homicidal ideation - None  Potential for aggression - No   Orientation oriented to person, place, time/date and situation   Memory recent and remote memory grossly intact   Cosciousness alert and awake   Attention Span attention span and concentration are age appropriate   Intellect Not formally assessed   Insight age appropriate    Judgement good    Muscle Strength and  Gait Phone appointment, not assessed   Language no difficulty naming common objects   Fund of Knowledge displays adequate knowledge of current events   Pain none   Pain Scale 0       Assessment/Plan:       Diagnoses and all orders for this visit:    Bipolar disorder, in full remission, most recent episode depressed ([facility] [address] )    Generalized anxiety disorder          Treatment Recommendations/Precautions:    Continue current medications: fluoxetine 40 mg cap 1 cap po qd    Risks/Benefits      Risks, Benefits And Possible Side Effects Of Medications:    Risks, benefits, and possible side effects of medications explained to patient and patient verbalizes understanding and agreement for treatment      Controlled Medication Discussion:     Not applicable

## 2021-03-29 NOTE — BH TREATMENT PLAN
TREATMENT PLAN         [address] [facility] [employer]    Name and Date of Birth:  Anabel French 64 y o  1960    Date of Treatment Plan: March 29, 2021    Diagnosis/Diagnoses:    1  Bipolar disorder, in full remission, most recent episode depressed ([facility] [address] )    2  Generalized anxiety disorder         Strengths/Personal Resources for Self-Care: taking medications as prescribed, ability to communicate well, motivation for treatment      Area/Areas of need (in own words): anxiety symptoms, depressive symptoms  1          Long Term Goal: maintain control of depression and anxiety  Target date - 9/29/2021  Person/Persons responsible for completion of goal: josselin Villa     2          Short Term Objective (s) - How will we reach this goal?:   A  Provider new recommended medication/dosage changes and/or continue medication(s):Prozac  B  take medication as discussed/prescribed, attend scheduled appts  C  N/A  Target date - 9/29/2021  Person/Persons Responsible for Completion of Goal: josselin Villa     Progress Towards Goals: stable     Treatment Modality: medication management every 12 weeks     Review due 120 - 180 days from date of this plan:  9/29/2021  Expected length of service: maintenance  My Physician/PA/NP and I have developed this plan together and I agree to work on the goals and objectives  I understand the treatment goals that were developed for my treatment

## 2021-04-13 DIAGNOSIS — Z23 ENCOUNTER FOR IMMUNIZATION: ICD-10-CM

## 2021-06-20 NOTE — PSYCH
This note was not shared with the patient due to this is a psychotherapy note    PROGRESS NOTE        [facility]      Name and Date of Birth:  Lubna Herman 64 y o  1960    Date of Visit: 06/20/21    Patient was seen in the office today for medication management and psychotherapy  COVID-19 precautions were followed at all times: masks were worn by pt and by writer at all times, social distancing was maintained, hand-washing was performed before and after appointment; and room was ventilated before pt entered the room and after pt left it  Time spent on psychotherapy:  16 minutes    Treatment modality: medication management; medication education, reinforce adaptive behavior      SUBJECTIVE: doing really well, going to [facility] [employer], has a sponsor, doing so much better  No difference between generic and brand Prozac  Uses tools she has learned in [facility] and elsewhere with excellent effect  Going back to [facility] soon  3/29/2021:  Prozac (generic) continues to feel remission of depressive symptoms, and of anxiety; moods are stable  She is still in [place], will be driving up to [place], and then back to [facility] with her  and bringing her [place] with her  Continues to attend [facility], is helping tremendously, including spiritually  Attending AA, has her first sponsee  Intends eventually to move permanently to [facility], then come to visit family up here  Has not received COVID vaccine yet but plans to  Feels so much healthier than a year ago, she got a gastric bypass last February, has been eating healthier, not trying to fix her family - living far from them has been very helpful       1/6/2021: her medication is working, she would like to continue to take it  She is in [place]  Weight is stable at 138 lbs, still remembers dreams, trying not to take them seriously   [facility] meetings are working wonders, helping her with codependency      10/13/2020: took fluoxetine 2 days per week to decrease cognitive slowing, emotional flattening, apathy, and increased negative thoughts; took brand Prozac Monday and Friday to extend supply, remembers dreams with fluoxetine but didn't with Prozac; happy with her weight loss      She denies suicidal ideation, intent or plan at present, has no suicidal ideation, intent or plan at present  She denies any auditory hallucinations and visual hallucinations, denies any other delusional thinking, denies any delusional thinking  She denies any side effects from medications    HPI ROS Appetite Changes and Sleep: normal appetite, normal sleep    Review Of Systems:      Constitutional Negative   ENT Negative   Cardiovascular Negative   Respiratory Negative   Gastrointestinal Negative   Genitourinary Negative   Musculoskeletal Negative   Integumentary Negative   Neurological Negative   Endocrine Negative   Other Symptoms Negative and None       Laboratory Results: No results found for this or any previous visit      Substance Abuse History:    Social History     Substance and Sexual Activity   Drug Use No       Family Psychiatric History:     Family History   Problem Relation Age of Onset    Other Mother         damage to diaphragm s/p surgery    Rheum arthritis Mother     Stroke Mother 61        mini strokes    Diabetes type II Mother 61   Delcie Petar Bipolar disorder Mother     Depression Mother     Anxiety disorder Mother     Arthritis Mother     Hyperlipidemia Mother         Onset/diagnosed in her 66's    [de-identified] / [place] Mother     Osteoarthritis Mother     Hyperlipidemia Father         Onset unknown    Breast cancer Sister 36    Depression Sister     Anxiety disorder Sister     Mental illness Sister         DID diagnosis    Osteoarthritis Sister     Hypertension Paternal Grandmother 79    Vision loss Paternal Grandmother         ARMD + stroke in one eye    Heart disease Paternal Grandmother     Addiction problem Son     Bipolar disorder Son     Depression Son     Anxiety disorder Son    Irwin Fisher Drug abuse Son     Substance Abuse Son     Alcohol abuse Son     Deep vein thrombosis Son     Osteoarthritis Maternal Grandmother        The following portions of the patient's history were reviewed and updated as appropriate: past family history, past medical history, past social history, past surgical history and problem list     Social History     Socioeconomic History    Marital status: /Civil Union     Spouse name: Not on file    Number of children: Not on file    Years of education: Not on file    Highest education level: Not on file   Occupational History    Not on file   Tobacco Use    Smoking status: Former Smoker     Packs/day: 0 25     Years: 10 00     Pack years: 2 50     Types: Cigarettes, Cigars     Start date: 2008     Quit date: 2018     Years since quittin 9    Smokeless tobacco: Never Used    Tobacco comment: quit 2018   Vaping Use    Vaping Use: Former    Start date: 2018    Quit date: 2018   Substance and Sexual Activity    Alcohol use: Yes     Alcohol/week: 2 0 standard drinks     Types: 1 Glasses of wine, 1 Cans of beer per week     Comment: socially    Drug use: No    Sexual activity: Yes     Partners: Male     Birth control/protection: Post-menopausal   Other Topics Concern    Not on file   Social History Narrative    Not on file     Social Determinants of Health     Financial Resource Strain:     Difficulty of Paying Living Expenses:    Food Insecurity:     Worried About Running Out of Food in the Last Year:     Ran Out of Food in the Last Year:    Transportation Needs:     Lack of Transportation (Medical):      Lack of Transportation (Non-Medical):    Physical Activity:     Days of Exercise per Week:     Minutes of Exercise per Session:    Stress:     Feeling of Stress :    Social Connections:     Frequency of Communication with Friends and Family:     Frequency of Social Gatherings with Friends and Family:     Attends Judaism Services:     Active Member of Clubs or Organizations:     Attends Club or Organization Meetings:     Marital Status:    Intimate Partner Violence:     Fear of Current or Ex-Partner:     Emotionally Abused:     Physically Abused:     Sexually Abused:      Social History     Social History Narrative    Not on file        Social History     Tobacco History     Smoking Status  Former Smoker Smoking Start Date  7/7/2008 Quit date  7/7/2018 Smoking Frequency  0 25 packs/day for 10 years (2 5 pk yrs)    Smoking Tobacco Type  Cigarettes, Cigars    Smokeless Tobacco Use  Never Used    Tobacco Comment  quit 7/8/2018          Alcohol History     Alcohol Use Status  Yes Drinks/Week  1 Glasses of wine, 1 Cans of beer per week Amount  2 0 standard drinks of alcohol/wk Comment  socially          Drug Use     Drug Use Status  No          Sexual Activity     Sexually Active  Yes Partners  Male Birth Control/Protection  Post-menopausal          Activities of Daily Living    Not Asked                     OBJECTIVE:     Mental Status Evaluation:    Appearance age appropriate, casually dressed   Behavior pleasant, cooperative   Speech normal volume, normal pitch   Mood happy   Affect Mood-congruent   Thought Processes Coherent, organized, goal-directed   Associations intact associations   Thought Content normal   Perceptual Disturbances: none   Abnormal Thoughts  Risk Potential Suicidal ideation - None  Homicidal ideation - None  Potential for aggression - No   Orientation oriented to person, place, time/date and situation   Memory recent and remote memory grossly intact   Cosciousness alert and awake   Attention Span attention span and concentration are age appropriate   Intellect Not formally assessed   Insight age appropriate    Judgement good    Muscle Strength and  Gait muscle strength and tone were normal   Language no difficulty naming common objects Fund of Knowledge displays adequate knowledge of current events   Pain none   Pain Scale 0     Patient's chart was reviewed for relevant lab reports and recent encounters with other providers  Medications, treatment progress and treatment plan were reviewed with patient  Assessment/Plan:       Diagnoses and all orders for this visit:    Bipolar disorder, in full remission, most recent episode depressed ([facility] [address] )    Generalized anxiety disorder        Treatment Recommendations/Precautions:    Continue current medications:   Fluoxetine 40 mg capsule - take 1 capsule by mouth every day    Risks/Benefits      Risks, Benefits And Possible Side Effects Of Medications:    Risks, benefits, and possible side effects of medications explained to patient and patient verbalizes understanding and agreement for treatment      Controlled Medication Discussion:     Not applicable

## 2021-06-21 ENCOUNTER — OFFICE VISIT (OUTPATIENT)
Dept: PSYCHIATRY | Facility: CLINIC | Age: 61
End: 2021-06-21
Payer: COMMERCIAL

## 2021-06-21 VITALS — WEIGHT: 139.8 LBS | BODY MASS INDEX: 23.29 KG/M2 | HEIGHT: 65 IN

## 2021-06-21 DIAGNOSIS — F32.A ANXIETY AND DEPRESSION: ICD-10-CM

## 2021-06-21 DIAGNOSIS — F41.9 ANXIETY AND DEPRESSION: ICD-10-CM

## 2021-06-21 DIAGNOSIS — F31.76 BIPOLAR DISORDER, IN FULL REMISSION, MOST RECENT EPISODE DEPRESSED (HCC): Primary | ICD-10-CM

## 2021-06-21 DIAGNOSIS — F41.1 GENERALIZED ANXIETY DISORDER: ICD-10-CM

## 2021-06-21 PROCEDURE — 99214 OFFICE O/P EST MOD 30 MIN: CPT | Performed by: NURSE PRACTITIONER

## 2021-06-21 PROCEDURE — 90833 PSYTX W PT W E/M 30 MIN: CPT | Performed by: NURSE PRACTITIONER

## 2021-06-21 RX ORDER — FLUOXETINE HYDROCHLORIDE 40 MG/1
40 CAPSULE ORAL DAILY
Qty: 90 CAPSULE | Refills: 0 | Status: SHIPPED | OUTPATIENT
Start: 2021-06-21 | End: 2021-09-13 | Stop reason: SDUPTHER

## 2021-07-08 ENCOUNTER — OFFICE VISIT (OUTPATIENT)
Dept: BARIATRICS | Facility: CLINIC | Age: 61
End: 2021-07-08
Payer: COMMERCIAL

## 2021-07-08 VITALS
WEIGHT: 141.8 LBS | DIASTOLIC BLOOD PRESSURE: 78 MMHG | HEART RATE: 60 BPM | HEIGHT: 64 IN | BODY MASS INDEX: 24.21 KG/M2 | RESPIRATION RATE: 12 BRPM | SYSTOLIC BLOOD PRESSURE: 120 MMHG | TEMPERATURE: 99.3 F

## 2021-07-08 DIAGNOSIS — F41.9 ANXIETY AND DEPRESSION: ICD-10-CM

## 2021-07-08 DIAGNOSIS — G47.33 OSA (OBSTRUCTIVE SLEEP APNEA): ICD-10-CM

## 2021-07-08 DIAGNOSIS — F32.A ANXIETY AND DEPRESSION: ICD-10-CM

## 2021-07-08 DIAGNOSIS — Z48.815 ENCOUNTER FOR SURGICAL AFTERCARE FOLLOWING SURGERY ON THE DIGESTIVE SYSTEM: Primary | ICD-10-CM

## 2021-07-08 DIAGNOSIS — Z98.84 S/P BARIATRIC SURGERY: ICD-10-CM

## 2021-07-08 DIAGNOSIS — K91.2 POSTSURGICAL MALABSORPTION: ICD-10-CM

## 2021-07-08 DIAGNOSIS — R73.01 IFG (IMPAIRED FASTING GLUCOSE): ICD-10-CM

## 2021-07-08 PROCEDURE — 3008F BODY MASS INDEX DOCD: CPT | Performed by: SURGERY

## 2021-07-08 PROCEDURE — 1036F TOBACCO NON-USER: CPT | Performed by: SURGERY

## 2021-07-08 PROCEDURE — 99213 OFFICE O/P EST LOW 20 MIN: CPT | Performed by: SURGERY

## 2021-07-08 NOTE — PROGRESS NOTES
Progress Note - Bariatric Surgery   Jose Eduardo Borjas 64 y o  female MRN: [id number]  Unit/Bed#:  Encounter: [id number]    Assessment/Plan:  60/F s/p LRYGB 2/17/20 with exceptional weight loss    Encourage mindful eating, stress reduction, sleep hygiene   Limit snacking  Increase exercise   MVI/minerals   Obtain CBC/metabolic panel in 1 yr   RTO in 1 year    Subjective/Objective     Subjective: She is doing very well and is extremely happy about her post op weight loss and co-morbidity resolution  +MVI/minerals  No NSAIDs  ETOH (2 seltzers [employer])  Inadequate protein  Decreased exercise since  broke his wrist  Recent 15 lb weight gain related to snacking  Review of systems: negative except as noted above     Objective:     Blood pressure 120/78, pulse 60, temperature 99 3 °F (37 4 °C), resp  rate 12, height 5' 4" (1 626 m), weight 64 3 kg (141 lb 12 8 oz)  ,Body mass index is 24 34 kg/m²  Invasive Devices     None                 Physical Exam:     No distress   EOMI   CN II-XII grossly intact  Neck ROM wnl   RRR  Breathing non labored   Abd soft/flat, ND  Skin warm/dry  Msk ROM wnl   Thought content/behavior/mood wnl               Lab, Imaging and other studies:I have personally reviewed pertinent lab results

## 2021-07-09 ENCOUNTER — PATIENT MESSAGE (OUTPATIENT)
Dept: BARIATRICS | Facility: CLINIC | Age: 61
End: 2021-07-09

## 2021-09-08 ENCOUNTER — ANNUAL EXAM (OUTPATIENT)
Dept: OBGYN CLINIC | Facility: CLINIC | Age: 61
End: 2021-09-08
Payer: COMMERCIAL

## 2021-09-08 VITALS — BODY MASS INDEX: 25.27 KG/M2 | WEIGHT: 147.2 LBS | DIASTOLIC BLOOD PRESSURE: 76 MMHG | SYSTOLIC BLOOD PRESSURE: 110 MMHG

## 2021-09-08 DIAGNOSIS — N95.1 SYMPTOMATIC MENOPAUSAL OR FEMALE CLIMACTERIC STATES: ICD-10-CM

## 2021-09-08 DIAGNOSIS — R68.82 DECREASED LIBIDO: ICD-10-CM

## 2021-09-08 DIAGNOSIS — Z01.419 WOMEN'S ANNUAL ROUTINE GYNECOLOGICAL EXAMINATION: Primary | ICD-10-CM

## 2021-09-08 PROCEDURE — 99396 PREV VISIT EST AGE 40-64: CPT | Performed by: OBSTETRICS & GYNECOLOGY

## 2021-09-08 RX ORDER — ESTRADIOL 0.5 MG/1
0.5 TABLET ORAL DAILY
Qty: 90 TABLET | Refills: 3 | Status: SHIPPED | OUTPATIENT
Start: 2021-09-08

## 2021-09-08 NOTE — PROGRESS NOTES
ASSESSMENT & PLAN:   Diagnoses and all orders for this visit:    Women's annual routine gynecological examination    Symptomatic menopausal or female climacteric states  Decreased libido  -     conjugated estrogens (PREMARIN) vaginal cream; Insert 0 5 g into the vagina daily  -     estradiol (ESTRACE) 0 5 MG tablet; Take 1 tablet (0 5 mg total) by mouth daily      The following were reviewed in today's visit: Current ASCCP Guidelines, breast self exam, mammography screening ordered, use and side effects of HRT, menopause, exercise and healthy diet  Patient to return to office yearly for annual exam      All questions have been answered to her satisfaction  CC:  Annual Gynecologic Examination    HPI: Justin Ritchie is a 64 y o  [id number] who presents for annual gynecologic examination  She has the following concerns:  None  Health Maintenance:    She exercises 3 days per week  She wears her seatbelt routinely  She does perform self breast exams  Patient tries to follow a balanced diet  Last mammogram: 12/21/2020  Last colonoscopy: 2017 colonoscopy       Past Medical History:   Diagnosis Date    Anxiety     Arthritis     DDD    Bipolar 1 disorder ([facility] [address] )     Colon polyp     Costochondritis     last assessed: 4/4/2016    Depression     HPV (human papilloma virus) infection 12/2017    found on colonoscopy    Kidney stone     Overactive bladder     Postgastrectomy malabsorption     Right kidney mass     Stress     Stress incontinence     Varicella 1970    Wears glasses        Past Surgical History:   Procedure Laterality Date    BARIATRIC SURGERY  2/17/2020    [facility] GASTRIC bypass    BILATERAL OOPHORECTOMY      CERVIX REMOVAL      COLONOSCOPY      COLONOSCOPY N/A 12/27/2017    Procedure: COLONOSCOPY;  Surgeon: Lisa Blanco MD;  Location: [facility]; Service: Gastroenterology    COLPOSCOPY  10/2019    Abnormal cells in colon 3yr ago   [facility]    HYSTERECTOMY partial    LIPOMA RESECTION      back    LIPOSUCTION      lipoma    MYOMECTOMY      Unknown, but at least 15 yrs ago     DIAGNOSTIC ANOSCOPY & BIOPSY N/A 8/30/2018    Procedure: ANOSCOPY HIGH RESOLUTION;  Surgeon: Jeremie Lucio MD;  [facility];  Service: Colorectal     DIAGNOSTIC ANOSCOPY & BIOPSY N/A 4/4/2019    Procedure: ANOSCOPY HIGH RESOLUTION;  Surgeon: Jeremie Lucio MD;  [facility];  Service: Colorectal     DIAGNOSTIC ANOSCOPY & BIOPSY N/A 10/4/2019    Procedure: ANOSCOPY HIGH RESOLUTION;  Surgeon: Jeremie Lucio MD;  [facility];  Service: Colorectal     DIAGNOSTIC ANOSCOPY & BIOPSY N/A 10/2/2020    Procedure: ANOSCOPY HIGH RESOLUTION;  Surgeon: Jeremie Lucio MD;  [facility];  Service: Colorectal    LA DESTRUCTION,ANAL LESION(S),EXTENSIVE N/A 4/12/2018    Procedure: EXCISION CONDYLOMA ANAL/RECTAL;  Surgeon: Jeremie Lucio MD;  [facility];  Service: Colorectal    LA DESTRUCTION,ANAL LESION(S),EXTENSIVE N/A 8/30/2018    Procedure: EXCISION / FULGURATION OF LESIONS;  Surgeon: Jeremie Lucio MD;  [facility];  Service: Colorectal    LA LAP GASTRIC BYPASS/HEIDI-EN-Y N/A 2/17/2020    Procedure: LAPAROSCOPIC HEIDI-EN-Y GASTRIC BYPASS;  Surgeon: Romario Richard MD;  Location: [address] [place] [address];  Service: Bariatrics    LA SURG DIAGNOSTIC EXAM, ANORECTAL N/A 4/12/2018    Procedure: EXAM UNDER ANESTHESIA (EUA); Surgeon: Jeremie Lucio MD;  [facility];  Service: Colorectal    LA SURG DIAGNOSTIC EXAM, ANORECTAL N/A 8/30/2018    Procedure: EXAM UNDER ANESTHESIA (EUA); Surgeon: Jeremie Lucio MD;  [facility];  Service: Colorectal    LA SURG DIAGNOSTIC EXAM, ANORECTAL N/A 4/4/2019    Procedure: EXAM UNDER ANESTHESIA (EUA);   Surgeon: Jeremie Lucio MD;  [facility];  Service: Colorectal    LA SURG DIAGNOSTIC EXAM, ANORECTAL N/A 10/4/2019    Procedure: EXAM UNDER ANESTHESIA (EUA); Surgeon: Keenan Langley MD;  [facility];  Service: Colorectal    MO SURG DIAGNOSTIC EXAM, ANORECTAL N/A 10/2/2020    Procedure: EXAM UNDER ANESTHESIA (EUA); Surgeon: Keenan Langley MD;  [facility];  Service: Colorectal    TONSILLECTOMY      WISDOM TOOTH EXTRACTION      x4       Past OB/Gyn History:   No LMP recorded  Patient has had a hysterectomy  Patient is post menopausal    History of sexually transmitted infection: No  Patient is currently sexually active  Birth control: postmenopausal  Last Pap 2018; s/p hysterectomy, no cervix       Family History   Problem Relation Age of Onset    Other Mother         damage to diaphragm s/p surgery    Rheum arthritis Mother     Stroke Mother 61        mini strokes    Diabetes type II Mother 61   Learta January Bipolar disorder Mother     Depression Mother     Anxiety disorder Mother     Arthritis Mother     Hyperlipidemia Mother         Onset/diagnosed in her 66's    [de-identified] / [place] Mother     Osteoarthritis Mother     Hyperlipidemia Father         Onset unknown    Breast cancer Sister 36    Depression Sister     Anxiety disorder Sister     Mental illness Sister         DID diagnosis    Osteoarthritis Sister     Hypertension Paternal Grandmother 79    Vision loss Paternal Grandmother         ARMD + stroke in one eye    Heart disease Paternal Grandmother     Addiction problem Son     Bipolar disorder Son     Depression Son     Anxiety disorder Son     Drug abuse Son     Substance Abuse Son     Alcohol abuse Son     Deep vein thrombosis Son     Osteoarthritis Maternal Grandmother        Social History:  Social History     Socioeconomic History    Marital status: /Civil Union     Spouse name: Not on file    Number of children: Not on file    Years of education: Not on file    Highest education level: Not on file   Occupational History    Not on file   Tobacco Use    Smoking status: Former Smoker     Packs/day: 0 25     Years: 10 00     Pack years: 2 50     Types: Cigarettes, Cigars     Start date: 7/7/2008     Quit date: 7/7/2018     Years since quitting: 3 1    Smokeless tobacco: Never Used    Tobacco comment: quit 7/8/2018   Vaping Use    Vaping Use: Former    Start date: 1/1/2018   Anh Armendariz Quit date: 7/1/2018   Substance and Sexual Activity    Alcohol use: Yes     Alcohol/week: 2 0 standard drinks     Types: 1 Glasses of wine, 1 Cans of beer per week     Comment: socially    Drug use: No    Sexual activity: Yes     Partners: Male     Birth control/protection: Post-menopausal, Female Sterilization   Other Topics Concern    Not on file   Social History Narrative    Not on file     Social Determinants of Health     Financial Resource Strain:     Difficulty of Paying Living Expenses:    Food Insecurity:     Worried About Running Out of Food in the Last Year:     [address] in the Last Year:    Transportation Needs:     Lack of Transportation (Medical):  Lack of Transportation (Non-Medical):    Physical Activity:     Days of Exercise per Week:     Minutes of Exercise per Session:    Stress:     Feeling of Stress :    Social Connections:     Frequency of Communication with Friends and Family:     Frequency of Social Gatherings with Friends and Family:     Attends Buddhism Services:     Active Member of Clubs or Organizations:     Attends Club or Organization Meetings:     Marital Status:    Intimate Partner Violence:     Fear of Current or Ex-Partner:     Emotionally Abused:     Physically Abused:     Sexually Abused:      Presently lives with   She feels safe at home         Allergies   Allergen Reactions    Sulfa Antibiotics Rash         Current Outpatient Medications:     Calcium 500-100 MG-UNIT CHEW, Chew 3  Tabs daily, Disp: , Rfl:     Coenzyme Q10 (CoQ-10) 100 MG CAPS, Take 1 capsule by mouth daily, Disp: , Rfl:     COLLAGEN PO, Take 1 tablet by mouth daily, Disp: , Rfl:     conjugated estrogens (PREMARIN) vaginal cream, Insert 0 5 g into the vagina daily, Disp: 42 5 g, Rfl: 6    Cyanocobalamin (Vitamin B12) 500 MCG TABS, Take 1 tablet by mouth daily, Disp: , Rfl:     estradiol (ESTRACE) 0 5 MG tablet, Take 1 tablet (0 5 mg total) by mouth daily, Disp: 90 tablet, Rfl: 3    FLUoxetine (PROzac) 40 MG capsule, Take 1 capsule (40 mg total) by mouth daily, Disp: 90 capsule, Rfl: 0    Maya, Zingiber officinalis, (MAYA PO), Take 1 tablet by mouth daily, Disp: , Rfl:     Multiple Vitamins-Minerals (Bariatric Multivitamins/Iron) CAPS, Take 1 capsule by mouth daily, Disp: , Rfl:     Polyethylene Glycol 3350 (MIRALAX PO), Take by mouth daily, Disp: , Rfl:     tolterodine (DETROL LA) 4 mg 24 hr capsule, take 1 capsule by mouth once daily INSTEAD OF MYRBETRIC, Disp: , Rfl:     Saw Palmetto 500 MG CAPS, Take 1 capsule by mouth daily, Disp: , Rfl:     Review of Systems:  Review of Systems   Constitutional: Negative for chills, fever and unexpected weight change  Respiratory: Negative for cough and shortness of breath  Cardiovascular: Negative for chest pain and palpitations  Gastrointestinal: Negative for abdominal distention, abdominal pain, blood in stool, constipation, nausea and vomiting  Genitourinary: Negative for difficulty urinating, dysuria, frequency, pelvic pain, urgency, vaginal bleeding, vaginal discharge and vaginal pain  Neurological: Negative for headaches  Physical Exam:  /76   Wt 66 8 kg (147 lb 3 2 oz)   Breastfeeding No   BMI 25 27 kg/m²    Physical Exam  Constitutional:       General: She is awake  Appearance: Normal appearance  She is well-developed  Genitourinary:      Pelvic exam was performed with patient in the lithotomy position  Vulva, urethra, bladder and vagina normal       No vulval lesion, ulcerations or rash noted  No urethral prolapse present        Bladder is not distended or tender  No vaginal discharge, erythema, tenderness, bleeding, atrophy or prolapse  Cervix is absent  No right or left adnexal mass present  Right adnexa not tender or full  Left adnexa not tender or full  Genitourinary Comments: Absent uterus and cervix  Cuff intact  No palpable masses  No lesions visualized  No bladder tenderness  HENT:      Head: Normocephalic and atraumatic  Cardiovascular:      Rate and Rhythm: Normal rate and regular rhythm  Heart sounds: Normal heart sounds  Pulmonary:      Effort: Pulmonary effort is normal  No tachypnea or respiratory distress  Breath sounds: Normal breath sounds  Chest:      Breasts:         Right: Normal  No inverted nipple, mass, nipple discharge, skin change or tenderness  Left: Normal  No inverted nipple, mass, nipple discharge, skin change or tenderness  Abdominal:      General: There is no distension  Palpations: Abdomen is soft  Tenderness: There is no abdominal tenderness  There is no guarding  Musculoskeletal:      Cervical back: Neck supple  Lymphadenopathy:      Upper Body:      Right upper body: No supraclavicular or axillary adenopathy  Left upper body: No supraclavicular or axillary adenopathy  Neurological:      General: No focal deficit present  Mental Status: She is alert and oriented to person, place, and time  Psychiatric:         Mood and Affect: Mood normal          Behavior: Behavior normal          Thought Content: Thought content normal          Judgment: Judgment normal    Vitals reviewed

## 2021-09-13 ENCOUNTER — OFFICE VISIT (OUTPATIENT)
Dept: PSYCHIATRY | Facility: CLINIC | Age: 61
End: 2021-09-13
Payer: COMMERCIAL

## 2021-09-13 VITALS — BODY MASS INDEX: 24.19 KG/M2 | WEIGHT: 145.2 LBS | HEIGHT: 65 IN

## 2021-09-13 DIAGNOSIS — F41.9 ANXIETY AND DEPRESSION: ICD-10-CM

## 2021-09-13 DIAGNOSIS — F31.76 BIPOLAR DISORDER, IN FULL REMISSION, MOST RECENT EPISODE DEPRESSED (HCC): Primary | ICD-10-CM

## 2021-09-13 DIAGNOSIS — F41.1 GENERALIZED ANXIETY DISORDER: ICD-10-CM

## 2021-09-13 DIAGNOSIS — F32.A ANXIETY AND DEPRESSION: ICD-10-CM

## 2021-09-13 PROCEDURE — 90833 PSYTX W PT W E/M 30 MIN: CPT | Performed by: NURSE PRACTITIONER

## 2021-09-13 PROCEDURE — 1036F TOBACCO NON-USER: CPT | Performed by: NURSE PRACTITIONER

## 2021-09-13 PROCEDURE — 3725F SCREEN DEPRESSION PERFORMED: CPT | Performed by: NURSE PRACTITIONER

## 2021-09-13 PROCEDURE — 3008F BODY MASS INDEX DOCD: CPT | Performed by: NURSE PRACTITIONER

## 2021-09-13 PROCEDURE — 99214 OFFICE O/P EST MOD 30 MIN: CPT | Performed by: NURSE PRACTITIONER

## 2021-09-13 RX ORDER — FLUOXETINE HYDROCHLORIDE 40 MG/1
40 CAPSULE ORAL DAILY
Qty: 90 CAPSULE | Refills: 0 | Status: SHIPPED | OUTPATIENT
Start: 2021-09-13 | End: 2021-12-14 | Stop reason: SDUPTHER

## 2021-09-13 NOTE — PSYCH
PROGRESS NOTE        [address] [facility]      Name and Date of Birth:  Erik Kemp 64 y o  1960    Date of Visit: 09/13/21    This patient was seen in the office today for medication management and psychotherapy  COVID-19 precautions were followed at all times: masks were worn by patient and provider, goggles were worn by provider, social distancing was maintained, hand-washing was performed before and after appointment, pts seat and providers electronic signature device and stylus were cleaned with germicidal disposable wipes according to product instructions, and room was ventilated before pt entered the room and after pt left it  Time spent on psychotherapy: 25 minutes    Treatment modality: medication management; medication education, reinforce adaptive behaviors e g  looking at positive things, being aware of them, letting go of things she cannot control      SUBJECTIVE: PHQ9 score = 0  Pt says that she spends a lot of time in [place], in her [facility] bubble, and has never felt better in her life  Focuses on positive things in her life, has learned to let go of negative thoughts, being judgmental  Her  is still in [facility], and will retire to [facility]  She comes up to [facility] to visit her family, e g  her young grandson who she was with yesterday  Sleeps and eats well     6/20/2021: doing really well, going to [facility] [employer], has a sponsor, doing so much better  No difference between generic and brand Prozac  Uses tools she has learned in [facility] and elsewhere with excellent effect  Going back to [facility] soon       3/29/2021:  Prozac (generic) continues to feel remission of depressive symptoms, and of anxiety; moods are stable  She is still in [place], will be driving up to [place], and then [facility]  [place]  and bringing her [employer] with her  Continues to attend [facility], is helping tremendously, including spiritually   Attending [employer], has her first sponsee  Intends eventually to move permanently to [place], then come to visit family up here  Has not received COVID vaccine yet but plans to  Feels so much healthier than a year ago, she got a gastric bypass last February, has been eating healthier, not trying to fix her family - living far from them has been very helpful         She denies suicidal ideation, intent or plan at present, has no suicidal ideation, intent or plan at present  She denies any auditory hallucinations and visual hallucinations, denies any other delusional thinking, denies any delusional thinking  She denies any side effects from medications    HPI ROS Appetite Changes and Sleep: normal appetite, normal sleep    Review Of Systems:      Constitutional Negative   ENT Negative   Cardiovascular Negative   Respiratory Negative   Gastrointestinal Negative   Genitourinary Negative   Musculoskeletal Negative   Integumentary Negative   Neurological Negative   Endocrine Negative   Other Symptoms Negative and None       Laboratory Results: No results found for this or any previous visit      Substance Abuse History:    Social History     Substance and Sexual Activity   Drug Use No       Family Psychiatric History:     Family History   Problem Relation Age of Onset    Other Mother         damage to diaphragm s/p surgery    Rheum arthritis Mother     Stroke Mother 61        mini strokes    Diabetes type II Mother 61   Reesa Matthews Bipolar disorder Mother     Depression Mother     Anxiety disorder Mother     Arthritis Mother     Hyperlipidemia Mother         Onset/diagnosed in her 66's    [de-identified] / [place] Mother     Osteoarthritis Mother     Hyperlipidemia Father         Onset unknown    Breast cancer Sister 36    Depression Sister     Anxiety disorder Sister     Mental illness Sister         DID diagnosis    Osteoarthritis Sister     Hypertension Paternal Grandmother 79    Vision loss Paternal Grandmother         ARMD + stroke in one eye    Heart disease Paternal Grandmother     Addiction problem Son     Bipolar disorder Son     Depression Son     Anxiety disorder Son    Xenia Sloan Drug abuse Son     Substance Abuse Son     Alcohol abuse Son     Deep vein thrombosis Son     Osteoarthritis Maternal Grandmother        The following portions of the patient's history were reviewed and updated as appropriate: past family history, past medical history, past social history, past surgical history and problem list     Social History     Socioeconomic History    Marital status: /Civil Union     Spouse name: Not on file    Number of children: Not on file    Years of education: Not on file    Highest education level: Not on file   Occupational History    Not on file   Tobacco Use    Smoking status: Former Smoker     Packs/day: 0 25     Years: 10 00     Pack years: 2 50     Types: Cigarettes, Cigars     Start date: 7/7/2008     Quit date: 7/7/2018     Years since quitting: 3 1    Smokeless tobacco: Never Used    Tobacco comment: quit 7/8/2018   Vaping Use    Vaping Use: Former    Start date: 1/1/2018    Quit date: 7/1/2018   Substance and Sexual Activity    Alcohol use: Yes     Alcohol/week: 2 0 standard drinks     Types: 1 Glasses of wine, 1 Cans of beer per week     Comment: socially    Drug use: No    Sexual activity: Yes     Partners: Male     Birth control/protection: Post-menopausal, Female Sterilization   Other Topics Concern    Not on file   Social History Narrative    Not on file     Social Determinants of Health     Financial Resource Strain:     Difficulty of Paying Living Expenses:    Food Insecurity:     Worried About Running Out of Food in the Last Year:     [address] in the Last Year:    Transportation Needs:     Lack of Transportation (Medical):      Lack of Transportation (Non-Medical):    Physical Activity:     Days of Exercise per Week:     Minutes of Exercise per Session:    Stress:     Feeling of Stress : Social Connections:     Frequency of Communication with Friends and Family:     Frequency of Social Gatherings with Friends and Family:     Attends [employer] Services:     Active Member of Clubs or Organizations:     Attends Club or Organization Meetings:     Marital Status:    Intimate Partner Violence:     Fear of Current or Ex-Partner:     Emotionally Abused:     Physically Abused:     Sexually Abused:      Social History     Social History Narrative    Not on file        Social History     Tobacco History     Smoking Status  Former Smoker Smoking Start Date  7/7/2008 Quit date  7/7/2018 Smoking Frequency  0 25 packs/day for 10 years (2 5 pk yrs)    Smoking Tobacco Type  Cigarettes, Cigars    Smokeless Tobacco Use  Never Used    Tobacco Comment  quit 7/8/2018          Alcohol History     Alcohol Use Status  Yes Drinks/Week  1 Glasses of wine, 1 Cans of beer, 0 Shots of liquor, 0 Standard drinks or equivalent per week Amount  2 0 standard drinks of alcohol/wk Comment  socially          Drug Use     Drug Use Status  No          Sexual Activity     Sexually Active  Yes Partners  Male Birth Control/Protection  Post-menopausal, Female Sterilization          Activities of Daily Living    Not Asked                     OBJECTIVE:     Mental Status Evaluation:    Appearance age appropriate, casually dressed   Behavior pleasant, cooperative   Speech normal volume, normal pitch   Mood Happy and joyful   Affect Full and appropriate   Thought Processes Coherent, organized, goal-directed   Associations intact associations   Thought Content normal   Perceptual Disturbances: none   Abnormal Thoughts  Risk Potential Suicidal ideation - None  Homicidal ideation - None  Potential for aggression - No   Orientation oriented to person, place, time/date and situation   Memory recent and remote memory grossly intact   Consciousness alert and awake   Attention Span attention span and concentration are age appropriate Intellect Not formally assessed   Insight age appropriate    Judgement good    Muscle Strength and  Gait muscle strength and tone were normal   Language no difficulty naming common objects   Fund of Knowledge displays adequate knowledge of current events   Pain none   Pain Scale 0     The patient's chart was reviewed for relevant lab reports and recent encounters with other providers  Medications, treatment progress and treatment plan were reviewed with the patient  The treatment plan was updated with the patient who agreed with the updated plan  Assessment/Plan:       Diagnoses and all orders for this visit:    Bipolar disorder, in full remission, most recent episode depressed (HCC)  -     FLUoxetine (PROzac) 40 MG capsule; Take 1 capsule (40 mg total) by mouth daily    Generalized anxiety disorder  -     FLUoxetine (PROzac) 40 MG capsule;  Take 1 capsule (40 mg total) by mouth daily    Anxiety and depression          Treatment Recommendations/Precautions:    Continue current medications:              Fluoxetine 40 mg capsule - take 1 capsule by mouth every day     Risks/Benefits       Risks, Benefits And Possible Side Effects Of Medications:     Risks, benefits, and possible side effects of medications explained to patient and patient verbalizes understanding and agreement for treatment      Controlled Medication Discussion:      Not applicable

## 2021-09-13 NOTE — BH TREATMENT PLAN
TREATMENT PLAN         [place]    Name and Date of Birth:  Justin Ritchie 64 y o  1960    Date of Treatment Plan: September 13, 2021    Diagnosis/Diagnoses:    1  Bipolar disorder, in full remission, most recent episode depressed ([address] )    2  Generalized anxiety disorder         Strengths/Personal Resources for Self-Care: taking medications as prescribed, ability to communicate well, motivation for treatment      Area/Areas of need (in own words): anxiety symptoms, depressive symptoms  1          Long Term Goal: maintain control of depression and anxiety  Target date - 3/15/2022  Person/Persons responsible for completion of goal: josselin Villa     2          Short Term Objective (s) - How will we reach this goal?:   A  Provider new recommended medication/dosage changes and/or continue medication(s):Prozac  B  take medication as discussed/prescribed, attend scheduled appts  C  N/A  Target date - 3/15/2022  Person/Persons Responsible for Completion of Goal: josselin Villa     Progress Towards Goals: stable     Treatment Modality: medication management every 12 weeks     Review due 120 - 180 days from date of this plan: 3/15/2022  Expected length of service: maintenance  My Physician/PA/NP and I have developed this plan together and I agree to work on the goals and objectives   I understand the treatment goals that were developed for my treatment

## 2021-09-13 NOTE — PATIENT INSTRUCTIONS
Continue current medications:              Fluoxetine 40 mg capsule - take 1 capsule by mouth every day

## 2021-09-14 ENCOUNTER — TELEPHONE (OUTPATIENT)
Dept: OBGYN CLINIC | Facility: CLINIC | Age: 61
End: 2021-09-14

## 2021-09-14 NOTE — TELEPHONE ENCOUNTER
Received fax from [facility] for prescription clarification  Patients Premarin was prescribed to insert vaginally daily  Recommended dosing is applying 2 times per week  Per Dr Rita Meza, patient should inserted 2 times per week  Form filled out and faxed to [facility]

## 2021-10-15 ENCOUNTER — TELEPHONE (OUTPATIENT)
Dept: FAMILY MEDICINE CLINIC | Facility: CLINIC | Age: 61
End: 2021-10-15

## 2021-10-15 ENCOUNTER — HOSPITAL ENCOUNTER (OUTPATIENT)
Dept: RADIOLOGY | Facility: HOSPITAL | Age: 61
Discharge: HOME | End: 2021-10-15
Payer: COMMERCIAL

## 2021-10-15 DIAGNOSIS — D30.00 BENIGN NEOPLASM OF UNSPECIFIED KIDNEY: ICD-10-CM

## 2021-10-15 PROCEDURE — 76770 US EXAM ABDO BACK WALL COMP: CPT

## 2021-10-23 LAB
25(OH)D3+25(OH)D2 SERPL-MCNC: 49.3 NG/ML (ref 30–100)
ALBUMIN SERPL-MCNC: 4.2 G/DL (ref 3.8–4.9)
ALBUMIN/GLOB SERPL: 1.9 {RATIO} (ref 1.2–2.2)
ALP SERPL-CCNC: 53 IU/L (ref 44–121)
ALT SERPL-CCNC: 17 IU/L (ref 0–32)
AST SERPL-CCNC: 21 IU/L (ref 0–40)
BASOPHILS # BLD AUTO: 0 X10E3/UL (ref 0–0.2)
BASOPHILS NFR BLD AUTO: 0 %
BILIRUB SERPL-MCNC: 0.3 MG/DL (ref 0–1.2)
BUN SERPL-MCNC: 9 MG/DL (ref 6–24)
BUN/CREAT SERPL: 13 (ref 9–23)
CALCIUM SERPL-MCNC: 9.4 MG/DL (ref 8.7–10.2)
CHLORIDE SERPL-SCNC: 101 MMOL/L (ref 96–106)
CHOLEST SERPL-MCNC: 171 MG/DL (ref 100–199)
CO2 SERPL-SCNC: 26 MMOL/L (ref 20–29)
COPPER SERPL-MCNC: 149 UG/DL (ref 80–158)
CREAT SERPL-MCNC: 0.7 MG/DL (ref 0.57–1)
EOSINOPHIL # BLD AUTO: 0.1 X10E3/UL (ref 0–0.4)
EOSINOPHIL NFR BLD AUTO: 3 %
ERYTHROCYTE [DISTWIDTH] IN BLOOD BY AUTOMATED COUNT: 12.6 % (ref 11.7–15.4)
FERRITIN SERPL-MCNC: 245 NG/ML (ref 15–150)
FOLATE SERPL-MCNC: 19.5 NG/ML
GLOBULIN SER-MCNC: 2.2 G/DL (ref 1.5–4.5)
GLUCOSE SERPL-MCNC: 86 MG/DL (ref 65–99)
HBA1C MFR BLD: 5.3 % (ref 4.8–5.6)
HCT VFR BLD AUTO: 43.2 % (ref 34–46.6)
HDLC SERPL-MCNC: 70 MG/DL
HGB BLD-MCNC: 14.5 G/DL (ref 11.1–15.9)
IMM GRANULOCYTES # BLD: 0 X10E3/UL (ref 0–0.1)
IMM GRANULOCYTES NFR BLD: 0 %
IRON SATN MFR SERPL: 23 % (ref 15–55)
IRON SERPL-MCNC: 74 UG/DL (ref 27–159)
LDLC SERPL CALC-MCNC: 86 MG/DL (ref 0–99)
LYMPHOCYTES # BLD AUTO: 1.6 X10E3/UL (ref 0.7–3.1)
LYMPHOCYTES NFR BLD AUTO: 39 %
MCH RBC QN AUTO: 31 PG (ref 26.6–33)
MCHC RBC AUTO-ENTMCNC: 33.6 G/DL (ref 31.5–35.7)
MCV RBC AUTO: 93 FL (ref 79–97)
MONOCYTES # BLD AUTO: 0.3 X10E3/UL (ref 0.1–0.9)
MONOCYTES NFR BLD AUTO: 7 %
NEUTROPHILS # BLD AUTO: 2 X10E3/UL (ref 1.4–7)
NEUTROPHILS NFR BLD AUTO: 51 %
PLATELET # BLD AUTO: 198 X10E3/UL (ref 150–450)
POTASSIUM SERPL-SCNC: 4.5 MMOL/L (ref 3.5–5.2)
PROT SERPL-MCNC: 6.4 G/DL (ref 6–8.5)
PTH-INTACT SERPL-MCNC: 33 PG/ML (ref 15–65)
RBC # BLD AUTO: 4.67 X10E6/UL (ref 3.77–5.28)
SL AMB EGFR AFRICAN AMERICAN: 110 ML/MIN/1.73
SL AMB EGFR NON AFRICAN AMERICAN: 96 ML/MIN/1.73
SL AMB VLDL CHOLESTEROL CALC: 15 MG/DL (ref 5–40)
SODIUM SERPL-SCNC: 140 MMOL/L (ref 134–144)
TIBC SERPL-MCNC: 318 UG/DL (ref 250–450)
TRIGL SERPL-MCNC: 79 MG/DL (ref 0–149)
UIBC SERPL-MCNC: 244 UG/DL (ref 131–425)
VIT A SERPL-MCNC: 36.6 UG/DL (ref 20.1–62)
VIT B1 BLD-SCNC: 146.6 NMOL/L (ref 66.5–200)
VIT B12 SERPL-MCNC: 1084 PG/ML (ref 232–1245)
WBC # BLD AUTO: 4 X10E3/UL (ref 3.4–10.8)
ZINC SERPL-MCNC: 100 UG/DL (ref 44–115)

## 2021-12-14 ENCOUNTER — OFFICE VISIT (OUTPATIENT)
Dept: PSYCHIATRY | Facility: CLINIC | Age: 61
End: 2021-12-14
Payer: COMMERCIAL

## 2021-12-14 VITALS — BODY MASS INDEX: 24.62 KG/M2 | HEIGHT: 65 IN | WEIGHT: 147.8 LBS

## 2021-12-14 DIAGNOSIS — F31.76 BIPOLAR DISORDER, IN FULL REMISSION, MOST RECENT EPISODE DEPRESSED (HCC): Primary | ICD-10-CM

## 2021-12-14 DIAGNOSIS — F41.1 GENERALIZED ANXIETY DISORDER: ICD-10-CM

## 2021-12-14 PROCEDURE — 99214 OFFICE O/P EST MOD 30 MIN: CPT | Performed by: NURSE PRACTITIONER

## 2021-12-14 RX ORDER — FLUOXETINE HYDROCHLORIDE 40 MG/1
40 CAPSULE ORAL DAILY
Qty: 30 CAPSULE | Refills: 0 | Status: SHIPPED | OUTPATIENT
Start: 2021-12-14 | End: 2022-01-12

## 2021-12-14 RX ORDER — FLUOXETINE HYDROCHLORIDE 40 MG/1
40 CAPSULE ORAL DAILY
Qty: 90 CAPSULE | Refills: 0 | Status: CANCELLED | OUTPATIENT
Start: 2021-12-14

## 2022-02-15 ENCOUNTER — OFFICE VISIT (OUTPATIENT)
Dept: PSYCHIATRY | Facility: CLINIC | Age: 62
End: 2022-02-15
Payer: COMMERCIAL

## 2022-02-15 VITALS — HEIGHT: 65 IN | BODY MASS INDEX: 25.59 KG/M2 | WEIGHT: 153.6 LBS

## 2022-02-15 DIAGNOSIS — F31.76 BIPOLAR DISORDER, IN FULL REMISSION, MOST RECENT EPISODE DEPRESSED (HCC): Primary | ICD-10-CM

## 2022-02-15 DIAGNOSIS — F41.1 GENERALIZED ANXIETY DISORDER: ICD-10-CM

## 2022-02-15 PROCEDURE — 1036F TOBACCO NON-USER: CPT | Performed by: NURSE PRACTITIONER

## 2022-02-15 PROCEDURE — 3008F BODY MASS INDEX DOCD: CPT | Performed by: NURSE PRACTITIONER

## 2022-02-15 PROCEDURE — 99213 OFFICE O/P EST LOW 20 MIN: CPT | Performed by: NURSE PRACTITIONER
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
## 2022-02-15 NOTE — BH TREATMENT PLAN
TREATMENT PLAN         [facility]    Name and Date of Birth:  Barnabas Peabody 63 y o  1960    Date of Treatment Plan: February 15, 2022    Diagnosis/Diagnoses:    1  Bipolar disorder, in full remission, most recent episode depressed ([address] )    2  Generalized anxiety disorder         Strengths/Personal Resources for Self-Care: taking medications as prescribed, ability to communicate well, motivation for treatment      Area/Areas of need (in own words): anxiety symptoms, depressive symptoms  1          Long Term Goal: maintain control of depression and anxiety  Target date - 8/15/2022  Person/Persons responsible for completion of goal: josselin Villa     2          Short Term Objective (s) - How will we reach this goal?:   A  Provider new recommended medication/dosage changes and/or continue medication(s):Prozac  B  take medication as discussed/prescribed, attend scheduled appts  Target date - 8/15/2022  Person/Persons Responsible for Completion of Goal: josselin Villa     Progress Towards Goals: stable     Treatment Modality: medication management every 12 weeks     Review due 120 - 180 days from date of this plan: 8/15/2022  Expected length of service: maintenance  My Physician/PA/NP and I have developed this plan together and I agree to work on the goals and objectives  I understand the treatment goals that were developed for my treatment

## 2022-04-25 ENCOUNTER — OFFICE VISIT (OUTPATIENT)
Dept: PSYCHIATRY | Facility: CLINIC | Age: 62
End: 2022-04-25
Payer: COMMERCIAL

## 2022-04-25 VITALS — HEIGHT: 65 IN | WEIGHT: 153.8 LBS | BODY MASS INDEX: 25.62 KG/M2

## 2022-04-25 DIAGNOSIS — F31.76 BIPOLAR DISORDER, IN FULL REMISSION, MOST RECENT EPISODE DEPRESSED (HCC): Primary | ICD-10-CM

## 2022-04-25 DIAGNOSIS — F41.1 GENERALIZED ANXIETY DISORDER: ICD-10-CM

## 2022-04-25 PROCEDURE — 1036F TOBACCO NON-USER: CPT | Performed by: NURSE PRACTITIONER

## 2022-04-25 PROCEDURE — 99214 OFFICE O/P EST MOD 30 MIN: CPT | Performed by: NURSE PRACTITIONER

## 2022-04-25 PROCEDURE — 90833 PSYTX W PT W E/M 30 MIN: CPT | Performed by: NURSE PRACTITIONER

## 2022-04-25 PROCEDURE — 3008F BODY MASS INDEX DOCD: CPT | Performed by: NURSE PRACTITIONER

## 2022-04-25 RX ORDER — FLUOXETINE HYDROCHLORIDE 40 MG/1
40 CAPSULE ORAL DAILY
Qty: 90 CAPSULE | Refills: 1 | Status: SHIPPED | OUTPATIENT
Start: 2022-04-25

## 2022-04-25 NOTE — PSYCH
This note was not shared with the patient due to privacy exception: note includes other individuals    Katherine      Name and Date of Birth:  Alvaro Roa 63 y o  1960    Date of Visit: 04/25/22      Alvaro Roa was seen today for medication management and brief psychotherapy for depression, anxiety      Office Regular Visit    Throughout this appointment, COVID-19 precautions were followed at all times: masks were worn by this patient and the provider, social distancing was maintained, hand-washing was performed before and after appointment, and the provider's room was ventilated before this patient entered the room and after this patient left it  Time spent on psychotherapy:  20 minutes    Treatment modality:   Medication management   Medication education  Supportive psychotherapy  Encouraged and reinforced adaptive behavior          SUBJECTIVE: taking medications as prescribed, denies side effects  Her medication is working, depression and anxiety are controlled   Moving to [place] permanently in two weeks  Excited, optimistic, every day is an adventure  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, 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 here she and Soraya Osullivan flipped      2/15/2022: says she is doing very well, taking medication as prescribed, it continues to work well, she is not depressed at all, moods are stable, sleeps well, appetite appropriate  Came up here to visit Soraya Osullivan and have a broken tooth fixed  Is very happy in [facility]  Will be transitioning to providers in [facility], has not found a psychiatric one yet  Enjoying flipping houses  Planning to buy another conure  Continue current medications:              Fluoxetine 40 mg capsule - take 1 capsule by mouth every day         She denies suicidal ideation, intent or plan at present, has no suicidal ideation, intent or plan at present      She denies any auditory hallucinations and visual hallucinations, denies any other delusional thinking, denies any delusional thinking  She denies any side effects from medications      HPI ROS Appetite Changes and Sleep:   Appetite - normal    Sleep - normal    Review Of Systems:      Constitutional Negative   ENT Negative   Cardiovascular Negative   Respiratory Negative   Gastrointestinal Negative   Genitourinary Negative   Musculoskeletal Negative   Integumentary Negative   Neurological Negative   Endocrine Negative   Other Symptoms Negative and None       Laboratory Results: No results found for this or any previous visit      Substance Abuse History:    Social History     Substance and Sexual Activity   Drug Use No       Family Psychiatric History:     Family History   Problem Relation Age of Onset    Other Mother         damage to diaphragm s/p surgery    Rheum arthritis Mother     Stroke Mother 61        mini strokes    Diabetes type II Mother 61   Beau Larue Bipolar disorder Mother     Depression Mother     Anxiety disorder Mother     Arthritis Mother     Hyperlipidemia Mother         Onset/diagnosed in her 66's    Kristina Maker / [place] Mother     Osteoarthritis Mother     Hyperlipidemia Father         Onset unknown    Breast cancer Sister 36    Depression Sister     Anxiety disorder Sister     Mental illness Sister         DID diagnosis    Osteoarthritis Sister     Hypertension Paternal Grandmother 79    Vision loss Paternal Grandmother         ARMD + stroke in one eye    Heart disease Paternal Grandmother     Addiction problem Son     Bipolar disorder Son     Depression Son     Anxiety disorder Son     Drug abuse Son     Substance Abuse Son     Alcohol abuse Son     Deep vein thrombosis Son     Osteoarthritis Maternal Grandmother        The following portions of the patient's history were reviewed and updated as appropriate: past family history, past medical history, past social history, past surgical history and problem list     Social History     Socioeconomic History    Marital status: /Civil Union     Spouse name: Not on file    Number of children: Not on file    Years of education: Not on file    Highest education level: Not on file   Occupational History    Not on file   Tobacco Use    Smoking status: Former Smoker     Packs/day: 0 25     Years: 10 00     Pack years: 2 50     Types: Cigarettes, Cigars     Start date: 7/7/2008     Quit date: 7/7/2018     Years since quitting: 3 8    Smokeless tobacco: Never Used    Tobacco comment: quit 7/8/2018   Vaping Use    Vaping Use: Former    Start date: 1/1/2018    Quit date: 7/1/2018   Substance and Sexual Activity    Alcohol use:  Yes     Alcohol/week: 2 0 standard drinks     Types: 1 Glasses of wine, 1 Cans of beer per week     Comment: socially    Drug use: No    Sexual activity: Yes     Partners: Male     Birth control/protection: Post-menopausal, Female Sterilization   Other Topics Concern    Not on file   Social History Narrative    Not on file     Social Determinants of Health     Financial Resource Strain: Not on file   Food Insecurity: Not on file   Transportation Needs: Not on file   Physical Activity: Not on file   Stress: Not on file   Social Connections: Not on file   Intimate Partner Violence: Not on file   Housing Stability: Not on file     Social History     Social History Narrative    Not on file        Social History     Tobacco History     Smoking Status  Former Smoker Smoking Start Date  7/7/2008 Quit date  7/7/2018 Smoking Frequency  0 25 packs/day for 10 years (2 5 pk yrs)    Smoking Tobacco Type  Cigarettes, Cigars    Smokeless Tobacco Use  Never Used    Tobacco Comment  quit 7/8/2018          Alcohol History     Alcohol Use Status  Yes Drinks/Week  1 Glasses of wine, 1 Cans of beer, 0 Shots of liquor, 0 Standard drinks or equivalent per week Amount  2 0 standard drinks of alcohol/wk Comment  socially          Drug Use     Drug Use Status  No          Sexual Activity     Sexually Active  Yes Partners  Male Birth Control/Protection  Post-menopausal, Female Sterilization          Activities of Daily Living    Not Asked                     OBJECTIVE:     Mental Status Evaluation:    Appearance age appropriate, casually dressed   Behavior pleasant, cooperative   Speech normal rate, rhythm, volume   Mood "excited, optimistic"   Affect Mood-congruent, full   Thought Processes Coherent, organized, goal-directed   Associations intact associations   Thought Content normal   Perceptual Disturbances: none   Abnormal Thoughts  Risk Potential Suicidal ideation - None  Homicidal ideation - None  Potential for aggression - No   Orientation oriented to person, place, date and situation   Memory recent and remote memory grossly intact   Consciousness alert and awake   Attention Span attention span and concentration are age appropriate   Intellect Not formally assessed   Insight intact   Judgement intact    Muscle Strength and  Gait muscle strength and tone were normal, gait normal   Language no difficulty naming common objects   Fund of Knowledge displays adequate knowledge of current events               The patient's chart was reviewed for relevant lab reports and recent encounters with other providers  Medications, treatment progress, and treatment plan enacted on 2/15/2022 were reviewed with the patient  Current treatment plan is due for review/update on NLT 9/15/2022    Treatment plan goals discussed in this encounter: maintain control of depression and anxiety        Assessment/Plan:       Diagnoses and all orders for this visit:    Bipolar disorder, in full remission, most recent episode depressed (HCC)  -     FLUoxetine (PROzac) 40 MG capsule; Take 1 capsule (40 mg total) by mouth daily    Generalized anxiety disorder  -     FLUoxetine (PROzac) 40 MG capsule;  Take 1 capsule (40 mg total) by mouth daily          Treatment Recommendations/Precautions:    Continue current medications:              Fluoxetine 40 mg capsule - take 1 capsule by mouth every day     Risks/Benefits       Risks, Benefits And Possible Side Effects Of Medications:     Risks, benefits, and possible side effects of medications explained to patient and patient verbalizes understanding and agreement for treatment      Controlled Medication Discussion:      Not applicable

## 2022-05-31 ENCOUNTER — TELEPHONE (OUTPATIENT)
Dept: FAMILY MEDICINE CLINIC | Facility: CLINIC | Age: 62
End: 2022-05-31

## 2022-05-31 NOTE — TELEPHONE ENCOUNTER
Called and spoke to Joann  She reports having cold like symptoms but overall thinks she's okay, post positive COVID home test  She and her  are currently in [place] for vacation  I advised Claude Duane should go to a local urgent care clinic for evaluation as we are unable to offer an appt since she is out of the state  Also advised her she would need to be seen in order to get a prescription for treatment she understood and was thankful  Sending to Dr Allyson Davila as Jeremy Grace MA

## 2022-05-31 NOTE — TELEPHONE ENCOUNTER
----- Message from [employer]  Maya Samir sent at 5/28/2022  2:00 PM EDT -----  Regarding: Positive Covid 19 Antigen Rapid test Result  Hi, I am fully vaccinated and double boosted (Moderna 4th shot 5/20) and started to feel like I was catching a cold  I have been careful and have not had covid 19 but something told me to do an antigen rapid test  It was positive    I understand there is a pill that can be prescribed to shorten the duration and severity of the virus  Can you please call it in to [facility] [address] in [place]? [phone] and/or advise  My ,  Jose Lundy, is taking a rapid test now  I will share his results in a future communication  Thanks

## 2022-11-29 ENCOUNTER — HOSPITAL ENCOUNTER (OUTPATIENT)
Dept: ULTRASOUND IMAGING | Facility: HOSPITAL | Age: 62
Discharge: HOME | End: 2022-11-29
Attending: UROLOGY

## 2022-11-29 DIAGNOSIS — R30.0 DYSURIA: ICD-10-CM

## 2023-03-16 ENCOUNTER — OFFICE VISIT (OUTPATIENT)
Dept: URGENT CARE | Age: 63
End: 2023-03-16

## 2023-03-16 VITALS
OXYGEN SATURATION: 98 % | TEMPERATURE: 98.5 F | HEART RATE: 70 BPM | BODY MASS INDEX: 24.33 KG/M2 | RESPIRATION RATE: 18 BRPM | SYSTOLIC BLOOD PRESSURE: 129 MMHG | HEIGHT: 67 IN | WEIGHT: 155 LBS | DIASTOLIC BLOOD PRESSURE: 80 MMHG

## 2023-03-16 DIAGNOSIS — H92.02 LEFT EAR PAIN: ICD-10-CM

## 2023-03-16 DIAGNOSIS — J02.9 SORE THROAT: Primary | ICD-10-CM

## 2023-03-16 LAB — S PYO AG THROAT QL: NEGATIVE

## 2023-03-16 NOTE — PROGRESS NOTES
[facility]        NAME: Hudson Sosa is a 63 y o  female  : 1960    MRN: [id number]  DATE: 2023  TIME: 9:25 AM    Assessment and Plan   Sore throat [J02 9]  1  Sore throat  POCT rapid strepA      2  Left ear pain  neomycin-polymyxin-hydrocortisone (CORTISPORIN) otic solution            Patient Instructions     Rapid strep negative  Pt concerned with left ear pressure & pain - flying back to [place] this afternoon  May start Cortisporin gtt as directed  Continue OTC meds & supportive care for viral pharyngitis  Follow up with PCP in 2-3 days  Proceed to ER if symptoms worsen  Chief Complaint     Chief Complaint   Patient presents with   • Cold Like Symptoms     Flew last week from [place] to [place] and due to fly out today, patient complains of sore throat, ear pain on the left side  Has attempted Sudafed and Tylenol  Chills and bodyaches  Did at home covid test with negative results         History of Present Illness     63 y o  F  Sore throat, ear pain, chills & body aches x  Denies CP or SOB  No fevers  Sudafed & Tylenol OTC  +recent travel to [address] [place] [address] testing negative    Review of Systems   Review of Systems   Constitutional: Positive for chills  Negative for fatigue and fever  HENT: Positive for ear pain and sore throat  Negative for congestion, facial swelling, hearing loss, rhinorrhea, sinus pressure, sneezing and trouble swallowing  Eyes: Negative for pain, redness and visual disturbance  Respiratory: Negative for cough, chest tightness, shortness of breath and wheezing  Cardiovascular: Negative for chest pain and palpitations  Gastrointestinal: Negative for abdominal pain, diarrhea, nausea and vomiting  Genitourinary: Negative for dysuria, flank pain, hematuria and pelvic pain  Musculoskeletal: Positive for myalgias  Negative for arthralgias and back pain  Skin: Negative for color change and rash     Neurological: Negative for dizziness, seizures, syncope, weakness, light-headedness and headaches  Psychiatric/Behavioral: Negative for confusion, hallucinations and sleep disturbance  The patient is not nervous/anxious  All other systems reviewed and are negative          Current Medications       Current Outpatient Medications:   •  Calcium 500-100 MG-UNIT CHEW, Chew 3  Tabs daily, Disp: , Rfl:   •  Coenzyme Q10 (CoQ-10) 100 MG CAPS, Take 1 capsule by mouth daily, Disp: , Rfl:   •  COLLAGEN PO, Take 1 tablet by mouth daily, Disp: , Rfl:   •  conjugated estrogens (PREMARIN) vaginal cream, Insert 0 5 g into the vagina daily, Disp: 42 5 g, Rfl: 11  •  Cyanocobalamin (Vitamin B12) 500 MCG TABS, Take 1 tablet by mouth daily, Disp: , Rfl:   •  estradiol (ESTRACE) 0 5 MG tablet, Take 1 tablet (0 5 mg total) by mouth daily, Disp: 90 tablet, Rfl: 3  •  FLUoxetine (PROzac) 40 MG capsule, Take 1 capsule (40 mg total) by mouth daily, Disp: 90 capsule, Rfl: 1  •  Maya, Zingiber officinalis, (MAYA PO), Take 1 tablet by mouth daily, Disp: , Rfl:   •  Multiple Vitamins-Minerals (Bariatric Multivitamins/Iron) CAPS, Take 1 capsule by mouth daily, Disp: , Rfl:   •  neomycin-polymyxin-hydrocortisone (CORTISPORIN) otic solution, Administer 4 drops into the left ear every 6 (six) hours for 7 days, Disp: 10 mL, Rfl: 0  •  Polyethylene Glycol 3350 (MIRALAX PO), Take by mouth daily, Disp: , Rfl:   •  tolterodine (DETROL LA) 4 mg 24 hr capsule, take 1 capsule by mouth once daily INSTEAD OF MYRBETRIC, Disp: , Rfl:     Current Allergies     Allergies as of 03/16/2023 - Reviewed 03/16/2023   Allergen Reaction Noted   • Sulfa antibiotics Rash 12/21/2017            The following portions of the patient's history were reviewed and updated as appropriate: allergies, current medications, past family history, past medical history, past social history, past surgical history and problem list      Past Medical History:   Diagnosis Date   • Anxiety    • Arthritis     DDD   • [facility]    • Colon polyp    • Costochondritis     last assessed: 4/4/2016   • Depression    • HPV (human papilloma virus) infection 12/2017    found on colonoscopy   • Kidney stone    • Overactive bladder    • Postgastrectomy malabsorption    • Right kidney mass    • Stress    • Stress incontinence    • Varicella 1970   • Wears glasses        Past Surgical History:   Procedure Laterality Date   • BARIATRIC SURGERY  2/17/2020    RNY GASTRIC bypass   • BILATERAL OOPHORECTOMY     • CERVIX REMOVAL     • COLONOSCOPY     • COLONOSCOPY N/A 12/27/2017    Procedure: COLONOSCOPY;  Surgeon: Braden Nguyen MD;  Location: [facility]; Service: Gastroenterology   • COLPOSCOPY  10/2019    Abnormal cells in colon 3yr ago  [facility]   • HYSTERECTOMY      partial   • LIPOMA RESECTION      back   • LIPOSUCTION      lipoma   • MYOMECTOMY      Unknown, but at least 15 yrs ago   • NE ANOSCOPY DX W/HRA &CHEM AGNTS ENHANCEMENT W/BX N/A 8/30/2018    Procedure: ANOSCOPY HIGH RESOLUTION;  Surgeon: Peter Bagley MD;  [facility];  Service: Colorectal   • NE ANOSCOPY DX W/HRA &CHEM AGNTS ENHANCEMENT W/BX N/A 4/4/2019    Procedure: ANOSCOPY HIGH RESOLUTION;  Surgeon: Peter Bagley MD;  [facility];  Service: Colorectal   • NE ANOSCOPY DX W/HRA &CHEM AGNTS ENHANCEMENT W/BX N/A 10/4/2019    Procedure: ANOSCOPY HIGH RESOLUTION;  Surgeon: Peter Bagley MD;  [facility];  Service: Colorectal   • NE ANOSCOPY DX W/HRA &CHEM AGNTS ENHANCEMENT W/BX N/A 10/2/2020    Procedure: ANOSCOPY HIGH RESOLUTION;  Surgeon: Peter Bagley MD;  [facility];  Service: Colorectal   • NE ANRCT XM SURG REQ ANES GENERAL SPI/EDRL DX N/A 4/12/2018    Procedure: EXAM UNDER ANESTHESIA (EUA); Surgeon: Peter Bagley MD;  [facility];  Service: Colorectal   • NE ANRCT XM SURG REQ ANES GENERAL SPI/EDRL DX N/A 8/30/2018    Procedure: EXAM UNDER ANESTHESIA (EUA);   Surgeon: Luca Swan Trip Moya MD;  [facility];  Service: Colorectal   • MT ANRCT XM SURG REQ ANES GENERAL SPI/EDRL DX N/A 4/4/2019    Procedure: EXAM UNDER ANESTHESIA (EUA); Surgeon: Enedelia Tobias MD;  [facility];  Service: Colorectal   • MT ANRCT XM SURG REQ ANES GENERAL SPI/EDRL DX N/A 10/4/2019    Procedure: EXAM UNDER ANESTHESIA (EUA); Surgeon: Enedelia Tobias MD;  [facility];  Service: Colorectal   • MT ANRCT XM SURG REQ ANES GENERAL SPI/EDRL DX N/A 10/2/2020    Procedure: EXAM UNDER ANESTHESIA (EUA);   Surgeon: Enedelia Tobias MD;  [facility];  Service: Colorectal   • MT DSTRJ LESION ANUS EXTENSIVE N/A 4/12/2018    Procedure: EXCISION CONDYLOMA ANAL/RECTAL;  Surgeon: Enedelia Tobias MD;  [facility];  Service: Colorectal   • MT DSTRJ LESION ANUS EXTENSIVE N/A 8/30/2018    Procedure: EXCISION / FULGURATION OF LESIONS;  Surgeon: Enedelia Tobias MD;  [facility];  Service: Colorectal   • MT LAPS GSTR RSTCV [address] W/BYP HEIDI-EN-Y LIMB <150 CM N/A 2/17/2020    Procedure: LAPAROSCOPIC HEIDI-EN-Y GASTRIC BYPASS;  Surgeon: Char Arnold MD;  Location: [facility];  Service: Bariatrics   • TONSILLECTOMY     • WISDOM TOOTH EXTRACTION      x4       Family History   Problem Relation Age of Onset   • Other Mother         damage to diaphragm s/p surgery   • Rheum arthritis Mother    • Stroke Mother 61        mini strokes   • Diabetes type II Mother 61   • Bipolar disorder Mother    • Depression Mother    • Anxiety disorder Mother    • Arthritis Mother    • Hyperlipidemia Mother         Onset/diagnosed in her 66's   • [de-identified] / [place] Mother    • Osteoarthritis Mother    • Hyperlipidemia Father         Onset unknown   • Breast cancer Sister 36   • Depression Sister    • Anxiety disorder Sister    • Mental illness Sister         DID diagnosis   • Osteoarthritis Sister    • Hypertension Paternal Grandmother 79   • Vision loss Paternal Grandmother ARMD + stroke in one eye   • Heart disease Paternal Grandmother    • Addiction problem Son    • Bipolar disorder Son    • Depression Son    • Anxiety disorder Son    • Drug abuse Son    • Substance Abuse Son    • Alcohol abuse Son    • Deep vein thrombosis Son    • Osteoarthritis Maternal Grandmother          Medications have been verified  Objective   /80 (BP Location: Right arm, Patient Position: Sitting)   Pulse 70   Temp 98 5 °F (36 9 °C) (Tympanic)   Resp 18   Ht 5' 7" (1 702 m)   Wt 70 3 kg (155 lb)   SpO2 98%   BMI 24 28 kg/m²   No LMP recorded  Patient has had a hysterectomy  Physical Exam     Physical Exam  Vitals reviewed  Constitutional:       General: She is not in acute distress  Appearance: Normal appearance  She is not toxic-appearing  HENT:      Head: Normocephalic  Right Ear: Tympanic membrane normal  Tympanic membrane is not erythematous or bulging  Left Ear: Tenderness present  Tympanic membrane is injected  Tympanic membrane is not erythematous or bulging  Nose: No congestion or rhinorrhea  Right Sinus: No maxillary sinus tenderness or frontal sinus tenderness  Left Sinus: No maxillary sinus tenderness or frontal sinus tenderness  Mouth/Throat:      Pharynx: Oropharynx is clear  Uvula midline  No oropharyngeal exudate, posterior oropharyngeal erythema or uvula swelling  Tonsils: No tonsillar exudate or tonsillar abscesses  Comments:   +PND - clear drainage  No erythema, swelling or exudate  +tonsillectomy  Eyes:      Extraocular Movements: Extraocular movements intact  Conjunctiva/sclera: Conjunctivae normal       Pupils: Pupils are equal, round, and reactive to light  Cardiovascular:      Rate and Rhythm: Normal rate and regular rhythm  Pulses: Normal pulses  Heart sounds: Normal heart sounds  Pulmonary:      Effort: Pulmonary effort is normal  No tachypnea or respiratory distress        Breath sounds: Normal breath sounds and air entry  No decreased breath sounds, wheezing, rhonchi or rales  Abdominal:      General: Bowel sounds are normal       Palpations: Abdomen is soft  Tenderness: There is no abdominal tenderness  There is no right CVA tenderness or guarding  Musculoskeletal:         General: Normal range of motion  Cervical back: Normal range of motion  Left lower leg: No edema  Lymphadenopathy:      Cervical: No cervical adenopathy  Skin:     General: Skin is warm and dry  Neurological:      General: No focal deficit present  Mental Status: She is alert  Sensory: Sensation is intact  Motor: Motor function is intact  Coordination: Coordination is intact  Gait: Gait is intact  Deep Tendon Reflexes: Reflexes are normal and symmetric

## 2024-10-30 ENCOUNTER — TELEPHONE (OUTPATIENT)
Dept: FAMILY MEDICINE CLINIC | Facility: CLINIC | Age: 64
End: 2024-10-30

## 2024-10-30 NOTE — TELEPHONE ENCOUNTER
Please call pt, I have not seen her since 2020 and I am labeled as the pt's PCP.  Not much has been done with care gaps./  IF I am her pcp then I need to see her for a full physical with labs I will order ahead of time, If I am not please adjust to proper physician.    Looks like she has moved to [place]

## 2025-03-28 NOTE — PROGRESS NOTES
Virtual Regular Visit      Assessment/Plan:    Problem List Items Addressed This Visit        Digestive    Postsurgical malabsorption     -At risk for malabsorption of vitamins/minerals secondary to malabsorption and restriction of intake from bariatric surgery  -Currently taking adequate postop bariatric surgery vitamin supplementation:  Procare MVI w/ 18mg iron, calcium citrate 500mg TID  -First set of bariatric labs 08/10/20 show:       -B12 (484) low normal - start additional 1,000mcg sublingual B12 daily x2-3 months       -Ferritin very elevated (439) - iron and H/H WNL (liver ultrasound in August WNL)    -Repeat metabolic panel ordered  -Patient received education about the importance of adhering to a lifelong supplementation regimen to avoid vitamin/mineral deficiencies             Respiratory    ADRIANA (obstructive sleep apnea)     -No longer wearing CPAP, feeling well rested, no snoring  -Attempted to f/u with repeat sleep study but insurance wouldn't cover            Other    Anxiety and depression     -Hx  Of bipolar   -Stable, on prozac  -Continue monitor and management with prescribing provider  -Encourage consistent f/u with therapist/counselor         Mixed hyperlipidemia     -On statin  -Lipid panel on 08/10/20 WNL         Encounter for surgical aftercare following surgery of digestive system - Primary     -s/p Cornelio-En-Y Gastric Bypass with Dr Anne Cronin on 02/17/20  Overall doing very well with weight loss  EWL is 112%, which places the patient very ahead of schedule for expected post surgical weight loss  She has achieved a healthy BMI and is maintaining her weight around 130lbs  She feels great and has no complaints, however, she is consuming excessive refined CHO, candy, ETOH, coffee, and diet soda  Lengthy discussion about avoidance of these unhealthy options and empty calories and she agrees to make changes and continue f/u with RD      Initial: 226 3lbs  Current:  130lbs per home scale  EWL: 112%  Lopez: 128 8lbs 6 months postop  Current BMI is Body mass index is 22 31 kg/m²  · Tolerating a regular diet-yes  · Eating at least 60 grams of protein per day-yes  · Following 30/60 minute rule with liquids-yes  · Drinking at least 64 ounces of fluid per day-yes  · Drinking carbonated beverages-yes diet soda, advised her to avoid  · Sufficient exercise-yes, walks 10,000 steps daily when living in [facility]; advised trying HIIT   · Using NSAIDs regularly-no  · Using nicotine-no  · Using alcohol-yes has wine or beer 1-2x/week  Advised about the risks of alcohol s/p bariatric surgery and recommend avoiding all alcohol                    Reason for visit is   Chief Complaint   Patient presents with    Annual Exam     Pt is having annual visit with Naye gomezt   Virtual Regular Visit        Encounter provider Екатерина Mendoza PA-C    Provider located at [address] [place] [address] [facility]  [address] [facility]  [address] [facility] [address] [phone]      Recent Visits  No visits were found meeting these conditions  Showing recent visits within past 7 days and meeting all other requirements     Today's Visits  Date Type Provider Dept   02/17/21 Telemedicine Екатерина Mendoza PA-C [facility]   Showing today's visits and meeting all other requirements     Future Appointments  No visits were found meeting these conditions  Showing future appointments within next 150 days and meeting all other requirements        The patient was identified by name and date of birth  Del Lares was informed that this is a telemedicine visit and that the visit is being conducted through [facility] and patient was informed that this is a secure, HIPAA-compliant platform  She agrees to proceed     My office door was closed  No one else was in the room  She acknowledged consent and understanding of privacy and security of the video platform   The patient has agreed to participate and understands they can discontinue the visit at any time  Patient is aware this is a billable service  Fady Bedoya is a 64 y o  female s/p Cornelio-En-Y Gastric Bypass with Dr Bebo Mckee on 02/17/20  Overall doing very well with weight loss  She has achieved a healthy BMI and is maintaining her weight around 130lbs  She feels great and has no complaints, however, she is consuming excessive refined CHO, candy, ETOH, coffee, and diet soda  She met with the RD in December; she advised healthy nutrient dense options and maintain 16-1700kcals and to avoid unhealthy refined CHO, candy/chocolates, soda, coffee, and ETOH  She will be going back to [facility] next week  HPI     Past Medical History:   Diagnosis Date    Anxiety     Arthritis     DDD    Bipolar 1 disorder ([address] )     Colon polyp     Costochondritis     last assessed: 4/4/2016    CPAP (continuous positive airway pressure) dependence     Depression     HPV (human papilloma virus) infection 12/2017    found on colonoscopy    Kidney stone     ADRIANA (obstructive sleep apnea)     cpap level 10-15    Overactive bladder     Postgastrectomy malabsorption     Right kidney mass     Stress     Stress incontinence     Varicella 1970    Wears glasses        Past Surgical History:   Procedure Laterality Date    BARIATRIC SURGERY  2/17/2020    RNY GASTRIC bypass    BILATERAL OOPHORECTOMY      CERVIX REMOVAL      COLONOSCOPY      COLONOSCOPY N/A 12/27/2017    Procedure: COLONOSCOPY;  Surgeon: Louis Wynne MD;  [facility]; Service: Gastroenterology    COLPOSCOPY  10/2019    Abnormal cells in colon 3yr ago   [facility]    HYSTERECTOMY      partial    LIPOMA RESECTION      back    LIPOSUCTION      lipoma    MYOMECTOMY      Unknown, but at least 15 yrs ago     DIAGNOSTIC ANOSCOPY & BIOPSY N/A 8/30/2018    Procedure: ANOSCOPY HIGH RESOLUTION;  Surgeon: Jasbir Vidal MD;  [facility]; Service: Colorectal    [id number] DIAGNOSTIC ANOSCOPY & BIOPSY N/A 4/4/2019    Procedure: ANOSCOPY HIGH RESOLUTION;  Surgeon: Kate Schwab MD;  [facility];  Service: Colorectal     DIAGNOSTIC ANOSCOPY & BIOPSY N/A 10/4/2019    Procedure: ANOSCOPY HIGH RESOLUTION;  Surgeon: Kate Schwab MD;  [facility];  Service: Colorectal     DIAGNOSTIC ANOSCOPY & BIOPSY N/A 10/2/2020    Procedure: ANOSCOPY HIGH RESOLUTION;  Surgeon: Kate Schwab MD;  [facility];  Service: Colorectal    RI DESTRUCTION,ANAL LESION(S),EXTENSIVE N/A 4/12/2018    Procedure: EXCISION CONDYLOMA ANAL/RECTAL;  Surgeon: Kate Schwab MD;  [facility];  Service: Colorectal    RI DESTRUCTION,ANAL LESION(S),EXTENSIVE N/A 8/30/2018    Procedure: EXCISION / FULGURATION OF LESIONS;  Surgeon: Kate Schwab MD;  [facility];  Service: Colorectal    RI LAP GASTRIC BYPASS/HEIDI-EN-Y N/A 2/17/2020    Procedure: LAPAROSCOPIC HEIDI-EN-Y GASTRIC BYPASS;  Surgeon: Patt Sher MD;  Location: [address] [place] [address];  Service: Bariatrics    RI SURG DIAGNOSTIC EXAM, ANORECTAL N/A 4/12/2018    Procedure: EXAM UNDER ANESTHESIA (EUA); Surgeon: Kate Schwab MD;  [facility];  Service: Colorectal    RI SURG DIAGNOSTIC EXAM, ANORECTAL N/A 8/30/2018    Procedure: EXAM UNDER ANESTHESIA (EUA); Surgeon: Kate Schwab MD;  [facility];  Service: Colorectal    RI SURG DIAGNOSTIC EXAM, ANORECTAL N/A 4/4/2019    Procedure: EXAM UNDER ANESTHESIA (EUA); Surgeon: Kate Schwab MD;  [facility];  Service: Colorectal    RI SURG DIAGNOSTIC EXAM, ANORECTAL N/A 10/4/2019    Procedure: EXAM UNDER ANESTHESIA (EUA); Surgeon: Kate Schwab MD;  [facility];  Service: Colorectal    RI SURG DIAGNOSTIC EXAM, ANORECTAL N/A 10/2/2020    Procedure: EXAM UNDER ANESTHESIA (EUA);   Surgeon: Kate Schwab MD;  [facility];  Service: Colorectal  TONSILLECTOMY      WISDOM TOOTH EXTRACTION      x4       Current Outpatient Medications   Medication Sig Dispense Refill    Calcium 500-100 MG-UNIT CHEW Chew 3  Tabs daily      Coenzyme Q10 (CoQ-10) 100 MG CAPS Take 1 capsule by mouth daily      COLLAGEN PO Take 1 tablet by mouth daily      conjugated estrogens (PREMARIN) vaginal cream Insert 0 5 g into the vagina daily 42 5 g 6    Cyanocobalamin (Vitamin B12) 500 MCG TABS Take 1 tablet by mouth daily      estradiol (ESTRACE) 0 5 MG tablet Take 1 tablet (0 5 mg total) by mouth daily 90 tablet 3    FLUoxetine (PROzac) 40 MG capsule Take 1 capsule (40 mg total) by mouth daily 90 capsule 0    Maya, Zingiber officinalis, (MAYA PO) Take 1 tablet by mouth daily      Multiple Vitamins-Minerals (Bariatric Multivitamins/Iron) CAPS Take 1 capsule by mouth daily      Polyethylene Glycol 3350 (MIRALAX PO) Take by mouth daily      Saw Palmetto 500 MG CAPS Take 1 capsule by mouth daily      tolterodine (DETROL LA) 4 mg 24 hr capsule take 1 capsule by mouth once daily INSTEAD OF MYRBETRIC       No current facility-administered medications for this visit  Allergies   Allergen Reactions    Sulfa Antibiotics Rash       Review of Systems   Constitutional: Negative for chills, fever and unexpected weight change  HENT: Negative for trouble swallowing  Respiratory: Negative for cough and shortness of breath  Cardiovascular: Negative for chest pain and palpitations  Gastrointestinal: Negative for abdominal pain, constipation, diarrhea, nausea and vomiting  Neurological: Negative for dizziness     Psychiatric/Behavioral:        Denies anxiety and depression       Video Exam:  GEN: awake, alert, non-diaphoretic, no psychomotor agitation, no acute distress    HEENT :Head: atraumatic, normocephalic, no rashes noted, no lesions noted    Eyes: NO redness, discharge, swelling, or lesions    Nose: NO redness, swelling, discharge, deformity, or impetigo/crusting    Skin: no lesions, wounds, erythema, or cyanosis noted on face or hands    Cardiopulmonary: no increased respiratory effort, speaking in clear sentences, I:E ratio WNL    Good ambulation in front of the camera    Neuro: speech normal rate and rhythm, orientation arrived to appointment on time with no prompting, moved both upper extremities equally    Pysch: appearance, behavior, and attitude- well groomed, pleasant, cooperative      Vitals:    02/17/21 0945   Weight: 59 kg (130 lb)   Height: 5' 4" (1 626 m)       Physical Exam     I spent 25  minutes with patient today in which greater than 50% of the time was spent in counseling/coordination of care regarding diet, weight maintenance, vitamins, healthy diet changes      VIRTUAL VISIT [address] ([facility]) acknowledges that she has consented to an online visit or consultation  She understands that the online visit is based solely on information provided by her, and that, in the absence of a face-to-face physical evaluation by the physician, the diagnosis she receives is both limited and provisional in terms of accuracy and completeness  This is not intended to replace a full medical face-to-face evaluation by the physician  Jacklyn Niño understands and accepts these terms  show

## (undated) DEVICE — ELECTRODE LAP SPATULA STR E-Z CLEAN 33CM -0018

## (undated) DEVICE — 10 MM BABCOCKS WITH RATCHET HANDLES: Brand: ENDOPATH

## (undated) DEVICE — ELECTRODE NEEDLE MOD E-Z CLEAN 2.75IN 7CM -0013M

## (undated) DEVICE — ALL PURPOSE SPONGES,NONWOVEN, 4 PLY: Brand: CURITY

## (undated) DEVICE — REM POLYHESIVE ADULT PATIENT RETURN ELECTRODE: Brand: VALLEYLAB

## (undated) DEVICE — GLOVE INDICATOR PI UNDERGLOVE SZ 7 BLUE

## (undated) DEVICE — POWER SHELL SIGNIA

## (undated) DEVICE — BETHLEHEM UNIVERSAL MINOR GEN: Brand: CARDINAL HEALTH

## (undated) DEVICE — NEEDLE 25GA X 1 IN SAFETY GLIDE

## (undated) DEVICE — INTENDED FOR TISSUE SEPARATION, AND OTHER PROCEDURES THAT REQUIRE A SHARP SURGICAL BLADE TO PUNCTURE OR CUT.: Brand: BARD-PARKER SAFETY BLADES SIZE 15, STERILE

## (undated) DEVICE — DISPOSABLE BRIEF/UNDERWEAR

## (undated) DEVICE — VIAL DECANTER

## (undated) DEVICE — GLOVE SRG BIOGEL ORTHOPEDIC 8.5

## (undated) DEVICE — GLOVE INDICATOR PI UNDERGLOVE SZ 8.5 BLUE

## (undated) DEVICE — GAUZE SPONGES,16 PLY: Brand: CURITY

## (undated) DEVICE — SUT MONOCRYL 4-0 PS-2 27 IN Y426H

## (undated) DEVICE — LUBRICANT SURGILUBE TUBE 4 OZ  FLIP TOP

## (undated) DEVICE — GROUNDING PAD UNIVERSAL SLW

## (undated) DEVICE — SYRINGE 20ML LL

## (undated) DEVICE — SPONGE STICK WITH PVP-I: Brand: KENDALL

## (undated) DEVICE — TUBING SUCTION 5MM X 12 FT

## (undated) DEVICE — SPECIMEN CONTAINER STERILE PEEL PACK

## (undated) DEVICE — TROCAR: Brand: KII FIOS FIRST ENTRY

## (undated) DEVICE — NEEDLE SPINAL 20G X 3.5 LF

## (undated) DEVICE — GLOVE SRG BIOGEL ECLIPSE 7.5

## (undated) DEVICE — PLUMEPEN PRO 10FT

## (undated) DEVICE — STRL UNIVERSAL MINOR GENERAL: Brand: CARDINAL HEALTH

## (undated) DEVICE — SOLIDIFIER FLUID WASTE CONTROL 1500ML

## (undated) DEVICE — NEEDLE 25G X 1 1/2

## (undated) DEVICE — MEDI-VAC YANK SUCT HNDL W/TPRD BULBOUS TIP: Brand: CARDINAL HEALTH

## (undated) DEVICE — KERLIX BANDAGE ROLL: Brand: KERLIX

## (undated) DEVICE — 1200CC GUARDIAN II: Brand: GUARDIAN

## (undated) DEVICE — LIGHT HANDLE COVER SLEEVE DISP BLUE STELLAR

## (undated) DEVICE — BASIC SINGLE BASIN 2-LF: Brand: MEDLINE INDUSTRIES, INC.

## (undated) DEVICE — TIBURON LAPAROTOMY DRAPE: Brand: CONVERTORS

## (undated) DEVICE — BAG SPECIMEN BIOHAZARD 10 X 10 ADHESIVE

## (undated) DEVICE — SURGICAL GOWN, XL SMARTSLEEVE: Brand: CONVERTORS

## (undated) DEVICE — Device

## (undated) DEVICE — SNARE BARBED 230CM

## (undated) DEVICE — MEDI-VAC YANKAUER SUCTION HANDLE: Brand: CARDINAL HEALTH

## (undated) DEVICE — HARMONIC ACE +7 LAPAROSCOPIC SHEARS ADVANCED HEMOSTASIS 5MM DIAMETER 36CM SHAFT LENGTH  FOR USE WITH GRAY HAND PIECE ONLY: Brand: HARMONIC ACE

## (undated) DEVICE — DRESSING EXUDERM SATIN HYDROCOLLOID 4 X 4 IN

## (undated) DEVICE — PAD GROUNDING ADULT

## (undated) DEVICE — SUT VICRYL 3-0 REEL 54 IN J285G

## (undated) DEVICE — GLOVE SRG BIOGEL 8

## (undated) DEVICE — ADHESIVE SKIN CLSR DERMABOND NX

## (undated) DEVICE — CONMED ACCESSORY ELECTRODE, NEEDLE WITH EXTENDED INSULATION: Brand: CONMED

## (undated) DEVICE — SINGLE-USE BIOPSY FORCEPS: Brand: RADIAL JAW 4

## (undated) DEVICE — TRAP POLY

## (undated) DEVICE — SUT ETHIBOND 2-0 SH/SH 36 IN X523H

## (undated) DEVICE — SPONGE 4 X 4 XRAY 16 PLY STRL LF RFD

## (undated) DEVICE — BRUSH CYTOLOGY 3 MM 240 CM

## (undated) DEVICE — DISPOSABLE BIOPSY VALVE MAJ-1555: Brand: SINGLE USE BIOPSY VALVE (STERILE)

## (undated) DEVICE — 3000CC GUARDIAN II: Brand: GUARDIAN

## (undated) DEVICE — SUT CHROMIC 3-0 SH 27 IN G122H

## (undated) DEVICE — AIRLIFE™  ADULT CUSHION NASAL CANNULA WITH 7 FOOT (2.1 M) CRUSH-RESISTANT OXYGEN TUBING, AND U/CONNECT-IT ADAPTER: Brand: AIRLIFE™

## (undated) DEVICE — STAPLER EEA 25 MM COVIDIEN

## (undated) DEVICE — FORCEP ELECSURG RADIAL JAW4 2.2 X 240CM  HOT BX

## (undated) DEVICE — Device: Brand: OLYMPUS

## (undated) DEVICE — PACK GENERAL LF

## (undated) DEVICE — TUBING BUBBLE CLEAR 5MM X 100 FT NS

## (undated) DEVICE — TISSUE RETRIEVAL SYSTEM: Brand: INZII RETRIEVAL SYSTEM

## (undated) DEVICE — CHLORAPREP HI-LITE 26ML ORANGE

## (undated) DEVICE — SUT PDS II 3-0 SH 27 IN Z316H

## (undated) DEVICE — TRAVELKIT CONTAINS FIRST STEP KIT (200ML EP-4 KIT) AND SOILED SCOPE BAG - 1 KIT: Brand: TRAVELKIT CONTAINS FIRST STEP KIT AND SOILED SCOPE BAG

## (undated) DEVICE — ENDOPATH 5MM CURVED SCISSORS WITH MONOPOLAR CAUTERY: Brand: ENDOPATH

## (undated) DEVICE — STAPLER ENDO GIA ROTICULATOR 60-2.5

## (undated) DEVICE — DRAPE UTILITY

## (undated) DEVICE — INTENDED FOR TISSUE SEPARATION, AND OTHER PROCEDURES THAT REQUIRE A SHARP SURGICAL BLADE TO PUNCTURE OR CUT.: Brand: BARD-PARKER SAFETY BLADES SIZE 11, STERILE

## (undated) DEVICE — VISUALIZATION SYSTEM: Brand: CLEARIFY

## (undated) DEVICE — GLOVE SRG BIOGEL ECLIPSE 6

## (undated) DEVICE — GLOVE SRG BIOGEL ECLIPSE 8.5

## (undated) DEVICE — SUT VICRYL 0 18 IN J906G

## (undated) DEVICE — WEBRIL 6 IN UNSTERILE

## (undated) DEVICE — 60 ML SYRINGE,REGULAR TIP: Brand: MONOJECT

## (undated) DEVICE — GLOVE SRG BIOGEL 7

## (undated) DEVICE — TUBING SMOKE EVAC W/FILTRATION DEVICE PLUMEPORT ACTIV

## (undated) DEVICE — GLOVE INDICATOR UNDERGLOVE SZ 6 BLUE

## (undated) DEVICE — MAYO STAND COVER: Brand: CONVERTORS

## (undated) DEVICE — PMI DISPOSABLE PUNCTURE CLOSURE DEVICE / SUTURE GRASPER: Brand: PMI

## (undated) DEVICE — IRRIG ENDO FLO TUBING

## (undated) DEVICE — [HIGH FLOW INSUFFLATOR,  DO NOT USE IF PACKAGE IS DAMAGED,  KEEP DRY,  KEEP AWAY FROM SUNLIGHT,  PROTECT FROM HEAT AND RADIOACTIVE SOURCES.]: Brand: PNEUMOSURE

## (undated) DEVICE — ASTOUND STANDARD SURGICAL GOWN, XXL: Brand: CONVERTORS

## (undated) DEVICE — SPONGE LAP 18 X 18 IN STRL RFD

## (undated) DEVICE — COVIDIEN ENDO GIA PURPLE (MED) RELOAD 60MM

## (undated) DEVICE — GLOVE EXAM NON-STRL NTRL PLUS LRG PF

## (undated) DEVICE — MARKER SPOT EX  BOWEL TATTOO SYRINGE

## (undated) DEVICE — 3M™ DURAPORE™ SURGICAL TAPE 1538-3, 3 INCH X 10 YARD (7,5CM X 9,1M), 4 ROLLS/BOX: Brand: 3M™ DURAPORE™

## (undated) DEVICE — ENDOPATH XCEL BLADELESS TROCARS WITH STABILITY SLEEVES: Brand: ENDOPATH XCEL

## (undated) DEVICE — TELFA NON-ADHERENT ABSORBENT DRESSING: Brand: TELFA

## (undated) DEVICE — BRUSH ENDO CLEANING DBL-HEADER

## (undated) DEVICE — TUBING AUX CHANNEL